# Patient Record
Sex: FEMALE | URBAN - METROPOLITAN AREA
[De-identification: names, ages, dates, MRNs, and addresses within clinical notes are randomized per-mention and may not be internally consistent; named-entity substitution may affect disease eponyms.]

---

## 2017-08-01 ENCOUNTER — EMERGENCY (EMERGENCY)
Facility: HOSPITAL | Age: 37
LOS: 1 days | Discharge: HOME | End: 2017-08-01
Attending: EMERGENCY MEDICINE | Admitting: EMERGENCY MEDICINE
Payer: MEDICAID

## 2017-08-01 VITALS
SYSTOLIC BLOOD PRESSURE: 100 MMHG | HEART RATE: 77 BPM | OXYGEN SATURATION: 100 % | DIASTOLIC BLOOD PRESSURE: 59 MMHG | TEMPERATURE: 98 F | RESPIRATION RATE: 18 BRPM

## 2017-08-01 VITALS
RESPIRATION RATE: 18 BRPM | SYSTOLIC BLOOD PRESSURE: 97 MMHG | DIASTOLIC BLOOD PRESSURE: 63 MMHG | HEART RATE: 61 BPM | OXYGEN SATURATION: 100 % | TEMPERATURE: 98 F

## 2017-08-01 PROCEDURE — 99284 EMERGENCY DEPT VISIT MOD MDM: CPT

## 2017-08-01 PROCEDURE — 73030 X-RAY EXAM OF SHOULDER: CPT | Mod: 26,RT

## 2017-08-01 NOTE — ED PROVIDER NOTE - PHYSICAL EXAMINATION
ATTENDING PHYSICAL EXAM DR. HUDSON ***GEN - NAD; well appearing; A+O x3 ***HEAD - NC/AT ***EYES/NOSE - PERRL, EOMI, mucous membranes moist, no discharge ***THROAT: Oral cavity and pharynx normal. No inflammation, swelling, exudate, or lesions.  ***NECK: Neck supple, non-tender without lymphadenopathy, no masses, no thyromegaly.   ***PULMONARY - CTA b/l, symmetric breath sounds. ***CARDIAC -s1s2, RRR, no M,G,R  ***ABDOMEN - +BS, ND, NT, soft, no guarding, no rebound, no masses   ***BACK - no CVA tenderness, Normal  spine ***EXTREMITIES - symmetric pulses, 2+ dp, capillary refill < 2 seconds, no clubbing, no cyanosis, no edema, +AC joint tenderness ***SKIN - no rash or bruising   ***NEUROLOGIC - alert, reflexes nl, sensation nl, coordination nl, finger to nose nl, motor 5/5 finger opposition/wrist ext/finger add

## 2017-08-01 NOTE — ED PROVIDER NOTE - OBJECTIVE STATEMENT
38 y/o F w/ no significant PMHx, presents to the ED c/o right shoulder pain x2 weeks. [place] and had a sudden onset of right shoulder pain, worse with movement. Pt notes pain radiates to the neck and to the left shoulder.  Pt has taken Advil, Motrin and Excedrin w/ no relief. Denies fever or any other complaints. NKDA.

## 2017-08-04 ENCOUNTER — EMERGENCY (EMERGENCY)
Facility: HOSPITAL | Age: 37
LOS: 1 days | Discharge: HOME | End: 2017-08-04
Attending: EMERGENCY MEDICINE | Admitting: EMERGENCY MEDICINE
Payer: MEDICAID

## 2017-08-04 VITALS
OXYGEN SATURATION: 100 % | RESPIRATION RATE: 16 BRPM | DIASTOLIC BLOOD PRESSURE: 82 MMHG | TEMPERATURE: 98 F | SYSTOLIC BLOOD PRESSURE: 121 MMHG | HEART RATE: 87 BPM

## 2017-08-04 VITALS
TEMPERATURE: 99 F | OXYGEN SATURATION: 100 % | RESPIRATION RATE: 16 BRPM | DIASTOLIC BLOOD PRESSURE: 78 MMHG | SYSTOLIC BLOOD PRESSURE: 117 MMHG | HEART RATE: 89 BPM

## 2017-08-04 LAB
ALBUMIN SERPL ELPH-MCNC: 4.4 G/DL — SIGNIFICANT CHANGE UP (ref 3.3–5)
ALP SERPL-CCNC: 63 U/L — SIGNIFICANT CHANGE UP (ref 40–120)
ALT FLD-CCNC: 9 U/L — SIGNIFICANT CHANGE UP (ref 4–33)
APPEARANCE UR: SIGNIFICANT CHANGE UP
AST SERPL-CCNC: 13 U/L — SIGNIFICANT CHANGE UP (ref 4–32)
BACTERIA # UR AUTO: SIGNIFICANT CHANGE UP
BASOPHILS # BLD AUTO: 0.03 K/UL — SIGNIFICANT CHANGE UP (ref 0–0.2)
BASOPHILS NFR BLD AUTO: 0.2 % — SIGNIFICANT CHANGE UP (ref 0–2)
BILIRUB SERPL-MCNC: 0.3 MG/DL — SIGNIFICANT CHANGE UP (ref 0.2–1.2)
BILIRUB UR-MCNC: NEGATIVE — SIGNIFICANT CHANGE UP
BLOOD UR QL VISUAL: NEGATIVE — SIGNIFICANT CHANGE UP
BUN SERPL-MCNC: 16 MG/DL — SIGNIFICANT CHANGE UP (ref 7–23)
CALCIUM SERPL-MCNC: 9.4 MG/DL — SIGNIFICANT CHANGE UP (ref 8.4–10.5)
CHLORIDE SERPL-SCNC: 103 MMOL/L — SIGNIFICANT CHANGE UP (ref 98–107)
CO2 SERPL-SCNC: 26 MMOL/L — SIGNIFICANT CHANGE UP (ref 22–31)
COLOR SPEC: SIGNIFICANT CHANGE UP
CREAT SERPL-MCNC: 0.68 MG/DL — SIGNIFICANT CHANGE UP (ref 0.5–1.3)
EOSINOPHIL # BLD AUTO: 0.06 K/UL — SIGNIFICANT CHANGE UP (ref 0–0.5)
EOSINOPHIL NFR BLD AUTO: 0.3 % — SIGNIFICANT CHANGE UP (ref 0–6)
GLUCOSE SERPL-MCNC: 115 MG/DL — HIGH (ref 70–99)
GLUCOSE UR-MCNC: NEGATIVE — SIGNIFICANT CHANGE UP
HCT VFR BLD CALC: 39.5 % — SIGNIFICANT CHANGE UP (ref 34.5–45)
HGB BLD-MCNC: 13.4 G/DL — SIGNIFICANT CHANGE UP (ref 11.5–15.5)
HYALINE CASTS # UR AUTO: SIGNIFICANT CHANGE UP (ref 0–?)
IMM GRANULOCYTES # BLD AUTO: 0.12 # — SIGNIFICANT CHANGE UP
IMM GRANULOCYTES NFR BLD AUTO: 0.6 % — SIGNIFICANT CHANGE UP (ref 0–1.5)
KETONES UR-MCNC: NEGATIVE — SIGNIFICANT CHANGE UP
LEUKOCYTE ESTERASE UR-ACNC: HIGH
LIDOCAIN IGE QN: 32.2 U/L — SIGNIFICANT CHANGE UP (ref 7–60)
LYMPHOCYTES # BLD AUTO: 12.1 % — LOW (ref 13–44)
LYMPHOCYTES # BLD AUTO: 2.29 K/UL — SIGNIFICANT CHANGE UP (ref 1–3.3)
MCHC RBC-ENTMCNC: 31.4 PG — SIGNIFICANT CHANGE UP (ref 27–34)
MCHC RBC-ENTMCNC: 33.9 % — SIGNIFICANT CHANGE UP (ref 32–36)
MCV RBC AUTO: 92.5 FL — SIGNIFICANT CHANGE UP (ref 80–100)
MONOCYTES # BLD AUTO: 0.54 K/UL — SIGNIFICANT CHANGE UP (ref 0–0.9)
MONOCYTES NFR BLD AUTO: 2.9 % — SIGNIFICANT CHANGE UP (ref 2–14)
MUCOUS THREADS # UR AUTO: SIGNIFICANT CHANGE UP
NEUTROPHILS # BLD AUTO: 15.88 K/UL — HIGH (ref 1.8–7.4)
NEUTROPHILS NFR BLD AUTO: 83.9 % — HIGH (ref 43–77)
NITRITE UR-MCNC: NEGATIVE — SIGNIFICANT CHANGE UP
NON-SQ EPI CELLS # UR AUTO: <1 — SIGNIFICANT CHANGE UP
NRBC # FLD: 0 — SIGNIFICANT CHANGE UP
PH UR: 6.5 — SIGNIFICANT CHANGE UP (ref 4.6–8)
PLATELET # BLD AUTO: 281 K/UL — SIGNIFICANT CHANGE UP (ref 150–400)
PMV BLD: 10.4 FL — SIGNIFICANT CHANGE UP (ref 7–13)
POTASSIUM SERPL-MCNC: 4.2 MMOL/L — SIGNIFICANT CHANGE UP (ref 3.5–5.3)
POTASSIUM SERPL-SCNC: 4.2 MMOL/L — SIGNIFICANT CHANGE UP (ref 3.5–5.3)
PROT SERPL-MCNC: 7.8 G/DL — SIGNIFICANT CHANGE UP (ref 6–8.3)
PROT UR-MCNC: 20 — SIGNIFICANT CHANGE UP
RBC # BLD: 4.27 M/UL — SIGNIFICANT CHANGE UP (ref 3.8–5.2)
RBC # FLD: 12.5 % — SIGNIFICANT CHANGE UP (ref 10.3–14.5)
RBC CASTS # UR COMP ASSIST: HIGH (ref 0–?)
SODIUM SERPL-SCNC: 140 MMOL/L — SIGNIFICANT CHANGE UP (ref 135–145)
SP GR SPEC: 1.02 — SIGNIFICANT CHANGE UP (ref 1–1.03)
SQUAMOUS # UR AUTO: SIGNIFICANT CHANGE UP
UROBILINOGEN FLD QL: NORMAL E.U. — SIGNIFICANT CHANGE UP (ref 0.1–0.2)
WBC # BLD: 18.92 K/UL — HIGH (ref 3.8–10.5)
WBC # FLD AUTO: 18.92 K/UL — HIGH (ref 3.8–10.5)
WBC UR QL: SIGNIFICANT CHANGE UP (ref 0–?)

## 2017-08-04 PROCEDURE — 99284 EMERGENCY DEPT VISIT MOD MDM: CPT | Mod: 25

## 2017-08-04 RX ORDER — ONDANSETRON 8 MG/1
4 TABLET, FILM COATED ORAL ONCE
Qty: 0 | Refills: 0 | Status: COMPLETED | OUTPATIENT
Start: 2017-08-04 | End: 2017-08-04

## 2017-08-04 RX ORDER — SODIUM CHLORIDE 9 MG/ML
1000 INJECTION INTRAMUSCULAR; INTRAVENOUS; SUBCUTANEOUS ONCE
Qty: 0 | Refills: 0 | Status: COMPLETED | OUTPATIENT
Start: 2017-08-04 | End: 2017-08-04

## 2017-08-04 RX ORDER — FAMOTIDINE 10 MG/ML
20 INJECTION INTRAVENOUS ONCE
Qty: 0 | Refills: 0 | Status: COMPLETED | OUTPATIENT
Start: 2017-08-04 | End: 2017-08-04

## 2017-08-04 RX ADMIN — Medication 30 MILLILITER(S): at 03:43

## 2017-08-04 RX ADMIN — FAMOTIDINE 20 MILLIGRAM(S): 10 INJECTION INTRAVENOUS at 03:43

## 2017-08-04 RX ADMIN — SODIUM CHLORIDE 1000 MILLILITER(S): 9 INJECTION INTRAMUSCULAR; INTRAVENOUS; SUBCUTANEOUS at 03:43

## 2017-08-04 RX ADMIN — ONDANSETRON 4 MILLIGRAM(S): 8 TABLET, FILM COATED ORAL at 03:43

## 2017-08-04 NOTE — ED PROVIDER NOTE - PHYSICAL EXAMINATION
Abd soft  No guarding, no rebound Abd soft  No guarding, no rebound  ATTENDING PHYSICAL EXAM DR. Freeman ***GEN - appears uncomfortable, recent vomiting; A+O x3 ***HEAD - NC/AT ***EYES/NOSE - PERRL, EOMI, mucous membranes moist, no discharge ***THROAT: Oral cavity and pharynx normal. No inflammation, swelling, exudate, or lesions.  ***NECK: Neck supple, non-tender without lymphadenopathy, no masses, no JVD.   ***PULMONARY - CTA b/l, symmetric breath sounds. ***CARDIAC -s1s2, RRR, no M,R,G  ***ABDOMEN - +BS, ND, NT, soft, no guarding, no rebound, no masses   ***BACK - no CVA tenderness, Normal  spine ***EXTREMITIES - symmetric pulses, 2+ dp, capillary refill < 2 seconds, no clubbing, no cyanosis, no edema ***SKIN - no rash or bruising

## 2017-08-04 NOTE — ED ADULT TRIAGE NOTE - CHIEF COMPLAINT QUOTE
states having lower abdominal pain/cramping for past few hours with n/v. denies diarrhea/constipation. started taking antibiotics today for "some stomach infection." states same happened last time she took these antibiotics. denies any change of pregnancy.

## 2017-08-04 NOTE — ED ADULT NURSE NOTE - OBJECTIVE STATEMENT
Ambulatory accompanied by friend complaining of severe abdominal pain, N/V since earlier this evening. Patient is clearly uncomfortable and unable to sit still in bed. Pain is diffuse, mostly RLQ/LLQ. Denies fever/chills, eating anything out of the ordinary. VSS. UA sent, POCT Preg neg, 20g IV inserted to LAC. Safety maintained, needs attended, will continue to monitor

## 2017-08-04 NOTE — ED PROVIDER NOTE - OBJECTIVE STATEMENT
37F hx of Hpylori presents with abd cramping with nausea and vomiting x a few hours. Pt was prescribed Lansoprazolwe, amox and clarithromycin (AM and PM) dose and Azithromycin (PM). Pt experienced abd pain and cramping after taking Azithromycin. Has 4 episodes of vomiting. LMP unknown, on IUD. 37F hx of Hpylori presents with abd cramping with nausea and vomiting x a few hours. Pt was prescribed Lansoprazolwe, amox and clarithromycin (AM and PM) dose and Azithromycin (PM). Pt experienced abd pain and cramping after taking Azithromycin. Has 4 episodes of vomiting. LMP unknown, on IUD.  Attending - Reviewed above.  I evaluated patient myself.  36 y/o F with boyfriend.  Reports diagnosed with H. Pylori approx 8 months ago and treated.  Advised to repeat treatment per PMD and started the medications yesterday (Thursday).  States after taking the azithromycin this evening developed diffuse abd cramps, nausea, and multiple episodes of vomiting.  No fever.  No chest pain or shortness of breathe.  No recent trauma or illness.

## 2017-08-05 LAB
BACTERIA UR CULT: SIGNIFICANT CHANGE UP
SPECIMEN SOURCE: SIGNIFICANT CHANGE UP

## 2017-08-17 ENCOUNTER — APPOINTMENT (OUTPATIENT)
Dept: OBGYN | Facility: HOSPITAL | Age: 37
End: 2017-08-17

## 2019-06-12 ENCOUNTER — APPOINTMENT (OUTPATIENT)
Dept: DERMATOLOGY | Facility: CLINIC | Age: 39
End: 2019-06-12
Payer: MEDICAID

## 2019-06-12 ENCOUNTER — LABORATORY RESULT (OUTPATIENT)
Age: 39
End: 2019-06-12

## 2019-06-12 VITALS — BODY MASS INDEX: 27.94 KG/M2 | WEIGHT: 162.8 LBS

## 2019-06-12 DIAGNOSIS — B07.8 OTHER VIRAL WARTS: ICD-10-CM

## 2019-06-12 DIAGNOSIS — D48.9 NEOPLASM OF UNCERTAIN BEHAVIOR, UNSPECIFIED: ICD-10-CM

## 2019-06-12 DIAGNOSIS — L21.9 SEBORRHEIC DERMATITIS, UNSPECIFIED: ICD-10-CM

## 2019-06-12 DIAGNOSIS — D18.01 HEMANGIOMA OF SKIN AND SUBCUTANEOUS TISSUE: ICD-10-CM

## 2019-06-12 PROCEDURE — 11102 TANGNTL BX SKIN SINGLE LES: CPT | Mod: 59

## 2019-06-12 PROCEDURE — 99203 OFFICE O/P NEW LOW 30 MIN: CPT | Mod: 25

## 2019-06-12 PROCEDURE — 17110 DESTRUCTION B9 LES UP TO 14: CPT | Mod: 59

## 2019-06-19 ENCOUNTER — TRANSCRIPTION ENCOUNTER (OUTPATIENT)
Age: 39
End: 2019-06-19

## 2019-07-05 ENCOUNTER — TRANSCRIPTION ENCOUNTER (OUTPATIENT)
Age: 39
End: 2019-07-05

## 2019-07-06 ENCOUNTER — RESULT REVIEW (OUTPATIENT)
Age: 39
End: 2019-07-06

## 2019-07-06 ENCOUNTER — INPATIENT (INPATIENT)
Facility: HOSPITAL | Age: 39
LOS: 0 days | Discharge: HOME | End: 2019-07-07
Attending: SURGERY | Admitting: SURGERY
Payer: MEDICAID

## 2019-07-06 VITALS
OXYGEN SATURATION: 98 % | RESPIRATION RATE: 16 BRPM | SYSTOLIC BLOOD PRESSURE: 123 MMHG | HEART RATE: 84 BPM | TEMPERATURE: 98 F | DIASTOLIC BLOOD PRESSURE: 82 MMHG

## 2019-07-06 DIAGNOSIS — K35.80 UNSPECIFIED ACUTE APPENDICITIS: ICD-10-CM

## 2019-07-06 DIAGNOSIS — Z98.890 OTHER SPECIFIED POSTPROCEDURAL STATES: Chronic | ICD-10-CM

## 2019-07-06 LAB
ALBUMIN SERPL ELPH-MCNC: 4.4 G/DL — SIGNIFICANT CHANGE UP (ref 3.3–5)
ALP SERPL-CCNC: 72 U/L — SIGNIFICANT CHANGE UP (ref 40–120)
ALT FLD-CCNC: 13 U/L — SIGNIFICANT CHANGE UP (ref 4–33)
ANION GAP SERPL CALC-SCNC: 14 MMO/L — SIGNIFICANT CHANGE UP (ref 7–14)
APPEARANCE UR: SIGNIFICANT CHANGE UP
APTT BLD: 26.6 SEC — LOW (ref 27.5–36.3)
AST SERPL-CCNC: 14 U/L — SIGNIFICANT CHANGE UP (ref 4–32)
BACTERIA # UR AUTO: SIGNIFICANT CHANGE UP
BASE EXCESS BLDV CALC-SCNC: 3.2 MMOL/L — SIGNIFICANT CHANGE UP
BASOPHILS # BLD AUTO: 0.04 K/UL — SIGNIFICANT CHANGE UP (ref 0–0.2)
BASOPHILS NFR BLD AUTO: 0.2 % — SIGNIFICANT CHANGE UP (ref 0–2)
BILIRUB SERPL-MCNC: 0.5 MG/DL — SIGNIFICANT CHANGE UP (ref 0.2–1.2)
BILIRUB UR-MCNC: NEGATIVE — SIGNIFICANT CHANGE UP
BLD GP AB SCN SERPL QL: NEGATIVE — SIGNIFICANT CHANGE UP
BLOOD GAS VENOUS - CREATININE: 0.75 MG/DL — SIGNIFICANT CHANGE UP (ref 0.5–1.3)
BLOOD GAS VENOUS - FIO2: 21 — SIGNIFICANT CHANGE UP
BLOOD UR QL VISUAL: NEGATIVE — SIGNIFICANT CHANGE UP
BUN SERPL-MCNC: 9 MG/DL — SIGNIFICANT CHANGE UP (ref 7–23)
CALCIUM SERPL-MCNC: 9.4 MG/DL — SIGNIFICANT CHANGE UP (ref 8.4–10.5)
CHLORIDE BLDV-SCNC: 106 MMOL/L — SIGNIFICANT CHANGE UP (ref 96–108)
CHLORIDE SERPL-SCNC: 102 MMOL/L — SIGNIFICANT CHANGE UP (ref 98–107)
CO2 SERPL-SCNC: 22 MMOL/L — SIGNIFICANT CHANGE UP (ref 22–31)
COLOR SPEC: YELLOW — SIGNIFICANT CHANGE UP
CREAT SERPL-MCNC: 0.69 MG/DL — SIGNIFICANT CHANGE UP (ref 0.5–1.3)
EOSINOPHIL # BLD AUTO: 0.15 K/UL — SIGNIFICANT CHANGE UP (ref 0–0.5)
EOSINOPHIL NFR BLD AUTO: 0.6 % — SIGNIFICANT CHANGE UP (ref 0–6)
EPI CELLS # UR: SIGNIFICANT CHANGE UP
GAS PNL BLDV: 137 MMOL/L — SIGNIFICANT CHANGE UP (ref 136–146)
GLUCOSE BLDV-MCNC: 124 MG/DL — HIGH (ref 70–99)
GLUCOSE SERPL-MCNC: 129 MG/DL — HIGH (ref 70–99)
GLUCOSE UR-MCNC: NEGATIVE — SIGNIFICANT CHANGE UP
HCO3 BLDV-SCNC: 25 MMOL/L — SIGNIFICANT CHANGE UP (ref 20–27)
HCT VFR BLD CALC: 39.1 % — SIGNIFICANT CHANGE UP (ref 34.5–45)
HCT VFR BLDV CALC: 41.2 % — SIGNIFICANT CHANGE UP (ref 34.5–45)
HGB BLD-MCNC: 13 G/DL — SIGNIFICANT CHANGE UP (ref 11.5–15.5)
HGB BLDV-MCNC: 13.4 G/DL — SIGNIFICANT CHANGE UP (ref 11.5–15.5)
IMM GRANULOCYTES NFR BLD AUTO: 0.5 % — SIGNIFICANT CHANGE UP (ref 0–1.5)
INR BLD: 1.08 — SIGNIFICANT CHANGE UP (ref 0.88–1.17)
KETONES UR-MCNC: SIGNIFICANT CHANGE UP
LACTATE BLDV-MCNC: 1.3 MMOL/L — SIGNIFICANT CHANGE UP (ref 0.5–2)
LEUKOCYTE ESTERASE UR-ACNC: SIGNIFICANT CHANGE UP
LIDOCAIN IGE QN: 18.2 U/L — SIGNIFICANT CHANGE UP (ref 7–60)
LYMPHOCYTES # BLD AUTO: 1.68 K/UL — SIGNIFICANT CHANGE UP (ref 1–3.3)
LYMPHOCYTES # BLD AUTO: 7.1 % — LOW (ref 13–44)
MCHC RBC-ENTMCNC: 30.3 PG — SIGNIFICANT CHANGE UP (ref 27–34)
MCHC RBC-ENTMCNC: 33.2 % — SIGNIFICANT CHANGE UP (ref 32–36)
MCV RBC AUTO: 91.1 FL — SIGNIFICANT CHANGE UP (ref 80–100)
MONOCYTES # BLD AUTO: 1.27 K/UL — HIGH (ref 0–0.9)
MONOCYTES NFR BLD AUTO: 5.3 % — SIGNIFICANT CHANGE UP (ref 2–14)
NEUTROPHILS # BLD AUTO: 20.53 K/UL — HIGH (ref 1.8–7.4)
NEUTROPHILS NFR BLD AUTO: 86.3 % — HIGH (ref 43–77)
NITRITE UR-MCNC: NEGATIVE — SIGNIFICANT CHANGE UP
NRBC # FLD: 0 K/UL — SIGNIFICANT CHANGE UP (ref 0–0)
PCO2 BLDV: 48 MMHG — SIGNIFICANT CHANGE UP (ref 41–51)
PH BLDV: 7.38 PH — SIGNIFICANT CHANGE UP (ref 7.32–7.43)
PH UR: 8 — SIGNIFICANT CHANGE UP (ref 5–8)
PLATELET # BLD AUTO: 319 K/UL — SIGNIFICANT CHANGE UP (ref 150–400)
PMV BLD: 10.1 FL — SIGNIFICANT CHANGE UP (ref 7–13)
PO2 BLDV: < 24 MMHG — LOW (ref 35–40)
POTASSIUM BLDV-SCNC: 3.9 MMOL/L — SIGNIFICANT CHANGE UP (ref 3.4–4.5)
POTASSIUM SERPL-MCNC: 4.2 MMOL/L — SIGNIFICANT CHANGE UP (ref 3.5–5.3)
POTASSIUM SERPL-SCNC: 4.2 MMOL/L — SIGNIFICANT CHANGE UP (ref 3.5–5.3)
PROT SERPL-MCNC: 7.7 G/DL — SIGNIFICANT CHANGE UP (ref 6–8.3)
PROT UR-MCNC: SIGNIFICANT CHANGE UP
PROTHROM AB SERPL-ACNC: 12.3 SEC — SIGNIFICANT CHANGE UP (ref 9.8–13.1)
RBC # BLD: 4.29 M/UL — SIGNIFICANT CHANGE UP (ref 3.8–5.2)
RBC # FLD: 12.5 % — SIGNIFICANT CHANGE UP (ref 10.3–14.5)
RBC CASTS # UR COMP ASSIST: SIGNIFICANT CHANGE UP (ref 0–?)
RH IG SCN BLD-IMP: POSITIVE — SIGNIFICANT CHANGE UP
SAO2 % BLDV: 31.7 % — LOW (ref 60–85)
SODIUM SERPL-SCNC: 138 MMOL/L — SIGNIFICANT CHANGE UP (ref 135–145)
SP GR SPEC: 1.02 — SIGNIFICANT CHANGE UP (ref 1–1.04)
UROBILINOGEN FLD QL: NORMAL — SIGNIFICANT CHANGE UP
WBC # BLD: 23.79 K/UL — HIGH (ref 3.8–10.5)
WBC # FLD AUTO: 23.79 K/UL — HIGH (ref 3.8–10.5)
WBC UR QL: >50 — HIGH (ref 0–?)

## 2019-07-06 PROCEDURE — 74177 CT ABD & PELVIS W/CONTRAST: CPT | Mod: 26

## 2019-07-06 PROCEDURE — 76830 TRANSVAGINAL US NON-OB: CPT | Mod: 26

## 2019-07-06 PROCEDURE — 88304 TISSUE EXAM BY PATHOLOGIST: CPT | Mod: 26

## 2019-07-06 RX ORDER — ACETAMINOPHEN 500 MG
650 TABLET ORAL EVERY 6 HOURS
Refills: 0 | Status: DISCONTINUED | OUTPATIENT
Start: 2019-07-07 | End: 2019-07-07

## 2019-07-06 RX ORDER — OXYCODONE HYDROCHLORIDE 5 MG/1
5 TABLET ORAL EVERY 4 HOURS
Refills: 0 | Status: DISCONTINUED | OUTPATIENT
Start: 2019-07-06 | End: 2019-07-06

## 2019-07-06 RX ORDER — HYDROMORPHONE HYDROCHLORIDE 2 MG/ML
1 INJECTION INTRAMUSCULAR; INTRAVENOUS; SUBCUTANEOUS ONCE
Refills: 0 | Status: DISCONTINUED | OUTPATIENT
Start: 2019-07-06 | End: 2019-07-06

## 2019-07-06 RX ORDER — ACETAMINOPHEN 500 MG
650 TABLET ORAL ONCE
Refills: 0 | Status: COMPLETED | OUTPATIENT
Start: 2019-07-06 | End: 2019-07-06

## 2019-07-06 RX ORDER — NITROFURANTOIN MACROCRYSTAL 50 MG
100 CAPSULE ORAL
Refills: 0 | Status: DISCONTINUED | OUTPATIENT
Start: 2019-07-06 | End: 2019-07-07

## 2019-07-06 RX ORDER — CEFOTETAN DISODIUM 1 G
2 VIAL (EA) INJECTION EVERY 12 HOURS
Refills: 0 | Status: DISCONTINUED | OUTPATIENT
Start: 2019-07-06 | End: 2019-07-06

## 2019-07-06 RX ORDER — SODIUM CHLORIDE 9 MG/ML
1000 INJECTION INTRAMUSCULAR; INTRAVENOUS; SUBCUTANEOUS ONCE
Refills: 0 | Status: COMPLETED | OUTPATIENT
Start: 2019-07-06 | End: 2019-07-06

## 2019-07-06 RX ORDER — IBUPROFEN 200 MG
400 TABLET ORAL EVERY 6 HOURS
Refills: 0 | Status: DISCONTINUED | OUTPATIENT
Start: 2019-07-07 | End: 2019-07-07

## 2019-07-06 RX ORDER — HYDROMORPHONE HYDROCHLORIDE 2 MG/ML
0.5 INJECTION INTRAMUSCULAR; INTRAVENOUS; SUBCUTANEOUS
Refills: 0 | Status: DISCONTINUED | OUTPATIENT
Start: 2019-07-06 | End: 2019-07-06

## 2019-07-06 RX ORDER — METRONIDAZOLE 500 MG
500 TABLET ORAL ONCE
Refills: 0 | Status: COMPLETED | OUTPATIENT
Start: 2019-07-06 | End: 2019-07-06

## 2019-07-06 RX ORDER — ONDANSETRON 8 MG/1
4 TABLET, FILM COATED ORAL ONCE
Refills: 0 | Status: DISCONTINUED | OUTPATIENT
Start: 2019-07-06 | End: 2019-07-06

## 2019-07-06 RX ORDER — MORPHINE SULFATE 50 MG/1
4 CAPSULE, EXTENDED RELEASE ORAL ONCE
Refills: 0 | Status: DISCONTINUED | OUTPATIENT
Start: 2019-07-06 | End: 2019-07-06

## 2019-07-06 RX ORDER — CEFTRIAXONE 500 MG/1
1000 INJECTION, POWDER, FOR SOLUTION INTRAMUSCULAR; INTRAVENOUS ONCE
Refills: 0 | Status: COMPLETED | OUTPATIENT
Start: 2019-07-06 | End: 2019-07-06

## 2019-07-06 RX ORDER — KETOROLAC TROMETHAMINE 30 MG/ML
15 SYRINGE (ML) INJECTION ONCE
Refills: 0 | Status: DISCONTINUED | OUTPATIENT
Start: 2019-07-06 | End: 2019-07-06

## 2019-07-06 RX ORDER — ACETAMINOPHEN 500 MG
975 TABLET ORAL EVERY 6 HOURS
Refills: 0 | Status: DISCONTINUED | OUTPATIENT
Start: 2019-07-06 | End: 2019-07-06

## 2019-07-06 RX ORDER — MORPHINE SULFATE 50 MG/1
2 CAPSULE, EXTENDED RELEASE ORAL EVERY 6 HOURS
Refills: 0 | Status: DISCONTINUED | OUTPATIENT
Start: 2019-07-06 | End: 2019-07-06

## 2019-07-06 RX ORDER — ACETAMINOPHEN 500 MG
1000 TABLET ORAL ONCE
Refills: 0 | Status: COMPLETED | OUTPATIENT
Start: 2019-07-06 | End: 2019-07-06

## 2019-07-06 RX ORDER — ENOXAPARIN SODIUM 100 MG/ML
40 INJECTION SUBCUTANEOUS EVERY 24 HOURS
Refills: 0 | Status: DISCONTINUED | OUTPATIENT
Start: 2019-07-06 | End: 2019-07-07

## 2019-07-06 RX ORDER — SODIUM CHLORIDE 9 MG/ML
1000 INJECTION, SOLUTION INTRAVENOUS
Refills: 0 | Status: DISCONTINUED | OUTPATIENT
Start: 2019-07-06 | End: 2019-07-06

## 2019-07-06 RX ORDER — OXYCODONE HYDROCHLORIDE 5 MG/1
10 TABLET ORAL EVERY 4 HOURS
Refills: 0 | Status: DISCONTINUED | OUTPATIENT
Start: 2019-07-06 | End: 2019-07-07

## 2019-07-06 RX ORDER — HYDROMORPHONE HYDROCHLORIDE 2 MG/ML
1 INJECTION INTRAMUSCULAR; INTRAVENOUS; SUBCUTANEOUS
Refills: 0 | Status: DISCONTINUED | OUTPATIENT
Start: 2019-07-06 | End: 2019-07-06

## 2019-07-06 RX ORDER — OXYCODONE HYDROCHLORIDE 5 MG/1
5 TABLET ORAL EVERY 4 HOURS
Refills: 0 | Status: DISCONTINUED | OUTPATIENT
Start: 2019-07-06 | End: 2019-07-07

## 2019-07-06 RX ADMIN — Medication 100 MILLIGRAM(S): at 08:42

## 2019-07-06 RX ADMIN — OXYCODONE HYDROCHLORIDE 5 MILLIGRAM(S): 5 TABLET ORAL at 11:33

## 2019-07-06 RX ADMIN — SODIUM CHLORIDE 1000 MILLILITER(S): 9 INJECTION INTRAMUSCULAR; INTRAVENOUS; SUBCUTANEOUS at 03:39

## 2019-07-06 RX ADMIN — HYDROMORPHONE HYDROCHLORIDE 0.5 MILLIGRAM(S): 2 INJECTION INTRAMUSCULAR; INTRAVENOUS; SUBCUTANEOUS at 21:30

## 2019-07-06 RX ADMIN — MORPHINE SULFATE 4 MILLIGRAM(S): 50 CAPSULE, EXTENDED RELEASE ORAL at 03:55

## 2019-07-06 RX ADMIN — MORPHINE SULFATE 4 MILLIGRAM(S): 50 CAPSULE, EXTENDED RELEASE ORAL at 05:03

## 2019-07-06 RX ADMIN — OXYCODONE HYDROCHLORIDE 5 MILLIGRAM(S): 5 TABLET ORAL at 10:07

## 2019-07-06 RX ADMIN — SODIUM CHLORIDE 100 MILLILITER(S): 9 INJECTION, SOLUTION INTRAVENOUS at 10:33

## 2019-07-06 RX ADMIN — SODIUM CHLORIDE 1000 MILLILITER(S): 9 INJECTION INTRAMUSCULAR; INTRAVENOUS; SUBCUTANEOUS at 04:44

## 2019-07-06 RX ADMIN — HYDROMORPHONE HYDROCHLORIDE 1 MILLIGRAM(S): 2 INJECTION INTRAMUSCULAR; INTRAVENOUS; SUBCUTANEOUS at 06:42

## 2019-07-06 RX ADMIN — CEFTRIAXONE 100 MILLIGRAM(S): 500 INJECTION, POWDER, FOR SOLUTION INTRAMUSCULAR; INTRAVENOUS at 06:02

## 2019-07-06 RX ADMIN — HYDROMORPHONE HYDROCHLORIDE 0.5 MILLIGRAM(S): 2 INJECTION INTRAMUSCULAR; INTRAVENOUS; SUBCUTANEOUS at 21:15

## 2019-07-06 RX ADMIN — Medication 650 MILLIGRAM(S): at 07:46

## 2019-07-06 RX ADMIN — MORPHINE SULFATE 4 MILLIGRAM(S): 50 CAPSULE, EXTENDED RELEASE ORAL at 03:41

## 2019-07-06 RX ADMIN — HYDROMORPHONE HYDROCHLORIDE 1 MILLIGRAM(S): 2 INJECTION INTRAMUSCULAR; INTRAVENOUS; SUBCUTANEOUS at 07:00

## 2019-07-06 RX ADMIN — Medication 650 MILLIGRAM(S): at 07:06

## 2019-07-06 RX ADMIN — MORPHINE SULFATE 2 MILLIGRAM(S): 50 CAPSULE, EXTENDED RELEASE ORAL at 15:06

## 2019-07-06 RX ADMIN — Medication 15 MILLIGRAM(S): at 03:55

## 2019-07-06 RX ADMIN — MORPHINE SULFATE 4 MILLIGRAM(S): 50 CAPSULE, EXTENDED RELEASE ORAL at 08:42

## 2019-07-06 RX ADMIN — MORPHINE SULFATE 4 MILLIGRAM(S): 50 CAPSULE, EXTENDED RELEASE ORAL at 11:33

## 2019-07-06 RX ADMIN — MORPHINE SULFATE 4 MILLIGRAM(S): 50 CAPSULE, EXTENDED RELEASE ORAL at 04:45

## 2019-07-06 RX ADMIN — Medication 400 MILLIGRAM(S): at 16:18

## 2019-07-06 RX ADMIN — SODIUM CHLORIDE 1000 MILLILITER(S): 9 INJECTION INTRAMUSCULAR; INTRAVENOUS; SUBCUTANEOUS at 16:31

## 2019-07-06 RX ADMIN — Medication 15 MILLIGRAM(S): at 03:40

## 2019-07-06 NOTE — ED PROVIDER NOTE - CLINICAL SUMMARY MEDICAL DECISION MAKING FREE TEXT BOX
appy vs uti vs pyelo vs ovarian torsion vs cholecysitis vs pancreatitis vs ectopic. labs fluids, urine preg, ct, transvagus abx reassess

## 2019-07-06 NOTE — H&P ADULT - NSHPPHYSICALEXAM_GEN_ALL_CORE
Vital Signs Last 24 Hrs  T(C): 37.1 (06 Jul 2019 07:47), Max: 37.9 (06 Jul 2019 06:43)  T(F): 98.7 (06 Jul 2019 07:47), Max: 100.3 (06 Jul 2019 06:43)  HR: 98 (06 Jul 2019 06:43) (84 - 98)  BP: 118/64 (06 Jul 2019 06:43) (114/71 - 123/82)  BP(mean): --  RR: 17 (06 Jul 2019 06:43) (16 - 18)  SpO2: 100% (06 Jul 2019 06:43) (98% - 100%)    GEN: NAD, resting quietly  PULM: symmetric chest rise bilaterally, no increased WOB  CV: regular rate, peripheral pulses intact  ABD: soft, non-distended, TTP RLQ, voluntary guarding, negative Rovsings sign  EXTR: no cyanosis or edema, moving all extremities

## 2019-07-06 NOTE — BRIEF OPERATIVE NOTE - NSICDXBRIEFPOSTOP_GEN_ALL_CORE_FT
POST-OP DIAGNOSIS:  Umbilical hernia 06-Jul-2019 21:21:48  Jamey Melendez  Acute appendicitis 06-Jul-2019 21:19:18  Jamey Melendez

## 2019-07-06 NOTE — BRIEF OPERATIVE NOTE - NSICDXBRIEFPREOP_GEN_ALL_CORE_FT
PRE-OP DIAGNOSIS:  Umbilical hernia 06-Jul-2019 21:21:39  Jamey Melendez  Acute appendicitis 06-Jul-2019 21:19:09  Jamey Melendez

## 2019-07-06 NOTE — H&P ADULT - HISTORY OF PRESENT ILLNESS
40 yo woman with no PMH presenting with acute onset abdominal pain x1 day. Pain was initially diffusely periumbilical, then migrated to the RLQ and suprapubic regions, with associated nausea, vomiting, fevers, and chills. She also reports urinary frequency during the same period but denies dysuria. Denies diarrhea or constipation. Last PO intake yesterday at 10 am.    ED Course: afebrile, stable vital signs. Received 2L NS and ceftriaxone/flagyl.

## 2019-07-06 NOTE — ED PROVIDER NOTE - ATTENDING CONTRIBUTION TO CARE
concern for appy vs gb disease vs pyelo vs uti vs torsion. will check labs pain control, abx, urine culture ct abdomen

## 2019-07-06 NOTE — ED ADULT NURSE NOTE - CHIEF COMPLAINT QUOTE
pt c/o epigastric pain radiating from the top of the abdomen down to her pelvis. pt endorses nausea and vomiting. symptoms began this morning. LMP- 6/14. triage EKG in progress. pt appears uncomfortable in triage, brought to ambulance bay to lay down on stretcher.

## 2019-07-06 NOTE — H&P ADULT - ASSESSMENT
40 yo otherwise healthy woman presenting with acute appendicitis and possible UTI.    - admit to A Team, Dr. Jose Castro  - added on for the OR and consented  - Pain control  - NPO, IVF  - IV abx: cefotetan (already received ceftriaxone and flagyl)  - dvt ppx: lovenox    Patient discussed with Dr. Matthew SCHMID Team Surgery  [id number] 40 yo otherwise healthy woman presenting with acute appendicitis and possible UTI.    - admit to A Team, Dr. Jose Castro  - added on for the OR and consented  - T&S, coags pending  - Pain control  - NPO, IVF  - IV abx: cefotetan (already received ceftriaxone and flagyl)  - dvt ppx: lovenox    Patient discussed with Dr. Matthew SCHMID Team Surgery  [id number] 40 yo otherwise healthy woman presenting with acute appendicitis and possible UTI.    - admit to A Team, Dr. Jose Castro  - added on for the OR and consented  - T&S, coags pending  - Pain control  - NPO, IVF  - IV abx: cefotetan (already received ceftriaxone and flagyl)  - dvt ppx: SCDs    Patient discussed with Dr. Matthew SCHMID Team Surgery  [id number]

## 2019-07-06 NOTE — BRIEF OPERATIVE NOTE - OPERATION/FINDINGS
Infra-umbilical curvilinear incision made. Small, fat-containing umbilical hernia identified. Sac dissected free and resected. Access gained to abdominal cavity utilizing Irby technique. Appendix found to be markedly inflamed and distended, and adherent to the terminal ileum. Blunt dissection was used to separate the appendix from nearby structures. The mesoappendix was divided with endo-Ligasure. The base of the appendix was divided with an endo-QUENTIN purple load. The staple line was hemostatic. A minimal amount of turbid fluid was suctioned from the pelvis. The umbilical hernia defect was closed primarily with interrupted Maxxon sutures.

## 2019-07-06 NOTE — ED PROVIDER NOTE - OBJECTIVE STATEMENT
39 year old female no pmh with 1 day n/v and right sided abd pain. + fever no sob no cp no headache no dysuria no vaginal discharge. symptoms worsening throughout day. pain is sharp and sever. constant. No

## 2019-07-06 NOTE — ED ADULT TRIAGE NOTE - CHIEF COMPLAINT QUOTE
pt c/o epigastric pain radiating from the top of the abdomen down to her pelvis. pt endorses nausea and vomiting. symptoms began this morning. LMP- 6/14. triage EKG in progress. pt c/o epigastric pain radiating from the top of the abdomen down to her pelvis. pt endorses nausea and vomiting. symptoms began this morning. LMP- 6/14. triage EKG in progress. pt appears uncomfortable in triage, brought to ambulance bay to lay down on stretcher.

## 2019-07-06 NOTE — H&P ADULT - NSHPLABSRESULTS_GEN_ALL_CORE
CBC (07-06 @ 03:19)                              13.0                           23.79<H>  )----------------(  319        86.3<H>% Neutrophils, 7.1<L>% Lymphocytes, ANC: 20.53<H>                              39.1                  BMP (07-06 @ 03:19)             138     |  102     |  9     		Ca++ --      Ca 9.4                ---------------------------------( 129<H>		Mg --                 4.2     |  22      |  0.69  			Ph --        LFTs (07-06 @ 03:19)      TPro 7.7 / Alb 4.4 / TBili 0.5 / DBili -- / AST 14 / ALT 13 / AlkPhos 72      ABG (07-06 @ 03:20)      /  /  /  /  / %     Lactate:   1.3    VBG (07-06 @ 03:20)     7.38 / 48 / < 24<L> / 25 / 3.2 / 31.7<L>%      IMAGING:  < from: CT Abdomen and Pelvis w/ IV Cont (07.06.19 @ 06:51) >    IMPRESSION:     Acute appendicitis. No bowelobstruction, drainable fluid collection or   intraperitoneal free air.    Thickened bladder wall with mucosal enhancement. Recommend clinical   correlation to assess urinary tract infection.    < end of copied text >

## 2019-07-06 NOTE — ED ADULT NURSE REASSESSMENT NOTE - NS ED NURSE REASSESS COMMENT FT1
receiving pt from night RN. Pt aox3, ambulatory. pt reports having urinary symptoms x 4 days. pt reports clitoris burns upon urination and pt experiencing frequent urge to urinate. pt reports increasing LRQ pain that started early this morning. pt appears uncomfortable. pt medicated as ordered. pt to be consulted by surgery team. pt denies headache, chest pain, sob. 20g iv access to right AC intact and flushing well.

## 2019-07-06 NOTE — PROVIDER CONTACT NOTE (OTHER) - ACTION/TREATMENT ORDERED:
Pt shakiness, VS-137/100, HR-110, Temp-102.2 F, RR-38, and SaO2-100%. Complaint of 10/10 pain in RLQ. Nathaly Gaytan MD made aware. Will continue to monitor.

## 2019-07-06 NOTE — H&P ADULT - ATTENDING COMMENTS
Patient seen and examined. She has had pain since earlier this morning. Denies any fever or chills. Never had pain like this before.    On exam: she is awake, alert and oriented  She is breathing comfortably on room air  There is no scleral icterus  She is grossly moving all extremities  Her abdomen is soft, tender in the right lower quadrant and suprapubic area. There is a reducible umbilical hernia.     Labs and imaging reviewed.    39 year old woman with acute appendicitis  1. Antibiotics  2. NPO and IV fluids  3. OR for appendectomy  - Risks, benefits, and alternatives discussed. All her questions, and family's questions answered.

## 2019-07-06 NOTE — PATIENT PROFILE ADULT - NSPROPASSIVESMOKEEXPOSURE_GEN_A_NUR
Reason For Visit    MARILYN PARRA presents for an INR check.          Referred By   PCP Dr Betancourt    Dx: Cerebral Infarct, unspecified (prior to 2015- as per initial PCP note)  Factor V deficiency.      History of Present Illness    Symptoms:.   The patient is currently asymptomatic.   Additional Screening:.   No. Missed dose(s).    No: Extra dose(s).   No: Significant medication changes since last visit .   No: Vitamin K dietary changes.    No: S/Sx(s) of bleeding or bruising.    No: ETOH Intake.    No: Falls/Injuries.    No: Acute Illness.    No: Procedure/Hospital/ER Visit.      Current Meds   1. Allegra-D Allergy & Congestion 180-240 MG Oral Tablet Extended Release 24 Hour; TAKE   1 TABLET DAILY;   Therapy: 21Myu0651 to (Evaluate:14Mar2016); Last Rx:[id number] Ordered   2. Amoxicillin-Pot Clavulanate 875-125 MG Oral Tablet; TAKE ONE TABLET BY MOUTH   EVERY 12 HOURS UNTIL ALL TAKEN;   Therapy: 18Hnc3724 to (Evaluate:57Qpe5621)  Requested for: 51Srf4219; Last   Rx:[id number] Ordered   3. Esomeprazole Magnesium 40 MG Oral Capsule Delayed Release; TAKE ONE CAPSULE   BY MOUTH DAILY AS NEEDED;   Therapy: 01Jun2015 to (Evaluate:47Mpf9839)  Requested for: 19Nov2016; Last   Rx:[id number] Ordered   4. Fluticasone Propionate 50 MCG/ACT Nasal Suspension; USE 1 SPRAY IN EACH   NOSTRIL TWICE DAILY;   Therapy: 09Mpj7044 to (Last Rx:[id number])  Requested for: 01Mar2016 Ordered   5. FreeStyle Lite Test In Vitro Strip; TEST 3 TIMES DAILY;   Therapy: 17Oct2015 to (Evaluate:01Mar2016)  Requested for: 17Oct2015; Last   Rx:[id number] Ordered   6. Lidocaine 5 % External Ointment;   Therapy: 65Wct9561 to Recorded   7. Lisinopril-Hydrochlorothiazide 20-12.5 MG Oral Tablet; TAKE 1 TABLET BY MOUTH DAILY;   Therapy: 25Oct2016 to (Evaluate:23Jan2018)  Requested for: 28Jan2017; Last   Rx:[id number] Ordered   8. Lyrica 200 MG Oral Capsule; TAKE ONE CAPSULE BY MOUTH THREE TIMES DAILY;   Therapy: 80Jmw6955 to (Evaluate:61Mdx8788)  Requested for:  73Iqu4871; Last   Rx:[id number] Ordered   9. Meclizine HCl - 25 MG Oral Tablet; TAKE 1 TABLET 3 TIMES DAILY;   Therapy: 05Nov2015 to (Evaluate:04Jan2016)  Requested for: 05Nov2015; Last   Rx:[id number] Ordered   10. Meloxicam 15 MG Oral Tablet; TAKE 1 TABLET BY MOUTH DAILY WITH FOOD;    Therapy: 03May2016 to (Evaluate:38Rle3357)  Requested for: 88Ffk1733; Last    Rx:[id number] Ordered   11. Metoprolol Succinate ER 50 MG Oral Tablet Extended Release 24 Hour; TAKE 1 TABLET    DAILY;    Therapy: (Recorded:96Ufs5420) to Recorded   12. ProAir  (90 Base) MCG/ACT Inhalation Aerosol Solution; INHALE 2 PUFFS BY    MOUTH EVERY 6 HOURS AS NEEDED;    Therapy: 01Feb2015 to (Evaluate:56Lud1502)  Requested for: 05Apr2016; Last    Rx:[id number] Ordered   13. Restasis 0.05 % Ophthalmic Emulsion;    Therapy: (Recorded:21Oct2016) to Recorded   14. Simvastatin 10 MG Oral Tablet; TAKE 1 TABLET BY MOUTH EVERY DAY;    Therapy: 05Apr2016 to (Evaluate:29Mar2017)  Requested for: 48Dyq8809; Last    Rx:[id number] Ordered   15. Symbicort 160-4.5 MCG/ACT Inhalation Aerosol; INHALE 2 PUFFS BY MOUTH TWICE    DAILY. RINSE MOUTH AFTER USE;    Therapy: 02Feb2015 to (Evaluate:82Koi7415)  Requested for: 28Jan2017; Last    Rx:[id number] Ordered   16. Tamsulosin HCl - 0.4 MG Oral Capsule; TAKE ONE CAPSULE BY MOUTH DAILY;    Therapy: 14Jan2016 to (Evaluate:29Mar2017)  Requested for: 15Zfr4557; Last    Rx:[id number] Ordered   17. Travatan Z 0.004 % Ophthalmic Solution;    Therapy: (Recorded:53Qwy4692) to Recorded   18. Warfarin Sodium 4 MG Oral Tablet; TAKE 1 TABLET BY MOUTH DAILY AS DIRECTED;    Therapy: 05Jan2017 to (Evaluate:43Bkr3095)  Requested for: 41Hdp8899; Last    Rx:[id number] Ordered    Results/Data  Coumadin New    [id number] 31Jan2017 17Jan2017 03Jan2017 [id number]   PROTHROMBIN TIME, FINGERSTICK    14.6  31.6    INR, FINGERSTICK 3.9  3.5  3.3  1.2  2.6    Date Current dose INR Plan  2/14/17 6 mg T,Th,Sat; 8 mg ROW (50 mg/wk) 3.9 hold x 1 (T), 6  mg x 1 (W) then DECR 8 mg MWF, 6 mg ROW, etiology: few alcoholic drinks during the weekend  1/31/17 6 mg T, 8 mg ROW (54 mg/wk) 3.5 hold x 1 (T) then DECR 6 mg T,Th,Sat; 8 mg ROW (50 mg/wk), etiology: dose too high??  1/17/17 8 mg daily (56 mg/wk) as per pt's report 3.3 DECR: 6 mg T, 8 mg ROW (54 mg/wk)  1/03/17 8 mg TF, 6 mg ROW (46 mg/wk) 1.2 12 mg today then CPM 8 mg TF, 6 mg ROW (46 mg/wk), etiology: missed dose/doses.         03 Jan 2017 12:20 PM    prc PROTHROMBIN TIME, IN-OFFICE FINGERSTICK        INR, FINGERSTICK 1.2        PROTHROMBIN TIME, FINGERSTICK 14.6         Assessment   1. Cerebral infarction, unspecified (434.91) (I63.9)   2. 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   Goal INR Range: 2.0-3.0.   Estimated Duration: LONG-TERM. Pt reported that on the Jan 5-6 he got phone call from Dr Betancourt's office with instructions to increase Coumadin to 8 mg daily and since then he takes 8 mg daily as ordered.      Orders   · prc PROTHROMBIN TIME, IN-OFFICE FINGERSTICK; Status:Complete;   Done:  88Wjf5735 10:00AM   Performed:In Office; Due:16Mar2017; Last Updated By:Shonda Elizabeth; 2/14/2017 12:48:18 PM;Ordered; For:Cerebral infarction, unspecified, Factor V deficiency; Ordered By:RASTA BETANCOURT;      Additional Dosing Instructions: hold x 1 (T), 6 mg x 1 (W) then DECR 8 mg MWF, 6 mg ROW.   Follow-up: 2 weeks 2/28/2017.    Provided patient with verbal and written dosing instructions. Pt verbalized understanding.      Counseling    Strongly advised to limit or avoid alcohol consumption; also discussed risks of INR elevation/bleeding with excessive alcohol consumption:   Discussed the risk of thrombotic and bleeding complications with inappropriate use of anticoagulant:   Patient agrees to all of the above       Signatures   Electronically signed by : Shonda Elizabeth, ; Feb 14 2017 12:52PM CST     No

## 2019-07-07 ENCOUNTER — TRANSCRIPTION ENCOUNTER (OUTPATIENT)
Age: 39
End: 2019-07-07

## 2019-07-07 VITALS
OXYGEN SATURATION: 99 % | TEMPERATURE: 99 F | DIASTOLIC BLOOD PRESSURE: 73 MMHG | SYSTOLIC BLOOD PRESSURE: 116 MMHG | RESPIRATION RATE: 17 BRPM | HEART RATE: 90 BPM

## 2019-07-07 LAB
ANION GAP SERPL CALC-SCNC: 12 MMO/L — SIGNIFICANT CHANGE UP (ref 7–14)
BACTERIA UR CULT: SIGNIFICANT CHANGE UP
BASOPHILS # BLD AUTO: 0.01 K/UL — SIGNIFICANT CHANGE UP (ref 0–0.2)
BASOPHILS NFR BLD AUTO: 0 % — SIGNIFICANT CHANGE UP (ref 0–2)
BASOPHILS NFR SPEC: 0 % — SIGNIFICANT CHANGE UP (ref 0–2)
BLASTS # FLD: 0 % — SIGNIFICANT CHANGE UP (ref 0–0)
BUN SERPL-MCNC: 6 MG/DL — LOW (ref 7–23)
CALCIUM SERPL-MCNC: 8.4 MG/DL — SIGNIFICANT CHANGE UP (ref 8.4–10.5)
CHLORIDE SERPL-SCNC: 103 MMOL/L — SIGNIFICANT CHANGE UP (ref 98–107)
CO2 SERPL-SCNC: 23 MMOL/L — SIGNIFICANT CHANGE UP (ref 22–31)
CREAT SERPL-MCNC: 0.56 MG/DL — SIGNIFICANT CHANGE UP (ref 0.5–1.3)
EOSINOPHIL # BLD AUTO: 0.07 K/UL — SIGNIFICANT CHANGE UP (ref 0–0.5)
EOSINOPHIL NFR BLD AUTO: 0.3 % — SIGNIFICANT CHANGE UP (ref 0–6)
EOSINOPHIL NFR FLD: 0 % — SIGNIFICANT CHANGE UP (ref 0–6)
GIANT PLATELETS BLD QL SMEAR: PRESENT — SIGNIFICANT CHANGE UP
GLUCOSE SERPL-MCNC: 140 MG/DL — HIGH (ref 70–99)
HCT VFR BLD CALC: 32.2 % — LOW (ref 34.5–45)
HGB BLD-MCNC: 10.7 G/DL — LOW (ref 11.5–15.5)
IMM GRANULOCYTES NFR BLD AUTO: 0.7 % — SIGNIFICANT CHANGE UP (ref 0–1.5)
LYMPHOCYTES # BLD AUTO: 1.22 K/UL — SIGNIFICANT CHANGE UP (ref 1–3.3)
LYMPHOCYTES # BLD AUTO: 5.9 % — LOW (ref 13–44)
LYMPHOCYTES NFR SPEC AUTO: 5.2 % — LOW (ref 13–44)
MAGNESIUM SERPL-MCNC: 1.9 MG/DL — SIGNIFICANT CHANGE UP (ref 1.6–2.6)
MCHC RBC-ENTMCNC: 30.7 PG — SIGNIFICANT CHANGE UP (ref 27–34)
MCHC RBC-ENTMCNC: 33.2 % — SIGNIFICANT CHANGE UP (ref 32–36)
MCV RBC AUTO: 92.3 FL — SIGNIFICANT CHANGE UP (ref 80–100)
METAMYELOCYTES # FLD: 0 % — SIGNIFICANT CHANGE UP (ref 0–1)
MONOCYTES # BLD AUTO: 0.31 K/UL — SIGNIFICANT CHANGE UP (ref 0–0.9)
MONOCYTES NFR BLD AUTO: 1.5 % — LOW (ref 2–14)
MONOCYTES NFR BLD: 1.8 % — LOW (ref 2–9)
MYELOCYTES NFR BLD: 0 % — SIGNIFICANT CHANGE UP (ref 0–0)
NEUTROPHIL AB SER-ACNC: 91.3 % — HIGH (ref 43–77)
NEUTROPHILS # BLD AUTO: 19.03 K/UL — HIGH (ref 1.8–7.4)
NEUTROPHILS NFR BLD AUTO: 91.6 % — HIGH (ref 43–77)
NEUTS BAND # BLD: 0 % — SIGNIFICANT CHANGE UP (ref 0–6)
NRBC # FLD: 0 K/UL — SIGNIFICANT CHANGE UP (ref 0–0)
OTHER - HEMATOLOGY %: 0 — SIGNIFICANT CHANGE UP
PHOSPHATE SERPL-MCNC: 2.1 MG/DL — LOW (ref 2.5–4.5)
PLATELET # BLD AUTO: 258 K/UL — SIGNIFICANT CHANGE UP (ref 150–400)
PLATELET COUNT - ESTIMATE: NORMAL — SIGNIFICANT CHANGE UP
PMV BLD: 10.6 FL — SIGNIFICANT CHANGE UP (ref 7–13)
POTASSIUM SERPL-MCNC: 3.6 MMOL/L — SIGNIFICANT CHANGE UP (ref 3.5–5.3)
POTASSIUM SERPL-SCNC: 3.6 MMOL/L — SIGNIFICANT CHANGE UP (ref 3.5–5.3)
PROMYELOCYTES # FLD: 0 % — SIGNIFICANT CHANGE UP (ref 0–0)
RBC # BLD: 3.49 M/UL — LOW (ref 3.8–5.2)
RBC # FLD: 12.8 % — SIGNIFICANT CHANGE UP (ref 10.3–14.5)
SODIUM SERPL-SCNC: 138 MMOL/L — SIGNIFICANT CHANGE UP (ref 135–145)
SPECIMEN SOURCE: SIGNIFICANT CHANGE UP
VARIANT LYMPHS # BLD: 1.7 % — SIGNIFICANT CHANGE UP
WBC # BLD: 20.78 K/UL — HIGH (ref 3.8–10.5)
WBC # FLD AUTO: 20.78 K/UL — HIGH (ref 3.8–10.5)

## 2019-07-07 RX ORDER — NITROFURANTOIN MACROCRYSTAL 50 MG
1 CAPSULE ORAL
Qty: 6 | Refills: 0
Start: 2019-07-07 | End: 2019-07-09

## 2019-07-07 RX ORDER — OXYCODONE HYDROCHLORIDE 5 MG/1
1 TABLET ORAL
Qty: 4 | Refills: 0
Start: 2019-07-07 | End: 2019-07-08

## 2019-07-07 RX ORDER — OXYCODONE HYDROCHLORIDE 5 MG/1
1 TABLET ORAL
Qty: 10 | Refills: 0
Start: 2019-07-07

## 2019-07-07 RX ORDER — ACETAMINOPHEN 500 MG
2 TABLET ORAL
Qty: 0 | Refills: 0 | DISCHARGE
Start: 2019-07-07

## 2019-07-07 RX ADMIN — Medication 100 MILLIGRAM(S): at 16:00

## 2019-07-07 RX ADMIN — Medication 400 MILLIGRAM(S): at 10:10

## 2019-07-07 RX ADMIN — OXYCODONE HYDROCHLORIDE 10 MILLIGRAM(S): 5 TABLET ORAL at 00:18

## 2019-07-07 RX ADMIN — Medication 650 MILLIGRAM(S): at 06:37

## 2019-07-07 RX ADMIN — Medication 650 MILLIGRAM(S): at 12:38

## 2019-07-07 RX ADMIN — OXYCODONE HYDROCHLORIDE 5 MILLIGRAM(S): 5 TABLET ORAL at 06:37

## 2019-07-07 RX ADMIN — Medication 100 MILLIGRAM(S): at 07:28

## 2019-07-07 RX ADMIN — Medication 650 MILLIGRAM(S): at 13:08

## 2019-07-07 RX ADMIN — Medication 400 MILLIGRAM(S): at 09:39

## 2019-07-07 RX ADMIN — ENOXAPARIN SODIUM 40 MILLIGRAM(S): 100 INJECTION SUBCUTANEOUS at 06:38

## 2019-07-07 RX ADMIN — Medication 400 MILLIGRAM(S): at 15:56

## 2019-07-07 RX ADMIN — OXYCODONE HYDROCHLORIDE 10 MILLIGRAM(S): 5 TABLET ORAL at 00:48

## 2019-07-07 RX ADMIN — Medication 400 MILLIGRAM(S): at 16:30

## 2019-07-07 NOTE — PROGRESS NOTE ADULT - SUBJECTIVE AND OBJECTIVE BOX
Morning Surgical Progress Note  Patient is a 39y old  Female who presents with a chief complaint of Acute appendicitis (2019 10:24)      SUBJECTIVE: Patient is a 40 yo F POD 1 S/p lapraroscopic appendectomy. Patient seen and examined at bedside with surgical team; patient complains of pain in the epigastric region when she drinks water. She also complains of mild dizziness. Patient did not eat or drink early this morning and was encouraged to have breakfast. Blood pressure was 97/59, which is slightly lower than blood pressures performed earlier today    Vital Signs Last 24 Hrs  T(C): 36.9 (2019 09:58), Max: 39 (2019 16:15)  T(F): 98.5 (2019 09:58), Max: 102.2 (2019 16:15)  HR: 75 (2019 09:58) (67 - 120)  BP: 94/58 (2019 09:58) (94/58 - 137/100)  BP(mean): 77 (2019 22:00) (63 - 77)  RR: 17 (2019 09:58) (13 - 22)  SpO2: 99% (2019 09:58) (90% - 100%)I&O's Detail    2019 07:01  -  2019 07:00  --------------------------------------------------------  IN:    Oral Fluid: 200 mL  Total IN: 200 mL    OUT:    Voided: 800 mL  Total OUT: 800 mL    Total NET: -600 mL      MEDICATIONS  (STANDING):  acetaminophen   Tablet .. 650 milliGRAM(s) Oral every 6 hours  enoxaparin Injectable 40 milliGRAM(s) SubCutaneous every 24 hours  ibuprofen  Tablet. 400 milliGRAM(s) Oral every 6 hours  nitrofurantoin monohydrate/macrocrystals (MACROBID) 100 milliGRAM(s) Oral two times a day with meals    MEDICATIONS  (PRN):  oxyCODONE    IR 5 milliGRAM(s) Oral every 4 hours PRN Moderate Pain (4 - 6)  oxyCODONE    IR 10 milliGRAM(s) Oral every 4 hours PRN Severe Pain (7 - 10)      Physical Exam  Constitutional: A&Ox3, NAD  Respiratory:  Gastrointestinal:     LABS:                        10.7   20.78 )-----------( 258      ( 2019 07:30 )             32.2         138  |  103  |  6<L>  ----------------------------<  140<H>  3.6   |  23  |  0.56    Ca    8.4      2019 07:30  Phos  2.1     -  Mg     1.9         TPro  7.7  /  Alb  4.4  /  TBili  0.5  /  DBili  x   /  AST  14  /  ALT  13  /  AlkPhos  72  07-    PT/INR - ( 2019 09:25 )   PT: 12.3 SEC;   INR: 1.08          PTT - ( 2019 09:25 )  PTT:26.6 SEC  LIVER FUNCTIONS - ( 2019 03:19 )  Alb: 4.4 g/dL / Pro: 7.7 g/dL / ALK PHOS: 72 u/L / ALT: 13 u/L / AST: 14 u/L / GGT: x           Urinalysis Basic - ( 2019 03:20 )    Color: YELLOW / Appearance: HAZY / S.022 / pH: 8.0  Gluc: NEGATIVE / Ketone: SMALL  / Bili: NEGATIVE / Urobili: NORMAL   Blood: NEGATIVE / Protein: SMALL / Nitrite: NEGATIVE   Leuk Esterase: MODERATE / RBC: 0-2 / WBC >50   Sq Epi: x / Non Sq Epi: MODERATE / Bacteria: FEW Morning Surgical Progress Note  Patient is a 39y old  Female who presents with a chief complaint of Acute appendicitis (2019 10:24)      SUBJECTIVE: Patient is a 40 yo F POD 1 S/p lapraroscopic appendectomy. Patient seen and examined at bedside with surgical team; patient complains of pain in the epigastric region when she drinks water. She also complains of mild dizziness. Patient did not eat or drink early this morning and was encouraged to eat breakfast and drink fluids. Blood pressure was 97/59, which is slightly lower than blood pressures performed earlier today.    Vital Signs Last 24 Hrs  T(C): 36.9 (2019 09:58), Max: 39 (2019 16:15)  T(F): 98.5 (2019 09:58), Max: 102.2 (2019 16:15)  HR: 75 (2019 09:58) (67 - 120)  BP: 94/58 (2019 09:58) (94/58 - 137/100)  BP(mean): 77 (2019 22:00) (63 - 77)  RR: 17 (2019 09:58) (13 - 22)  SpO2: 99% (2019 09:58) (90% - 100%)I&O's Detail    2019 07:01  -  2019 07:00  --------------------------------------------------------  IN:    Oral Fluid: 200 mL  Total IN: 200 mL    OUT:    Voided: 800 mL  Total OUT: 800 mL    Total NET: -600 mL      MEDICATIONS  (STANDING):  acetaminophen   Tablet .. 650 milliGRAM(s) Oral every 6 hours  enoxaparin Injectable 40 milliGRAM(s) SubCutaneous every 24 hours  ibuprofen  Tablet. 400 milliGRAM(s) Oral every 6 hours  nitrofurantoin monohydrate/macrocrystals (MACROBID) 100 milliGRAM(s) Oral two times a day with meals    MEDICATIONS  (PRN):  oxyCODONE    IR 5 milliGRAM(s) Oral every 4 hours PRN Moderate Pain (4 - 6)  oxyCODONE    IR 10 milliGRAM(s) Oral every 4 hours PRN Severe Pain (7 - 10)      Physical Exam  Constitutional: A&Ox3, NAD  Respiratory:  Gastrointestinal:     LABS:                        10.7   20.78 )-----------( 258      ( 2019 07:30 )             32.2     -    138  |  103  |  6<L>  ----------------------------<  140<H>  3.6   |  23  |  0.56    Ca    8.4      2019 07:30  Phos  2.1     -  Mg     1.9         TPro  7.7  /  Alb  4.4  /  TBili  0.5  /  DBili  x   /  AST  14  /  ALT  13  /  AlkPhos  72  07-    PT/INR - ( 2019 09:25 )   PT: 12.3 SEC;   INR: 1.08          PTT - ( 2019 09:25 )  PTT:26.6 SEC  LIVER FUNCTIONS - ( 2019 03:19 )  Alb: 4.4 g/dL / Pro: 7.7 g/dL / ALK PHOS: 72 u/L / ALT: 13 u/L / AST: 14 u/L / GGT: x           Urinalysis Basic - ( 2019 03:20 )    Color: YELLOW / Appearance: HAZY / S.022 / pH: 8.0  Gluc: NEGATIVE / Ketone: SMALL  / Bili: NEGATIVE / Urobili: NORMAL   Blood: NEGATIVE / Protein: SMALL / Nitrite: NEGATIVE   Leuk Esterase: MODERATE / RBC: 0-2 / WBC >50   Sq Epi: x / Non Sq Epi: MODERATE / Bacteria: FEW Morning Surgical Progress Note  Patient is a 39y old  Female who presents with a chief complaint of Acute appendicitis (2019 10:24)      SUBJECTIVE: Patient is a 38 yo F POD 1 S/p lapraroscopic appendectomy. Patient seen and examined at bedside with surgical team; patient complains of pain in the epigastric region when she drinks water. She admits to having increased urinary frequency 3 days prior to admission, but currently denies pain or frequency with urination. She also complains of mild dizziness; blood pressure was 97/59mmHg, which is slightly lower than blood pressures performed earlier today. Patient did not eat or drink early this morning and was encouraged to eat breakfast and drink fluids.     Vital Signs Last 24 Hrs  T(C): 36.9 (2019 09:58), Max: 39 (2019 16:15)  T(F): 98.5 (2019 09:58), Max: 102.2 (2019 16:15)  HR: 75 (2019 09:58) (67 - 120)  BP: 94/58 (2019 09:58) (94/58 - 137/100)  BP(mean): 77 (2019 22:00) (63 - 77)  RR: 17 (2019 09:58) (13 - 22)  SpO2: 99% (2019 09:58) (90% - 100%)I&O's Detail    2019 07:01  -  2019 07:00  --------------------------------------------------------  IN:    Oral Fluid: 200 mL  Total IN: 200 mL    OUT:    Voided: 800 mL  Total OUT: 800 mL    Total NET: -600 mL      MEDICATIONS  (STANDING):  acetaminophen   Tablet .. 650 milliGRAM(s) Oral every 6 hours  enoxaparin Injectable 40 milliGRAM(s) SubCutaneous every 24 hours  ibuprofen  Tablet. 400 milliGRAM(s) Oral every 6 hours  nitrofurantoin monohydrate/macrocrystals (MACROBID) 100 milliGRAM(s) Oral two times a day with meals    MEDICATIONS  (PRN):  oxyCODONE    IR 5 milliGRAM(s) Oral every 4 hours PRN Moderate Pain (4 - 6)  oxyCODONE    IR 10 milliGRAM(s) Oral every 4 hours PRN Severe Pain (7 - 10)      Physical Exam  Constitutional: A&Ox3, NAD  Respiratory: CTA bilaterally, no cough, wheeze, rales or ronchi  Gastrointestinal: Abdomen soft, mildly distended, mildly tender to palpation. Dressings are clear, dry and intact.    LABS:                        10.7   20.78 )-----------( 258      ( 2019 07:30 )             32.2         138  |  103  |  6<L>  ----------------------------<  140<H>  3.6   |  23  |  0.56    Ca    8.4      2019 07:30  Phos  2.1       Mg     1.9         TPro  7.7  /  Alb  4.4  /  TBili  0.5  /  DBili  x   /  AST  14  /  ALT  13  /  AlkPhos  72  07-06    PT/INR - ( 2019 09:25 )   PT: 12.3 SEC;   INR: 1.08          PTT - ( 2019 09:25 )  PTT:26.6 SEC  LIVER FUNCTIONS - ( 2019 03:19 )  Alb: 4.4 g/dL / Pro: 7.7 g/dL / ALK PHOS: 72 u/L / ALT: 13 u/L / AST: 14 u/L / GGT: x           Urinalysis Basic - ( 2019 03:20 )    Color: YELLOW / Appearance: HAZY / S.022 / pH: 8.0  Gluc: NEGATIVE / Ketone: SMALL  / Bili: NEGATIVE / Urobili: NORMAL   Blood: NEGATIVE / Protein: SMALL / Nitrite: NEGATIVE   Leuk Esterase: MODERATE / RBC: 0-2 / WBC >50   Sq Epi: x / Non Sq Epi: MODERATE / Bacteria: FEW

## 2019-07-07 NOTE — DISCHARGE NOTE PROVIDER - HOSPITAL COURSE
Patient is a 38 yo F who presented with abdominal pain, nausea and vomiting. Patient was found to have appendicitis and thickening of the bladder wall on CT abdomen and pelvis. She had a laparoscopic appendectomy on . UA was positive for UTI and was prescribed Macrobid. POD1, patient complained of mild dizziness and her blood pressure dropped to 97/59mmHg. Patient's previous blood pressures were 107/69. Patient has been eating a drinking and blood pressure improved. Patient is medically cleared for discharge.        Vital Signs Last 24 Hrs    T(C): 37.1 (2019 13:29), Max: 39 (2019 16:15)    T(F): 98.8 (:29), Max: 102.2 (2019 16:15)    HR: 82 (:) (67 - 120)    BP: 105/58 (:29) (94/58 - 137/100)    BP(mean): 77 (2019 22:00) (63 - 77)    RR: 16 (:29) (13 - 22)    SpO2: 100% (2019 13:29) (90% - 100%)                                10.7     20.78 )-----------( 258      ( 2019 07:30 )               32.2             07-        138  |  103  |  6<L>    ----------------------------<  140<H>    3.6   |  23  |  0.56        Ca    8.4      2019 07:30    Phos  2.1     07-07    Mg     1.9     07-        TPro  7.7  /  Alb  4.4  /  TBili  0.5  /  DBili  x   /  AST  14  /  ALT  13  /  AlkPhos  72  07-06            PT/INR - ( 2019 09:25 )   PT: 12.3 SEC;   INR: 1.08                PTT - ( 2019 09:25 )  PTT:26.6 SEC                            Urinalysis Basic - ( 2019 03:20 )        Color: YELLOW / Appearance: HAZY / S.022 / pH: 8.0    Gluc: NEGATIVE / Ketone: SMALL  / Bili: NEGATIVE / Urobili: NORMAL     Blood: NEGATIVE / Protein: SMALL / Nitrite: NEGATIVE     Leuk Esterase: MODERATE / RBC: 0-2 / WBC >50     Sq Epi: x / Non Sq Epi: MODERATE / Bacteria: FEW            Plan:    	Please follow up with Dr. Castro in 5-10 days    	Alternate Tylenol 650mg with Ibuprofen every 3 hours for pain control    	Oxycodone 5mg PRN    	Macrobid for 3 days post discharge    	Drink water as tolerated Patient is a 40 yo F who presented with abdominal pain, nausea and vomiting. Patient was found to have appendicitis and thickening of the bladder wall on CT abdomen and pelvis. She had a laparoscopic appendectomy on . UA was positive for UTI and was prescribed Macrobid. POD1, patient complained of mild dizziness and her blood pressure dropped to 97/59mmHg. Patient's previous blood pressures were 107/69. Patient has been eating a drinking and blood pressure improved. Patient is medically cleared for discharge.        Vital Signs Last 24 Hrs    T(C): 37.1 (2019 13:29), Max: 39 (2019 16:15)    T(F): 98.8 (:29), Max: 102.2 (2019 16:15)    HR: 82 (:) (67 - 120)    BP: 105/58 (:29) (94/58 - 137/100)    BP(mean): 77 (2019 22:00) (63 - 77)    RR: 16 (:29) (13 - 22)    SpO2: 100% (2019 13:29) (90% - 100%)                                10.7     20.78 )-----------( 258      ( 2019 07:30 )               32.2             07-        138  |  103  |  6<L>    ----------------------------<  140<H>    3.6   |  23  |  0.56        Ca    8.4      2019 07:30    Phos  2.1     07-07    Mg     1.9     07-        TPro  7.7  /  Alb  4.4  /  TBili  0.5  /  DBili  x   /  AST  14  /  ALT  13  /  AlkPhos  72  07-06            PT/INR - ( 2019 09:25 )   PT: 12.3 SEC;   INR: 1.08                PTT - ( 2019 09:25 )  PTT:26.6 SEC                            Urinalysis Basic - ( 2019 03:20 )        Color: YELLOW / Appearance: HAZY / S.022 / pH: 8.0    Gluc: NEGATIVE / Ketone: SMALL  / Bili: NEGATIVE / Urobili: NORMAL     Blood: NEGATIVE / Protein: SMALL / Nitrite: NEGATIVE     Leuk Esterase: MODERATE / RBC: 0-2 / WBC >50     Sq Epi: x / Non Sq Epi: MODERATE / Bacteria: FEW            Plan:    	Please follow up with Dr. Castro in 5-10 days    	Alternate Tylenol 650mg with Ibuprofen 600mg every 3 hours for pain control    	Oxycodone 5mg PRN    	Macrobid for 3 days post discharge    	Drink water as tolerated

## 2019-07-07 NOTE — PROGRESS NOTE ADULT - ASSESSMENT
Patient is a 38 yo F POD 1 S/p lapraroscopic appendectomy.    Plan:  	Discharge after patient has had lunch  	Continue Macrobid for 3 more days  	Round the clock alternating Tylenol and Motrin PO q3h  	Oxycodone 5mg PRN (10 tablets will Patient is a 40 yo F POD 1 S/p lapraroscopic appendectomy.    Plan:  	Discharge after patient has had lunch  	Continue Macrobid for 3 more days  	Follow up with Dr. Castro in 5-10 days  	Round the clock alternating Tylenol and Motrin PO q3h  	Oxycodone 5mg PRN (10 tablets will be prescribed)

## 2019-07-07 NOTE — DISCHARGE NOTE NURSING/CASE MANAGEMENT/SOCIAL WORK - NSDCPEEMAIL_GEN_ALL_CORE
[facility] for Tobacco Control email [email or web address]

## 2019-07-07 NOTE — DISCHARGE NOTE NURSING/CASE MANAGEMENT/SOCIAL WORK - NSDCPEWEB_GEN_ALL_CORE
[place] website --- [email or web address] Tobacco [email or web address] website --- [email or web address]

## 2019-07-07 NOTE — PROGRESS NOTE ADULT - SUBJECTIVE AND OBJECTIVE BOX
Ms. Phipps is doing well.  She denies any nausea, vomiting, fever or chills overnight.  She has some abdominal pain, but it is controlled with PO pain medication.  She had some PO intake,  but limited.     ICU Vital Signs Last 24 Hrs  T(C): 36.9 (07 Jul 2019 09:58), Max: 39 (06 Jul 2019 16:15)  T(F): 98.5 (07 Jul 2019 09:58), Max: 102.2 (06 Jul 2019 16:15)  HR: 75 (07 Jul 2019 09:58) (67 - 120)  BP: 94/58 (07 Jul 2019 09:58) (94/58 - 137/100)  BP(mean): 77 (06 Jul 2019 22:00) (63 - 77)  ABP: --  ABP(mean): --  RR: 17 (07 Jul 2019 09:58) (13 - 22)  SpO2: 99% (07 Jul 2019 09:58) (90% - 100%)    Gen: comfortable, awake, alert  Abd: soft, not distended, appropriately tender at incisions. Dressings are in place.     39 year old woman s/p laparoscopic appendectomy and umbilical hernia repair without mesh, POD #1  1. Diet as tolerated  2. Out of bed and ambulate as tolerated  3. Antibiotics for UTI  4. Plan for discharge to home today with PO antibiotics for UTI.

## 2019-07-07 NOTE — DISCHARGE NOTE PROVIDER - CARE PROVIDER_API CALL
Jose Castro)  Surgery  [address], [address]  [place] [address]  Phone: [phone]  Fax: [phone]  Follow Up Time:

## 2019-07-07 NOTE — DISCHARGE NOTE PROVIDER - NSDCCPCAREPLAN_GEN_ALL_CORE_FT
PRINCIPAL DISCHARGE DIAGNOSIS  Diagnosis: Acute appendicitis, unspecified acute appendicitis type  Assessment and Plan of Treatment:       SECONDARY DISCHARGE DIAGNOSES  Diagnosis: Urinary tract infection  Assessment and Plan of Treatment:

## 2019-07-07 NOTE — DISCHARGE NOTE NURSING/CASE MANAGEMENT/SOCIAL WORK - NSDCPNDISPN_GEN_ALL_CORE
Safe use, storage and disposal of opioids when prescribed/Education provided on the pain management plan of care/Opioids not applicable/not prescribed/Side effects of pain management treatment/Activities of daily living, including home environment that might     exacerbate pain or reduce effectiveness of the pain management plan of care as well as strategies to address these issues

## 2019-07-07 NOTE — DISCHARGE NOTE NURSING/CASE MANAGEMENT/SOCIAL WORK - NSDCDPATPORTLINK_GEN_ALL_CORE
You can access the [facility] Patient Portal, offered by [facility], by registering with the following website: [email or web address]

## 2019-07-07 NOTE — CHART NOTE - NSCHARTNOTEFT_GEN_A_CORE
POST-OPERATIVE NOTE    Subjective: Patient endorses some pain in the RLQ, but is confident it will abait with PO oxy just given. She denies pain elsewhere in the abdomen, nausea, vomiting, shortness of breath, or chest pain. She has ambulated once t the bathroom and was able to urinate. No return of bowel function yet.  Patient is s/p . Recovering appropriately.     Vital Signs Last 24 Hrs  T(C): 37 (06 Jul 2019 23:22), Max: 39 (06 Jul 2019 16:15)  T(F): 98.6 (06 Jul 2019 23:22), Max: 102.2 (06 Jul 2019 16:15)  HR: 94 (06 Jul 2019 23:22) (84 - 120)  BP: 107/69 (06 Jul 2019 23:22) (96/56 - 137/100)  BP(mean): 77 (06 Jul 2019 22:00) (63 - 77)  RR: 17 (06 Jul 2019 23:22) (13 - 22)  SpO2: 98% (06 Jul 2019 23:22) (90% - 100%)  I&O's Detail    06 Jul 2019 07:01  -  07 Jul 2019 00:42  --------------------------------------------------------  IN:  Total IN: 0 mL    OUT:    Voided: 400 mL  Total OUT: 400 mL    Total NET: -400 mL        nitrofurantoin monohydrate/macrocrystals (MACROBID) 100  enoxaparin Injectable 40  nitrofurantoin monohydrate/macrocrystals (MACROBID) 100    PAST MEDICAL & SURGICAL HISTORY:  No pertinent past medical history  H/O excision of ganglion cyst: Right wrist        Physical Exam:  General: NAD, resting comfortably in bed  Pulmonary: Nonlabored breathing, no respiratory distress, lungs CTA bilaterally  Cardiovascular: NSR, normal s1/s2  Abdominal: soft, mildly tender RLQ, otherwise nontender. Incisions CDI      LABS:                        13.0   23.79 )-----------( 319      ( 06 Jul 2019 03:19 )             39.1     07-06    138  |  102  |  9   ----------------------------<  129<H>  4.2   |  22  |  0.69    Ca    9.4      06 Jul 2019 03:19    TPro  7.7  /  Alb  4.4  /  TBili  0.5  /  DBili  x   /  AST  14  /  ALT  13  /  AlkPhos  72  07-06    PT/INR - ( 06 Jul 2019 09:25 )   PT: 12.3 SEC;   INR: 1.08          PTT - ( 06 Jul 2019 09:25 )  PTT:26.6 SEC  CAPILLARY BLOOD GLUCOSE        Assessment:  Ms Phipps is POD 0 s/p open umbilical hernia repair and laparoscopic appendectomy. She is now on the floor recovering appropriately.     Plan:  - Pain control as needed, oxy 5/10, tylenol, motrin  - DVT ppx  - OOB and ambulating as tolerated  - f/u RBF  - F/u AM labs

## 2019-07-07 NOTE — DISCHARGE NOTE NURSING/CASE MANAGEMENT/SOCIAL WORK - NSDCPNINST_GEN_ALL_CORE
Please seek immediate medical attention if noted with any severe bleeding, or extreme pain. Keep incision sites clean and dry.

## 2019-07-08 RX ORDER — NITROFURANTOIN MACROCRYSTAL 50 MG
1 CAPSULE ORAL
Qty: 6 | Refills: 0
Start: 2019-07-08 | End: 2019-07-10

## 2019-07-08 RX ORDER — OXYCODONE HYDROCHLORIDE 5 MG/1
1 TABLET ORAL
Qty: 10 | Refills: 0
Start: 2019-07-08

## 2019-08-30 NOTE — ED PROVIDER NOTE - CROS ED ROS STATEMENT
Denies etoh, tobacco, or drug use. Lives in [facility]. Ambulates with rollator all other ROS negative except as per HPI
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
## 2019-11-04 ENCOUNTER — TRANSCRIPTION ENCOUNTER (OUTPATIENT)
Age: 39
End: 2019-11-04

## 2019-11-05 ENCOUNTER — INPATIENT (INPATIENT)
Facility: HOSPITAL | Age: 39
LOS: 0 days | Discharge: HOME | End: 2019-11-06
Attending: OBSTETRICS & GYNECOLOGY | Admitting: OBSTETRICS & GYNECOLOGY
Payer: MEDICAID

## 2019-11-05 ENCOUNTER — RESULT REVIEW (OUTPATIENT)
Age: 39
End: 2019-11-05

## 2019-11-05 VITALS
HEART RATE: 95 BPM | DIASTOLIC BLOOD PRESSURE: 68 MMHG | SYSTOLIC BLOOD PRESSURE: 104 MMHG | RESPIRATION RATE: 18 BRPM | OXYGEN SATURATION: 98 % | TEMPERATURE: 98 F

## 2019-11-05 DIAGNOSIS — O03.4 INCOMPLETE SPONTANEOUS ABORTION WITHOUT COMPLICATION: ICD-10-CM

## 2019-11-05 DIAGNOSIS — Z98.890 OTHER SPECIFIED POSTPROCEDURAL STATES: Chronic | ICD-10-CM

## 2019-11-05 LAB
ALBUMIN SERPL ELPH-MCNC: 4.1 G/DL — SIGNIFICANT CHANGE UP (ref 3.3–5)
ALP SERPL-CCNC: 79 U/L — SIGNIFICANT CHANGE UP (ref 40–120)
ALT FLD-CCNC: 9 U/L — SIGNIFICANT CHANGE UP (ref 4–33)
ANION GAP SERPL CALC-SCNC: 13 MMO/L — SIGNIFICANT CHANGE UP (ref 7–14)
APPEARANCE UR: SIGNIFICANT CHANGE UP
APTT BLD: 29.7 SEC — SIGNIFICANT CHANGE UP (ref 27.5–36.3)
AST SERPL-CCNC: 10 U/L — SIGNIFICANT CHANGE UP (ref 4–32)
BACTERIA # UR AUTO: SIGNIFICANT CHANGE UP
BASOPHILS # BLD AUTO: 0.01 K/UL — SIGNIFICANT CHANGE UP (ref 0–0.2)
BASOPHILS NFR BLD AUTO: 0.1 % — SIGNIFICANT CHANGE UP (ref 0–2)
BILIRUB SERPL-MCNC: 0.3 MG/DL — SIGNIFICANT CHANGE UP (ref 0.2–1.2)
BILIRUB UR-MCNC: NEGATIVE — SIGNIFICANT CHANGE UP
BLD GP AB SCN SERPL QL: NEGATIVE — SIGNIFICANT CHANGE UP
BLOOD UR QL VISUAL: SIGNIFICANT CHANGE UP
BUN SERPL-MCNC: 9 MG/DL — SIGNIFICANT CHANGE UP (ref 7–23)
CALCIUM SERPL-MCNC: 9.3 MG/DL — SIGNIFICANT CHANGE UP (ref 8.4–10.5)
CHLORIDE SERPL-SCNC: 102 MMOL/L — SIGNIFICANT CHANGE UP (ref 98–107)
CO2 SERPL-SCNC: 23 MMOL/L — SIGNIFICANT CHANGE UP (ref 22–31)
COLOR SPEC: SIGNIFICANT CHANGE UP
CREAT SERPL-MCNC: 0.69 MG/DL — SIGNIFICANT CHANGE UP (ref 0.5–1.3)
EOSINOPHIL # BLD AUTO: 0.12 K/UL — SIGNIFICANT CHANGE UP (ref 0–0.5)
EOSINOPHIL NFR BLD AUTO: 1 % — SIGNIFICANT CHANGE UP (ref 0–6)
GLUCOSE SERPL-MCNC: 91 MG/DL — SIGNIFICANT CHANGE UP (ref 70–99)
GLUCOSE UR-MCNC: NEGATIVE — SIGNIFICANT CHANGE UP
HCG SERPL-ACNC: 233.9 MIU/ML — SIGNIFICANT CHANGE UP
HCT VFR BLD CALC: 25.6 % — LOW (ref 34.5–45)
HGB BLD-MCNC: 7.9 G/DL — LOW (ref 11.5–15.5)
IMM GRANULOCYTES NFR BLD AUTO: 0.4 % — SIGNIFICANT CHANGE UP (ref 0–1.5)
INR BLD: 1.04 — SIGNIFICANT CHANGE UP (ref 0.88–1.17)
KETONES UR-MCNC: 20 — SIGNIFICANT CHANGE UP
LEUKOCYTE ESTERASE UR-ACNC: SIGNIFICANT CHANGE UP
LYMPHOCYTES # BLD AUTO: 2.53 K/UL — SIGNIFICANT CHANGE UP (ref 1–3.3)
LYMPHOCYTES # BLD AUTO: 21.9 % — SIGNIFICANT CHANGE UP (ref 13–44)
MCHC RBC-ENTMCNC: 28.1 PG — SIGNIFICANT CHANGE UP (ref 27–34)
MCHC RBC-ENTMCNC: 30.9 % — LOW (ref 32–36)
MCV RBC AUTO: 91.1 FL — SIGNIFICANT CHANGE UP (ref 80–100)
MONOCYTES # BLD AUTO: 0.5 K/UL — SIGNIFICANT CHANGE UP (ref 0–0.9)
MONOCYTES NFR BLD AUTO: 4.3 % — SIGNIFICANT CHANGE UP (ref 2–14)
NEUTROPHILS # BLD AUTO: 8.36 K/UL — HIGH (ref 1.8–7.4)
NEUTROPHILS NFR BLD AUTO: 72.3 % — SIGNIFICANT CHANGE UP (ref 43–77)
NITRITE UR-MCNC: NEGATIVE — SIGNIFICANT CHANGE UP
NRBC # FLD: 0 K/UL — SIGNIFICANT CHANGE UP (ref 0–0)
PH UR: 6 — SIGNIFICANT CHANGE UP (ref 5–8)
PLATELET # BLD AUTO: 452 K/UL — HIGH (ref 150–400)
PMV BLD: 9.9 FL — SIGNIFICANT CHANGE UP (ref 7–13)
POTASSIUM SERPL-MCNC: 3.7 MMOL/L — SIGNIFICANT CHANGE UP (ref 3.5–5.3)
POTASSIUM SERPL-SCNC: 3.7 MMOL/L — SIGNIFICANT CHANGE UP (ref 3.5–5.3)
PROT SERPL-MCNC: 7.9 G/DL — SIGNIFICANT CHANGE UP (ref 6–8.3)
PROT UR-MCNC: 200 — HIGH
PROTHROM AB SERPL-ACNC: 11.9 SEC — SIGNIFICANT CHANGE UP (ref 9.8–13.1)
RBC # BLD: 2.81 M/UL — LOW (ref 3.8–5.2)
RBC # FLD: 13.6 % — SIGNIFICANT CHANGE UP (ref 10.3–14.5)
RBC CASTS # UR COMP ASSIST: >50 — HIGH (ref 0–?)
RH IG SCN BLD-IMP: POSITIVE — SIGNIFICANT CHANGE UP
SODIUM SERPL-SCNC: 138 MMOL/L — SIGNIFICANT CHANGE UP (ref 135–145)
SP GR SPEC: 1.02 — SIGNIFICANT CHANGE UP (ref 1–1.04)
UROBILINOGEN FLD QL: NORMAL — SIGNIFICANT CHANGE UP
WBC # BLD: 11.57 K/UL — HIGH (ref 3.8–10.5)
WBC # FLD AUTO: 11.57 K/UL — HIGH (ref 3.8–10.5)
WBC UR QL: SIGNIFICANT CHANGE UP (ref 0–?)

## 2019-11-05 PROCEDURE — 76830 TRANSVAGINAL US NON-OB: CPT | Mod: 26

## 2019-11-05 PROCEDURE — 58120 DILATION AND CURETTAGE: CPT | Mod: GC

## 2019-11-05 PROCEDURE — 88305 TISSUE EXAM BY PATHOLOGIST: CPT | Mod: 26

## 2019-11-05 RX ORDER — METOCLOPRAMIDE HCL 10 MG
10 TABLET ORAL ONCE
Refills: 0 | Status: COMPLETED | OUTPATIENT
Start: 2019-11-05 | End: 2019-11-05

## 2019-11-05 RX ORDER — FENTANYL CITRATE 50 UG/ML
25 INJECTION INTRAVENOUS
Refills: 0 | Status: DISCONTINUED | OUTPATIENT
Start: 2019-11-05 | End: 2019-11-06

## 2019-11-05 RX ORDER — SODIUM CHLORIDE 9 MG/ML
1000 INJECTION, SOLUTION INTRAVENOUS
Refills: 0 | Status: DISCONTINUED | OUTPATIENT
Start: 2019-11-05 | End: 2019-11-06

## 2019-11-05 RX ORDER — ACETAMINOPHEN 500 MG
650 TABLET ORAL ONCE
Refills: 0 | Status: COMPLETED | OUTPATIENT
Start: 2019-11-05 | End: 2019-11-05

## 2019-11-05 RX ORDER — ONDANSETRON 8 MG/1
4 TABLET, FILM COATED ORAL ONCE
Refills: 0 | Status: COMPLETED | OUTPATIENT
Start: 2019-11-05 | End: 2019-11-05

## 2019-11-05 RX ORDER — SODIUM CHLORIDE 9 MG/ML
1000 INJECTION, SOLUTION INTRAVENOUS ONCE
Refills: 0 | Status: COMPLETED | OUTPATIENT
Start: 2019-11-05 | End: 2019-11-05

## 2019-11-05 RX ORDER — SODIUM CHLORIDE 9 MG/ML
1000 INJECTION INTRAMUSCULAR; INTRAVENOUS; SUBCUTANEOUS ONCE
Refills: 0 | Status: DISCONTINUED | OUTPATIENT
Start: 2019-11-05 | End: 2019-11-05

## 2019-11-05 RX ORDER — OXYCODONE HYDROCHLORIDE 5 MG/1
5 TABLET ORAL ONCE
Refills: 0 | Status: DISCONTINUED | OUTPATIENT
Start: 2019-11-05 | End: 2019-11-06

## 2019-11-05 RX ADMIN — Medication 650 MILLIGRAM(S): at 17:08

## 2019-11-05 RX ADMIN — SODIUM CHLORIDE 1000 MILLILITER(S): 9 INJECTION, SOLUTION INTRAVENOUS at 19:43

## 2019-11-05 RX ADMIN — Medication 10 MILLIGRAM(S): at 19:45

## 2019-11-05 RX ADMIN — SODIUM CHLORIDE 125 MILLILITER(S): 9 INJECTION, SOLUTION INTRAVENOUS at 21:00

## 2019-11-05 RX ADMIN — ONDANSETRON 4 MILLIGRAM(S): 8 TABLET, FILM COATED ORAL at 21:57

## 2019-11-05 NOTE — CONSULT NOTE ADULT - ASSESSMENT
40yo  LMP  presents with heavy vaginal bleeding after several doses of cytotec for missed AB. Patient is not actively bleeding on exam and vitals are stable. However, she has significant symptomatic anemia and has failed medical management. Patient may require surgical intervention, but is stable to undergo appropriate imaging.    - f/u TVUS, full recs pending results  - NPO and IVF  - Blood Type B+, active    Dolly Eduardo PGY2

## 2019-11-05 NOTE — BRIEF OPERATIVE NOTE - OPERATION/FINDINGS
8 week size anteverted uterus. Cervix 2cm dilated. 10 curved suction curette used to remove POC which were sent to pathology. Good hemostasis noted and count correctx2.

## 2019-11-05 NOTE — ASU DISCHARGE PLAN (ADULT/PEDIATRIC) - ASU DC SPECIAL INSTRUCTIONSFT
Regular diet. Resume normal activity as tolerated. No heavy lifting, driving, or strenuous activity for 2 weeks. Call your doctor with any signs and symptoms of infection such as fever, chills, nausea or vomiting. Call your doctor with redness or swelling at the incision site, fluid leakage or wound separation. Call your doctor if you're unable to tolerate food or have difficulty urinating. Call your doctor if you have pain that is not relieved by your prescribed medications. Notify your doctor with any other concerns. Follow up with  in 2 weeks.

## 2019-11-05 NOTE — H&P ADULT - NSHPLABSRESULTS_GEN_ALL_CORE
LABS:                                      7.9    11.57 )-----------( 452      ( 05 Nov 2019 15:16 )             25.6     11-05    138  |  102  |  9   ----------------------------<  91  3.7   |  23  |  0.69    Ca    9.3      05 Nov 2019 15:16    TPro  7.9  /  Alb  4.1  /  TBili  0.3  /  DBili  x   /  AST  10  /  ALT  9   /  AlkPhos  79  11-05    I&O's Detail    PT/INR - ( 05 Nov 2019 15:16 )   PT: 11.9 SEC;   INR: 1.04          PTT - ( 05 Nov 2019 15:16 )  PTT:29.7 SEC      RADIOLOGY & ADDITIONAL STUDIES:     < from: US Transvaginal (11.05.19 @ 17:29) >      EXAM:  US TRANSVAGINAL        PROCEDURE DATE:  Nov 5 2019         INTERPRETATION:  CLINICAL INFORMATION: Serum beta-hCG 234. Patient is   pregnant and September 2019, status post miscarriage in early October.   Pelvic pain and vaginal bleeding since miscarriage.    LMP: 10/11/2019    COMPARISON: Pelvic ultrasound from 7/6/2019    TECHNIQUE:     Endovaginal and transabdominal pelvic sonogram. Color and Spectral   Doppler was performed.     FINDINGS:    Uterus: 9.4 x 5.2 x 5.5 cm. Within normal limits.    Endometrium: Hyperechoic heterogeneous thickened endometrium measuring 25   mm with internal vascularity.     Right ovary: 3.4 x 1.6 x 2.4 cm. Within normal limits. Blood flow noted.    Left ovary: 3.2 x 1.5 x 2.5 cm. Within normal limits. Normal arterial   waveform.    Fluid: None.    IMPRESSION:    Findings likely represents retained product of conception.    No sonographic evidence of ovarian torsion.    Dr. Syed discussed the findings with Dr. Mack at 5:42 PM on 11/5/2019   with read back.             < end of copied text >          CHRISTINE SYED M.D., RADIOLOGY RESIDENT  This document has been electronically signed.  EAGLE WIGGINS M.D., ATTENDING RADIOLOGIST  This document has been electronically signed. Nov 5 2019  5:58PM

## 2019-11-05 NOTE — ED ADULT NURSE NOTE - OBJECTIVE STATEMENT
Patient presents for heavy vaginal bleeding after receiving cytotec for missed  10/25. Currently in NAD, patient admitted to surgery for removal of products of conception. Belongings removed and brought to security personal belongings slip to be tubed to station 53 as per OR BRITNI Albright.

## 2019-11-05 NOTE — ED PROVIDER NOTE - ATTENDING CONTRIBUTION TO CARE
40 yo F wth vaginal bleeding, recent spont ab. patient had US today and was told by ob would need d and c and presents to ED. also reports symptoms concerning for symptomatic anemia. plan: labs, tvus, ivf, reassess.

## 2019-11-05 NOTE — ASU DISCHARGE PLAN (ADULT/PEDIATRIC) - CARE PROVIDER_API CALL
Fiona,   Phone: (   )    -  Fax: (   )    -  Follow Up Time:     ACU Clinic,   3rd floor   Oncology Building  [facility]  Phone: [phone]  Fax: (   )    -  Follow Up Time:

## 2019-11-05 NOTE — H&P ADULT - ATTENDING COMMENTS
40yo  LMP  presents with retained POC's and heavy vaginal bleeding after several doses of cytotec for missed AB. For DVC.

## 2019-11-05 NOTE — H&P ADULT - PROBLEM SELECTOR PLAN 1
- admit to gyn  - patient consented at bedside for dilation vacuum curettage  - NPO + IVF  - Blood Type B+, not a rhogam candidate    d/w Dr. Todd Eduardo PGY2

## 2019-11-05 NOTE — BRIEF OPERATIVE NOTE - NSICDXBRIEFPOSTOP_GEN_ALL_CORE_FT
POST-OP DIAGNOSIS:  Retained products of conception after miscarriage 05-Nov-2019 20:47:47 Retained products of conception after miscarriage Marzena Russo

## 2019-11-05 NOTE — ED PROVIDER NOTE - CLINICAL SUMMARY MEDICAL DECISION MAKING FREE TEXT BOX
Pt is a 38 y/o F  PMHx appendectomy, recently diagnosed with anemia (on iron pills) p/w vaginal bleeding x 1 month. -- possible retained POC -- labs, T&S, transvaginal sono, ob/gyn consult

## 2019-11-05 NOTE — ASU DISCHARGE PLAN (ADULT/PEDIATRIC) - PROVIDER TOKENS
FREE:[LAST:[Fiona],PHONE:[(   )    -],FAX:[(   )    -]],FREE:[LAST:[[facility]],PHONE:[phone]:[(   )    -],ADDRESS:[[address] [place] [address]   Oncology [facility]]]

## 2019-11-05 NOTE — BRIEF OPERATIVE NOTE - NSICDXBRIEFPROCEDURE_GEN_ALL_CORE_FT
PROCEDURES:  Dilation and curettage, therapeutic, for incomplete, missed, septic, or induced  2019 20:48:36  Marzena Russo

## 2019-11-05 NOTE — H&P ADULT - ASSESSMENT
40yo  LMP  presents with heavy vaginal bleeding after several doses of cytotec for missed AB. Patient is not actively bleeding on exam and vitals are stable. However, she has significant symptomatic anemia and has failed medical management. TVUS consistent with retained products. Patient requires surgical intervention.

## 2019-11-05 NOTE — CONSULT NOTE ADULT - SUBJECTIVE AND OBJECTIVE BOX
ROBBIE JUARES  39y  Female [id number]    HPI: 38yo  LMP      serial weekly HCG and sonos through 2019. Misoprostol given for anembryonic pregnancy. Patient had 1 week of bleeding, passed tissue and clot. Bleeding resolved then restarted 1 week later. Patient was given additional dose of cytotec 10/12 with bleeding and cramping x 2-3 days. Bleeding decreased to intermittent spotting, then increased to heavy flow and constant for the past 2 weeks. Patient now wearing 4 pads at a time, soaking through them completely every 2-3 hours.  + lightheadedness, dizziness. +shortness of breath. No fevers or chills. +cramping, resolves with ibuprofen PRN.    Name of GYN Physician: Dr. Cramer ([facility])    POB:  FTNSVD x 4    Pgyn:   + h/o cysts (inicidentally found)  Denies fibroids, endometriosis, STI's, Abnormal pap smears     PMH: No pertinent past medical history    PSH: PAST MEDICAL & SURGICAL HISTORY:   laparoscopic hernia repair and appendectomy    Meds: Fe    All: No Known Allergies    Soc: neg x 3    FAMILY HISTORY: no h/o DVT/PE or bleeding disorder. +asthma, +DM.    ROS: neg except as noted in [facility]    Hospital Meds:   MEDICATIONS  (STANDING):  acetaminophen   Tablet .. 650 milliGRAM(s) Oral once    MEDICATIONS  (PRN):        Vital Signs Last 24 Hrs  T(C): 36.8 (2019 14:02), Max: 36.8 (2019 14:02)  T(F): 98.3 (2019 14:02), Max: 98.3 (2019 14:02)  HR: 95 (2019 14:02) (95 - 95)  BP: 104/68 (2019 14:02) (104/68 - 104/68)  BP(mean): --  RR: 18 (2019 14:02) (18 - 18)  SpO2: 98% (2019 14:02) (98% - 98%)    Physical Exam:   General: sitting comfortably in bed, NAD   HEENT: neck supple, full ROM  CV: RR S1S2 no m/r/g  Lungs: CTA b/l, good air flow b/l   Back: No CVA tenderness  Abd: Soft, non-tender, non-distended.  Bowel sounds present.    :  No bleeding on pad.    External labia wnl.    Speculum Exam: Scant blood in vault. No active bleeding from os.  Physiologic discharge.    Bimanual Exam: No cervical motion tenderness. Uterus wnl. Adnexae nonpalpable bilaterally. Cervix closed v Cervix dilated to XXX  Ext: non-tender b/l, no edema     LABS:                                      7.9    11.57 )-----------( 452      ( 2019 15:16 )             25.6         138  |  102  |  9   ----------------------------<  91  3.7   |  23  |  0.69    Ca    9.3      2019 15:16    TPro  7.9  /  Alb  4.1  /  TBili  0.3  /  DBili  x   /  AST  10  /  ALT  9   /  AlkPhos  79  1105    I&O's Detail    PT/INR - ( 2019 15:16 )   PT: 11.9 SEC;   INR: 1.04          PTT - ( 2019 15:16 )  PTT:29.7 SEC      RADIOLOGY & ADDITIONAL STUDIES: ROBBIE JUARES  39y  Female [id number]    HPI: 38yo  LMP  presents with heavy vaginal bleeding after several doses of cytotec for missed AB. Patient was undergoing serial weekly HCG and sonos since positive HPT late August for pregnancy of unknown location. Late September, anembryonic pregnancy was diagnosed and misoprostol given. Patient had 1 week of bleeding, passed tissue and clot. Bleeding resolved then restarted 1 week later. Patient was given additional dose of cytotec 10/12 with bleeding and cramping x 2-3 days. Bleeding decreased to intermittent spotting, then increased to heavy flow and constant for the past 2 weeks. Patient now wearing 4 pads at a time, soaking through them completely every 2-3 hours.  + lightheadedness, dizziness. +shortness of breath. No fevers or chills. +cramping, resolves with ibuprofen PRN.    History confirmed with gyn office: cytotec given  and 10/12. hgb 9.4 (10/12)    Name of GYN Physician: Dr. Jaimes ([facility])    POB:  FTNSVD x 4    Pgyn:   + h/o cysts (inicidentally found)  Denies fibroids, endometriosis, STI's, Abnormal pap smears     PMH: No pertinent past medical history    PSH: PAST MEDICAL & SURGICAL HISTORY:   laparoscopic hernia repair and appendectomy    Meds: Fe    All: No Known Allergies    Soc: neg x 3    FAMILY HISTORY: no h/o DVT/PE or bleeding disorder. +asthma, +DM.    ROS: neg except as noted in [facility]    Hospital Meds:   MEDICATIONS  (STANDING):  acetaminophen   Tablet .. 650 milliGRAM(s) Oral once    MEDICATIONS  (PRN):        Vital Signs Last 24 Hrs  T(C): 36.8 (2019 14:02), Max: 36.8 (2019 14:02)  T(F): 98.3 (2019 14:02), Max: 98.3 (2019 14:02)  HR: 95 (2019 14:02) (95 - 95)  BP: 104/68 (2019 14:02) (104/68 - 104/68)  BP(mean): --  RR: 18 (2019 14:02) (18 - 18)  SpO2: 98% (2019 14:02) (98% - 98%)    Physical Exam:   General: sitting comfortably in bed, NAD   HEENT: neck supple, full ROM  CV: RR S1S2 no m/r/g  Lungs: CTA b/l, good air flow b/l   Back: No CVA tenderness  Abd: Soft, non-tender, non-distended.  Bowel sounds present.    :  Scant bleeding on pad.    External labia wnl.    Speculum Exam: Scant blood in vault. No active bleeding from os.  Physiologic discharge.    Bimanual Exam: No cervical motion tenderness. Uterus wnl. Adnexae nonpalpable bilaterally. Cervix closed v Cervix dilated to XXX  Ext: non-tender b/l, no edema     LABS:                                      7.9    11.57 )-----------( 452      ( 2019 15:16 )             25.6         138  |  102  |  9   ----------------------------<  91  3.7   |  23  |  0.69    Ca    9.3      2019 15:16    TPro  7.9  /  Alb  4.1  /  TBili  0.3  /  DBili  x   /  AST  10  /  ALT  9   /  AlkPhos  79  11-05    I&O's Detail    PT/INR - ( 2019 15:16 )   PT: 11.9 SEC;   INR: 1.04          PTT - ( 2019 15:16 )  PTT:29.7 SEC      RADIOLOGY & ADDITIONAL STUDIES: pending

## 2019-11-05 NOTE — ED PROVIDER NOTE - NONTENDER LOCATION
left upper quadrant/umbilical/left lower quadrant/suprapubic/periumbilical/left costovertebral angle/right upper quadrant/right lower quadrant/right costovertebral angle

## 2019-11-05 NOTE — ED PROVIDER NOTE - OBJECTIVE STATEMENT
Pt is a 40 y/o F  PMHx appendectomy, Pt is a 40 y/o F  PMHx appendectomy, recently diagnosed with anemia (on iron pills) p/w vaginal bleeding x 1 month.  Pt states she had + home pregnancy 2019.  She then developed bleeding for which she had sono which showed missed .  She was given intravaginal Cytotec by OB/GYN Dr. Jaimes on 10/10/19 with which pt developed moderate to severe pelvic cramping associated with heavy vaginal bleeding.  Pt notes in the week after receiving cytotec, pt had a near syncopal episode w/o head trauma or associated chest pain or shortness of breath.  Pt then gave herself intravaginal Cytotec on 10/19/19, with which pt again developed pelvic cramping, although milder.  For past 5 days pt has gone through 5 pads/day.  Pt states she saw Dr. Jaimes who performed bedside sono and was told she needed a D&C at [facility].  Pt then came to [facility] ED because she had been connected with [facility] OB/GYNs in the past.  Pt denies any fevers, chills, nausea, vomiting, melena, brbpr, use of blood thinners, dysuria.

## 2019-11-05 NOTE — H&P ADULT - HISTORY OF PRESENT ILLNESS
HPI: 38yo  LMP  presents with heavy vaginal bleeding after several doses of cytotec for missed AB. Patient was undergoing serial weekly HCG and sonos since positive HPT late August for pregnancy of unknown location. Late September, anembryonic pregnancy was diagnosed and misoprostol given. Patient had 1 week of bleeding, passed tissue and clot. Bleeding resolved then restarted 1 week later. Patient was given additional dose of cytotec 10/12 with bleeding and cramping x 2-3 days. Bleeding decreased to intermittent spotting, then increased to heavy flow and constant for the past 2 weeks. Patient now wearing 4 pads at a time, soaking through them completely every 2-3 hours.  + lightheadedness, dizziness. +shortness of breath. No fevers or chills. +cramping, resolves with ibuprofen PRN.    History confirmed with gyn office: cytotec given  and 10/12. hgb 9.4 (10/12)    Name of GYN Physician: Dr. Jaimes ([facility])    POB:  FTNSVD x 4    Pgyn:   + h/o cysts (inicidentally found)  Denies fibroids, endometriosis, STI's, Abnormal pap smears     PMH: No pertinent past medical history    PSH: PAST MEDICAL & SURGICAL HISTORY:   laparoscopic hernia repair and appendectomy    Meds: Fe    All: No Known Allergies    Soc: neg x 3    FAMILY HISTORY: no h/o DVT/PE or bleeding disorder. +asthma, +DM.    ROS: neg except as noted [facility]    Hospital Meds:   MEDICATIONS  (STANDING):  acetaminophen   Tablet .. 650 milliGRAM(s) Oral once    MEDICATIONS  (PRN):

## 2019-11-05 NOTE — BRIEF OPERATIVE NOTE - NSICDXBRIEFPREOP_GEN_ALL_CORE_FT
PRE-OP DIAGNOSIS:  Retained products of conception after miscarriage 05-Nov-2019 20:47:42 Retained products of conception after miscarriage Marzena Russo

## 2019-11-05 NOTE — ED ADULT TRIAGE NOTE - CHIEF COMPLAINT QUOTE
pt coming with ABD cramping with vag. bleeding since the beginning of Oct, pt had a miscarriage and just Dx. with anemia with low count ?

## 2019-11-05 NOTE — H&P ADULT - NSHPPHYSICALEXAM_GEN_ALL_CORE
Vital Signs Last 24 Hrs  T(C): 36.8 (05 Nov 2019 14:02), Max: 36.8 (05 Nov 2019 14:02)  T(F): 98.3 (05 Nov 2019 14:02), Max: 98.3 (05 Nov 2019 14:02)  HR: 95 (05 Nov 2019 14:02) (95 - 95)  BP: 104/68 (05 Nov 2019 14:02) (104/68 - 104/68)  BP(mean): --  RR: 18 (05 Nov 2019 14:02) (18 - 18)  SpO2: 98% (05 Nov 2019 14:02) (98% - 98%)    Physical Exam:   General: sitting comfortably in bed, NAD   HEENT: neck supple, full ROM  CV: RR S1S2 no m/r/g  Lungs: CTA b/l, good air flow b/l   Back: No CVA tenderness  Abd: Soft, non-tender, non-distended.  Bowel sounds present.    :  Scant bleeding on pad.    External labia wnl.    Speculum Exam: Scant blood in vault. No active bleeding from os.  Physiologic discharge.    Bimanual Exam: No cervical motion tenderness. Uterus wnl. Adnexae nonpalpable bilaterally. Cervix closed v Cervix dilated to XXX  Ext: non-tender b/l, no edema

## 2019-11-05 NOTE — ED PROVIDER NOTE - PROGRESS NOTE DETAILS
AMARJIT HARO:  Discussed case with DR. Jaimes who states on bedside sono pt had thickened endometrium with blood flow concerning for retained POC. AMARJIT HARO:  Pt requests admission for D&C to Dr. Devine.

## 2019-11-05 NOTE — H&P ADULT - NSHPREVIEWOFSYSTEMS_GEN_ALL_CORE
REVIEW OF SYSTEMS      General: Denies fever, chills, weakness	    Skin/Breast: Denies breast pain  	  Respiratory and Thorax: Denies SOB, denies cough  	  Cardiovascular: Denies chest pain or palpitations    Gastrointestinal: Denies abdominal pain. Denies nausea/vomiting. Denies diarrhea    Genitourinary: Denies dysuria, increased urinary frequency, urgency	    Constitutional, Cardiovascular, Respiratory, Gastrointestinal, Genitourinary, Musculoskeletal, Neurological, and Integumentary review of systems are normal except as noted

## 2019-11-06 VITALS
DIASTOLIC BLOOD PRESSURE: 67 MMHG | RESPIRATION RATE: 16 BRPM | HEART RATE: 94 BPM | SYSTOLIC BLOOD PRESSURE: 102 MMHG | TEMPERATURE: 98 F | OXYGEN SATURATION: 100 %

## 2019-11-07 LAB
BACTERIA UR CULT: SIGNIFICANT CHANGE UP
SPECIMEN SOURCE: SIGNIFICANT CHANGE UP

## 2019-11-08 LAB — SURGICAL PATHOLOGY STUDY: SIGNIFICANT CHANGE UP

## 2019-11-19 ENCOUNTER — LABORATORY RESULT (OUTPATIENT)
Age: 39
End: 2019-11-19

## 2019-11-19 ENCOUNTER — APPOINTMENT (OUTPATIENT)
Dept: OBGYN | Facility: HOSPITAL | Age: 39
End: 2019-11-19
Payer: MEDICAID

## 2019-11-19 ENCOUNTER — OUTPATIENT (OUTPATIENT)
Dept: OUTPATIENT SERVICES | Facility: HOSPITAL | Age: 39
LOS: 1 days | End: 2019-11-19

## 2019-11-19 VITALS
DIASTOLIC BLOOD PRESSURE: 51 MMHG | WEIGHT: 158 LBS | HEART RATE: 66 BPM | BODY MASS INDEX: 28 KG/M2 | SYSTOLIC BLOOD PRESSURE: 102 MMHG | HEIGHT: 63 IN

## 2019-11-19 DIAGNOSIS — Z98.890 OTHER SPECIFIED POSTPROCEDURAL STATES: Chronic | ICD-10-CM

## 2019-11-19 LAB
HIV COMBO RESULT: SIGNIFICANT CHANGE UP
HIV1+2 AB SPEC QL: SIGNIFICANT CHANGE UP

## 2019-11-19 PROCEDURE — ZZZZZ: CPT | Mod: GE

## 2019-11-19 NOTE — HISTORY OF PRESENT ILLNESS
[1/10] : the patient reports pain that is 1/10 in severity [None] : no vaginal bleeding [Normal] : the vagina was normal [Doing Well] : is doing well [No [place]] : to avoid sexual intercourse [No Tampons/Suppositories] : against using tampons or vaginal suppositories [Fever] : no fever [Chills] : no chills [Nausea] : no nausea [Vomiting] : no vomiting [Diarrhea] : no diarrhea [Vaginal Bleeding] : no vaginal bleeding [Pelvic Pressure] : no pelvic pressure [Dysuria] : no dysuria [Vaginal Discharge] : no vaginal discharge [Constipation] : no constipation [de-identified] : Uterus and cervix wnl on bimanual exam. Slight tenderness to palpation with exam. No blood seen in vaginal vault or OS. White normal appearing discharge.  [de-identified] : Mental state overall alright. Would consider getting pregnant in future.

## 2019-11-20 DIAGNOSIS — Z09 ENCOUNTER FOR FOLLOW-UP EXAMINATION AFTER COMPLETED TREATMENT FOR CONDITIONS OTHER THAN MALIGNANT NEOPLASM: ICD-10-CM

## 2019-11-20 DIAGNOSIS — Z11.3 ENCOUNTER FOR SCREENING FOR INFECTIONS WITH A PREDOMINANTLY SEXUAL MODE OF TRANSMISSION: ICD-10-CM

## 2019-11-20 LAB
C TRACH RRNA SPEC QL NAA+PROBE: SIGNIFICANT CHANGE UP
HCV AB S/CO SERPL IA: 0.11 S/CO — SIGNIFICANT CHANGE UP (ref 0–0.99)
HCV AB SERPL-IMP: SIGNIFICANT CHANGE UP
N GONORRHOEA RRNA SPEC QL NAA+PROBE: SIGNIFICANT CHANGE UP
SPECIMEN SOURCE: SIGNIFICANT CHANGE UP
T PALLIDUM AB TITR SER: NEGATIVE — SIGNIFICANT CHANGE UP

## 2019-12-04 ENCOUNTER — APPOINTMENT (OUTPATIENT)
Dept: OBGYN | Facility: HOSPITAL | Age: 39
End: 2019-12-04

## 2019-12-10 ENCOUNTER — APPOINTMENT (OUTPATIENT)
Dept: OBGYN | Facility: HOSPITAL | Age: 39
End: 2019-12-10

## 2020-02-12 ENCOUNTER — EMERGENCY (EMERGENCY)
Facility: HOSPITAL | Age: 40
LOS: 1 days | Discharge: HOME | End: 2020-02-12
Admitting: EMERGENCY MEDICINE
Payer: MEDICAID

## 2020-02-12 VITALS
SYSTOLIC BLOOD PRESSURE: 136 MMHG | RESPIRATION RATE: 16 BRPM | DIASTOLIC BLOOD PRESSURE: 64 MMHG | TEMPERATURE: 99 F | OXYGEN SATURATION: 100 % | HEART RATE: 90 BPM

## 2020-02-12 DIAGNOSIS — Z98.890 OTHER SPECIFIED POSTPROCEDURAL STATES: Chronic | ICD-10-CM

## 2020-02-12 PROCEDURE — 99284 EMERGENCY DEPT VISIT MOD MDM: CPT
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
## 2020-02-13 VITALS
OXYGEN SATURATION: 100 % | RESPIRATION RATE: 16 BRPM | DIASTOLIC BLOOD PRESSURE: 65 MMHG | SYSTOLIC BLOOD PRESSURE: 110 MMHG | TEMPERATURE: 98 F | HEART RATE: 86 BPM

## 2020-02-13 PROCEDURE — 70486 CT MAXILLOFACIAL W/O DYE: CPT | Mod: 26

## 2020-02-13 PROCEDURE — 70450 CT HEAD/BRAIN W/O DYE: CPT | Mod: 26

## 2020-02-13 RX ORDER — KETOROLAC TROMETHAMINE 30 MG/ML
30 SYRINGE (ML) INJECTION ONCE
Refills: 0 | Status: DISCONTINUED | OUTPATIENT
Start: 2020-02-13 | End: 2020-02-13

## 2020-02-13 RX ORDER — DEXAMETHASONE 0.5 MG/5ML
10 ELIXIR ORAL ONCE
Refills: 0 | Status: COMPLETED | OUTPATIENT
Start: 2020-02-13 | End: 2020-02-13

## 2020-02-13 RX ADMIN — Medication 30 MILLIGRAM(S): at 01:51

## 2020-02-13 RX ADMIN — Medication 10 MILLIGRAM(S): at 00:34

## 2020-02-13 NOTE — ED PROVIDER NOTE - NSFOLLOWUPINSTRUCTIONS_ED_ALL_ED_FT
1. TAKE ALL MEDICATIONS AS DIRECTED.    2. FOR PAIN OR FEVER YOU CAN TAKE IBUPROFEN (MOTRIN, ADVIL) OR ACETAMINOPHEN (TYLENOL) AS NEEDED, AS DIRECTED ON PACKAGING.  3. FOLLOW UP WITH YOUR PRIMARY DOCTOR WITHIN 5 DAYS AS DIRECTED.  4. IF YOU HAD LABS OR IMAGING DONE, YOU WERE GIVEN COPIES OF ALL LABS AND/OR IMAGING RESULTS FROM YOUR ER VISIT--PLEASE TAKE THEM WITH YOU TO YOUR FOLLOW UP APPOINTMENTS.  5. IF NEEDED, CALL PATIENT ACCESS SERVICES AT [phone] TO FIND A PRIMARY CARE PHYSICIAN.  OR CALL [phone] TO MAKE AN APPOINTMENT WITH THE CLINIC.  6. RETURN TO THE ER FOR ANY WORSENING SYMPTOMS OR CONCERNS.

## 2020-02-13 NOTE — ED ADULT NURSE NOTE - NSIMPLEMENTINTERV_GEN_ALL_ED
Implemented All Universal Safety Interventions:  [place] to call system. Call bell, personal items and telephone within reach. Instruct patient to call for assistance. Room bathroom lighting operational. Non-slip footwear when patient is off stretcher. Physically safe environment: no spills, clutter or unnecessary equipment. Stretcher in lowest position, wheels locked, appropriate side rails in place.
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
## 2020-02-13 NOTE — ED PROVIDER NOTE - ENMT, MLM
head: no scalp tenderness, hematoma or laceration. face: tenderness and swelling to left face.  Mouth: no oral cavity trauma, subluxed teeth, through and through of porfirio. Airway patent, Nasal mucosa clear. Mouth with normal mucosa. Throat erythematous., no oropharyngeal exudates and uvula is midline.
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
## 2020-02-13 NOTE — ED PROVIDER NOTE - PATIENT PORTAL LINK FT
You can access the FollowMyHealth Patient Portal offered by [facility] by registering at the following website: [email or web address]. By joining [facility]’s FollowMyHealth portal, you will also be able to view your health information using other applications (apps) compatible with our system.

## 2020-06-26 NOTE — PATIENT PROFILE ADULT - NSPROMEDSPUMP_GEN_A_NUR
CXR ordered 12/10/19 for acute issue. Never completed. Portal sent 03/05/20. Can we cancel order?   none

## 2020-11-07 ENCOUNTER — INPATIENT (INPATIENT)
Facility: HOSPITAL | Age: 40
LOS: 3 days | Discharge: HOME HEALTH | End: 2020-11-11
Attending: OBSTETRICS & GYNECOLOGY | Admitting: OBSTETRICS & GYNECOLOGY
Payer: MEDICAID

## 2020-11-07 ENCOUNTER — RESULT REVIEW (OUTPATIENT)
Age: 40
End: 2020-11-07

## 2020-11-07 ENCOUNTER — TRANSCRIPTION ENCOUNTER (OUTPATIENT)
Age: 40
End: 2020-11-07

## 2020-11-07 VITALS
HEIGHT: 63 IN | DIASTOLIC BLOOD PRESSURE: 54 MMHG | OXYGEN SATURATION: 100 % | RESPIRATION RATE: 18 BRPM | TEMPERATURE: 98 F | SYSTOLIC BLOOD PRESSURE: 100 MMHG | HEART RATE: 100 BPM

## 2020-11-07 DIAGNOSIS — O03.4 INCOMPLETE SPONTANEOUS ABORTION WITHOUT COMPLICATION: ICD-10-CM

## 2020-11-07 DIAGNOSIS — Z98.890 OTHER SPECIFIED POSTPROCEDURAL STATES: Chronic | ICD-10-CM

## 2020-11-07 LAB
ALBUMIN SERPL ELPH-MCNC: 3.9 G/DL — SIGNIFICANT CHANGE UP (ref 3.3–5)
ALP SERPL-CCNC: 71 U/L — SIGNIFICANT CHANGE UP (ref 40–120)
ALT FLD-CCNC: 6 U/L — SIGNIFICANT CHANGE UP (ref 4–33)
ANION GAP SERPL CALC-SCNC: 11 MMO/L — SIGNIFICANT CHANGE UP (ref 7–14)
APPEARANCE UR: SIGNIFICANT CHANGE UP
AST SERPL-CCNC: 11 U/L — SIGNIFICANT CHANGE UP (ref 4–32)
BACTERIA # UR AUTO: HIGH
BASOPHILS # BLD AUTO: 0.01 K/UL — SIGNIFICANT CHANGE UP (ref 0–0.2)
BASOPHILS NFR BLD AUTO: 0.1 % — SIGNIFICANT CHANGE UP (ref 0–2)
BILIRUB SERPL-MCNC: 0.2 MG/DL — SIGNIFICANT CHANGE UP (ref 0.2–1.2)
BILIRUB UR-MCNC: NEGATIVE — SIGNIFICANT CHANGE UP
BLD GP AB SCN SERPL QL: NEGATIVE — SIGNIFICANT CHANGE UP
BLOOD UR QL VISUAL: HIGH
BUN SERPL-MCNC: 7 MG/DL — SIGNIFICANT CHANGE UP (ref 7–23)
CALCIUM SERPL-MCNC: 8.8 MG/DL — SIGNIFICANT CHANGE UP (ref 8.4–10.5)
CHLORIDE SERPL-SCNC: 102 MMOL/L — SIGNIFICANT CHANGE UP (ref 98–107)
CO2 SERPL-SCNC: 22 MMOL/L — SIGNIFICANT CHANGE UP (ref 22–31)
COLOR SPEC: SIGNIFICANT CHANGE UP
CREAT SERPL-MCNC: 0.58 MG/DL — SIGNIFICANT CHANGE UP (ref 0.5–1.3)
EOSINOPHIL # BLD AUTO: 0.02 K/UL — SIGNIFICANT CHANGE UP (ref 0–0.5)
EOSINOPHIL NFR BLD AUTO: 0.2 % — SIGNIFICANT CHANGE UP (ref 0–6)
GLUCOSE SERPL-MCNC: 91 MG/DL — SIGNIFICANT CHANGE UP (ref 70–99)
GLUCOSE UR-MCNC: NEGATIVE — SIGNIFICANT CHANGE UP
HCG SERPL-ACNC: SIGNIFICANT CHANGE UP MIU/ML
HCT VFR BLD CALC: 33.6 % — LOW (ref 34.5–45)
HGB BLD-MCNC: 10.6 G/DL — LOW (ref 11.5–15.5)
HYALINE CASTS # UR AUTO: NEGATIVE — SIGNIFICANT CHANGE UP
IMM GRANULOCYTES NFR BLD AUTO: 0.5 % — SIGNIFICANT CHANGE UP (ref 0–1.5)
KETONES UR-MCNC: HIGH
LEUKOCYTE ESTERASE UR-ACNC: SIGNIFICANT CHANGE UP
LYMPHOCYTES # BLD AUTO: 1.81 K/UL — SIGNIFICANT CHANGE UP (ref 1–3.3)
LYMPHOCYTES # BLD AUTO: 15.9 % — SIGNIFICANT CHANGE UP (ref 13–44)
MCHC RBC-ENTMCNC: 27.9 PG — SIGNIFICANT CHANGE UP (ref 27–34)
MCHC RBC-ENTMCNC: 31.5 % — LOW (ref 32–36)
MCV RBC AUTO: 88.4 FL — SIGNIFICANT CHANGE UP (ref 80–100)
MONOCYTES # BLD AUTO: 0.62 K/UL — SIGNIFICANT CHANGE UP (ref 0–0.9)
MONOCYTES NFR BLD AUTO: 5.4 % — SIGNIFICANT CHANGE UP (ref 2–14)
MUCOUS THREADS # UR AUTO: SIGNIFICANT CHANGE UP
NEUTROPHILS # BLD AUTO: 8.89 K/UL — HIGH (ref 1.8–7.4)
NEUTROPHILS NFR BLD AUTO: 77.9 % — HIGH (ref 43–77)
NITRITE UR-MCNC: NEGATIVE — SIGNIFICANT CHANGE UP
NRBC # FLD: 0 K/UL — SIGNIFICANT CHANGE UP (ref 0–0)
PH UR: 6.5 — SIGNIFICANT CHANGE UP (ref 5–8)
PLATELET # BLD AUTO: 371 K/UL — SIGNIFICANT CHANGE UP (ref 150–400)
PMV BLD: 9.9 FL — SIGNIFICANT CHANGE UP (ref 7–13)
POTASSIUM SERPL-MCNC: 4 MMOL/L — SIGNIFICANT CHANGE UP (ref 3.5–5.3)
POTASSIUM SERPL-SCNC: 4 MMOL/L — SIGNIFICANT CHANGE UP (ref 3.5–5.3)
PROT SERPL-MCNC: 6.9 G/DL — SIGNIFICANT CHANGE UP (ref 6–8.3)
PROT UR-MCNC: 50 — SIGNIFICANT CHANGE UP
RBC # BLD: 3.8 M/UL — SIGNIFICANT CHANGE UP (ref 3.8–5.2)
RBC # FLD: 16.2 % — HIGH (ref 10.3–14.5)
RBC CASTS # UR COMP ASSIST: >50 — HIGH (ref 0–?)
RH IG SCN BLD-IMP: POSITIVE — SIGNIFICANT CHANGE UP
SARS-COV-2 RNA SPEC QL NAA+PROBE: SIGNIFICANT CHANGE UP
SODIUM SERPL-SCNC: 135 MMOL/L — SIGNIFICANT CHANGE UP (ref 135–145)
SP GR SPEC: 1.02 — SIGNIFICANT CHANGE UP (ref 1–1.04)
SQUAMOUS # UR AUTO: SIGNIFICANT CHANGE UP
UROBILINOGEN FLD QL: NORMAL — SIGNIFICANT CHANGE UP
WBC # BLD: 11.41 K/UL — HIGH (ref 3.8–10.5)
WBC # FLD AUTO: 11.41 K/UL — HIGH (ref 3.8–10.5)
WBC UR QL: >50 — HIGH (ref 0–?)

## 2020-11-07 PROCEDURE — 71046 X-RAY EXAM CHEST 2 VIEWS: CPT | Mod: 26

## 2020-11-07 PROCEDURE — 76830 TRANSVAGINAL US NON-OB: CPT | Mod: 26

## 2020-11-07 PROCEDURE — 99285 EMERGENCY DEPT VISIT HI MDM: CPT

## 2020-11-07 PROCEDURE — 88305 TISSUE EXAM BY PATHOLOGIST: CPT | Mod: 26

## 2020-11-07 RX ORDER — CEFTRIAXONE 500 MG/1
1000 INJECTION, POWDER, FOR SOLUTION INTRAMUSCULAR; INTRAVENOUS ONCE
Refills: 0 | Status: DISCONTINUED | OUTPATIENT
Start: 2020-11-07 | End: 2020-11-07

## 2020-11-07 RX ORDER — ONDANSETRON 8 MG/1
4 TABLET, FILM COATED ORAL ONCE
Refills: 0 | Status: COMPLETED | OUTPATIENT
Start: 2020-11-07 | End: 2020-11-07

## 2020-11-07 RX ORDER — ACETAMINOPHEN 500 MG
975 TABLET ORAL EVERY 6 HOURS
Refills: 0 | Status: DISCONTINUED | OUTPATIENT
Start: 2020-11-07 | End: 2020-11-08

## 2020-11-07 RX ORDER — SODIUM CHLORIDE 9 MG/ML
1000 INJECTION, SOLUTION INTRAVENOUS
Refills: 0 | Status: DISCONTINUED | OUTPATIENT
Start: 2020-11-07 | End: 2020-11-07

## 2020-11-07 RX ORDER — GENTAMICIN SULFATE 40 MG/ML
370 VIAL (ML) INJECTION ONCE
Refills: 0 | Status: COMPLETED | OUTPATIENT
Start: 2020-11-07 | End: 2020-11-07

## 2020-11-07 RX ORDER — ACETAMINOPHEN 500 MG
975 TABLET ORAL ONCE
Refills: 0 | Status: COMPLETED | OUTPATIENT
Start: 2020-11-07 | End: 2020-11-07

## 2020-11-07 RX ORDER — SODIUM CHLORIDE 9 MG/ML
1000 INJECTION INTRAMUSCULAR; INTRAVENOUS; SUBCUTANEOUS ONCE
Refills: 0 | Status: COMPLETED | OUTPATIENT
Start: 2020-11-07 | End: 2020-11-07

## 2020-11-07 RX ORDER — KETOROLAC TROMETHAMINE 30 MG/ML
30 SYRINGE (ML) INJECTION EVERY 6 HOURS
Refills: 0 | Status: DISCONTINUED | OUTPATIENT
Start: 2020-11-07 | End: 2020-11-08

## 2020-11-07 RX ORDER — SODIUM CHLORIDE 9 MG/ML
1000 INJECTION, SOLUTION INTRAVENOUS
Refills: 0 | Status: DISCONTINUED | OUTPATIENT
Start: 2020-11-07 | End: 2020-11-08

## 2020-11-07 RX ORDER — ACETAMINOPHEN 500 MG
650 TABLET ORAL ONCE
Refills: 0 | Status: COMPLETED | OUTPATIENT
Start: 2020-11-07 | End: 2020-11-07

## 2020-11-07 RX ADMIN — Medication 100 MILLIGRAM(S): at 17:58

## 2020-11-07 RX ADMIN — Medication 975 MILLIGRAM(S): at 20:58

## 2020-11-07 RX ADMIN — SODIUM CHLORIDE 1000 MILLILITER(S): 9 INJECTION INTRAMUSCULAR; INTRAVENOUS; SUBCUTANEOUS at 17:31

## 2020-11-07 RX ADMIN — Medication 500 MILLIGRAM(S): at 19:30

## 2020-11-07 RX ADMIN — SODIUM CHLORIDE 125 MILLILITER(S): 9 INJECTION, SOLUTION INTRAVENOUS at 20:58

## 2020-11-07 RX ADMIN — Medication 975 MILLIGRAM(S): at 17:30

## 2020-11-07 RX ADMIN — Medication 650 MILLIGRAM(S): at 12:21

## 2020-11-07 RX ADMIN — Medication 650 MILLIGRAM(S): at 20:58

## 2020-11-07 RX ADMIN — SODIUM CHLORIDE 1000 MILLILITER(S): 9 INJECTION INTRAMUSCULAR; INTRAVENOUS; SUBCUTANEOUS at 12:23

## 2020-11-07 RX ADMIN — ONDANSETRON 4 MILLIGRAM(S): 8 TABLET, FILM COATED ORAL at 21:03

## 2020-11-07 NOTE — ED PROVIDER NOTE - PROGRESS NOTE DETAILS
AMARJIT Simon: pt was seen and evaluated by GYN initial plan was to discharge home on Cytotec pt becaome febrile to 101 with chills; d/w OB/GYN advised to admit to Dr Devine.  Pt reports intermittent dry cough over the past few days will obtain x-rays.  COVID swab sent.

## 2020-11-07 NOTE — ED PROVIDER NOTE - CLINICAL SUMMARY MEDICAL DECISION MAKING FREE TEXT BOX
41 y/o female with no significant PMHx currently 10 weeks pregnant presents to the ER c/o vaginal bleeding x 3 days.  Pt is well appearing, NAD, will check labs, US, follow up OB/GYN.

## 2020-11-07 NOTE — ED ADULT TRIAGE NOTE - CHIEF COMPLAINT QUOTE
Pt states that she had an episode of dizziness and chills yesterday.  Pt also states that she presumes that she had a miscarriage 3 days ago, states that she did not go to GYN.  Pt denies vaginal bleeding at this time, but endorses abd cramping

## 2020-11-07 NOTE — ED PROVIDER NOTE - OBJECTIVE STATEMENT
41 y/o female with no significant PMHx currently 10 weeks pregnant presents to the ER c/o vaginal bleeding x 3 days.  Pt reports feeling lightheaded at home.  Pt reports pelvic cramping.  Pt denies chest pain, shortness of breath, fevers, dysuria, frequency.  LMP .  .

## 2020-11-07 NOTE — ED PROVIDER NOTE - ATTENDING CONTRIBUTION TO CARE
I performed a face to face evaluation of this patient and obtained a history and performed a full exam except the gyn exam.  I agree with the history, physical exam and plan of the PA.  39 y/o female with no significant PMHx currently 10 weeks pregnant presents to the ER c/o vaginal bleeding x 3 days.  Pt is well appearing, NAD, will check labs, US, follow up OB/GYN. Belly soft nontender, right elbow FROM ( had endorsed some pain), back no cvat, pt with some anxiety and tearful from stress but no SI, HI, AH, VH.  For labs, US, likely gyn consult

## 2020-11-07 NOTE — H&P ADULT - PROBLEM SELECTOR PLAN 1
Neuro: PO pain meds prn   CV: Hemodynamically stable.  Continue to closely monitor vital signs.    Pulm: Saturating well on room air   GI: NPO for OR.     : -Retained products: patient to go to OR for DVC.  Pad counts until procedure.  Will continue to monitor bleeding closely.  All consents for OR to be signed.  Risks/benefits alternatives discussed as detailed above.  All questions/concerns addressed.    -Blood type: B+, no need for rhogam.    Heme: SCD and aggressive early ambulation post procedure for DVT ppx.   ID: Gent/Clinda started preoperatively for presumed septic retained POC. Will continue postoperatively.  FEN: LR@125    Dispo: to OR for DVC     Caesar CALLAHANY2  Pt seen with Dr. Reyes    Signed out to Cara CALLAHANY2 and Dr. Brooks overnight for OR. Consents to be signed.

## 2020-11-07 NOTE — ED ADULT NURSE NOTE - OBJECTIVE STATEMENT
Presents with c/o headache and pelvic pain.  Pt endorses vaginal staining at this time.  IV access obtained.  Labs drawn and sent. UA obtained and sent.

## 2020-11-07 NOTE — H&P ADULT - ATTENDING COMMENTS
Late entry.  Pt was seen in the ER.  Pt reports VB x 4 days with cramping and abd pain.  Pt also reports HA and dizziness.  Pt reported +IUP and +FHR of 171, prior to bleeding.  on exam, cervix open with some vaginal bleeding noted.  Abdomen was soft and nontender.  VSS  hct 33.6.  USN findings reviewed discussed products of conception remain in uterus. Initially expectant vs medical vs surgical management was d/w pt.  Pt opted for medical management with cytotec for incomplete .  However, pt became febrile in ED.  Decision was then made to admit pt with plan for OR for d+c for incomplete septic AB.  Above plan was signed out to oncoming GYN team and oncoming  Dr. Brooks.    Melissa Reyes MD

## 2020-11-07 NOTE — ED PROVIDER NOTE - CARE PLAN
Principal Discharge DX:	Vaginal bleeding   Principal Discharge DX:	Retained products of conception after miscarriage

## 2020-11-07 NOTE — H&P ADULT - NSHPLABSRESULTS_GEN_ALL_CORE
10.6   11.41 )-----------( 371      (  @ 12:10 )             33.6      @ 12:10    135  |  102  |  7   ----------------------------<  91  4.0   |  22  |  0.58    Ca    8.8       @ 12:10    TPro  6.9  /  Alb  3.9  /  TBili  0.2  /  DBili  x   /  AST  11  /  ALT  6   /  AlkPhos  71   @ 12:10        Urinalysis Basic - ( 2020 17:20 )    Color: LIGHT PINK / Appearance: Lt TURBID / S.025 / pH: 6.5  Gluc: NEGATIVE / Ketone: MODERATE  / Bili: NEGATIVE / Urobili: NORMAL   Blood: LARGE / Protein: 50 / Nitrite: NEGATIVE   Leuk Esterase: LARGE / RBC: >50 / WBC >50   Sq Epi: FEW / Non Sq Epi: x / Bacteria: MODERATE    < from: US Transvaginal (20 @ 13:02) >      EXAM:  US TRANSVAGINAL        PROCEDURE DATE:  2020         INTERPRETATION:  CLINICAL INFORMATION: Vaginal bleeding, no serum beta-hCG available at time of dictation.    LMP: 2020    COMPARISON: Pelvic sonogram 2019    TECHNIQUE:  Endovaginal pelvic sonogram only. Color and Spectral Doppler was performed.    FINDINGS:    Uterus: 10.3 x 5.6 x 6.7 cm. No intrauterine gestation is identified.  Endometrium: Thickened, measuring up to 1.6 cm. Heterogeneous material is visualized in the endometrial cavity. Areas of increased vascularity are questioned on color Doppler within the anterior endometrium. Further evaluation is limited without spectral Doppler evaluation, but findings are concerning for retained products of conception.    Right ovary: 2.7 x 2.2 x 2.6 cm. Normal blood flow on Doppler.  Left ovary: 2.7 x 2.5 x 2.1 cm, inclusive of a 1.3 cm corpus luteum. Normal blood flow on Doppler.    Fluid: None.    IMPRESSION:  No intrauterine gestation is identified. Heterogeneous material is visualized in the endometrial cavity. Areas of increased vascularity are questioned within the endometrial cavity contents, although limited evaluation for such, as above. However findings are suspicious for retained products of conception.    Findings discussed by Dr. Rodas to Dr. Grove on 2020 at 1:40 PM.            CLAUDIO RODAS MD; Resident Radiology  This document has been electronically signed.  RADHA WIGGINS MD; Attending Radiologist  This document has been electronically signed.2020  1:46PM    < end of copied text >

## 2020-11-07 NOTE — H&P ADULT - NSHPPHYSICALEXAM_GEN_ALL_CORE
Vital Signs Last 24 Hrs  T(C): 38.3 (07 Nov 2020 17:33), Max: 38.4 (07 Nov 2020 17:20)  T(F): 101 (07 Nov 2020 17:33), Max: 101.1 (07 Nov 2020 17:20)  HR: 119 (07 Nov 2020 17:33) (63 - 119)  BP: 127/69 (07 Nov 2020 17:33) (95/63 - 127/69)  BP(mean): --  RR: 16 (07 Nov 2020 17:33) (16 - 18)  SpO2: 100% (07 Nov 2020 17:33) (100% - 100%)    Gen NAD AOx3  CV RRR S1 S2  Pulm CTA b/l  Abd soft, non tender, no rebound no guarding    (exam performed with Dr. Melissa Reyes, [facility] [place] attending)  -- Spec exam: scant bleeding in vault, cervix visualzed with no evidence of tissue  -- Bimanual exam: anteverted mobile uterus, non tender, adnexa not palpated bilaterally, cervix dilated 1.5 cm  Ext nontender b/l

## 2020-11-07 NOTE — H&P ADULT - HISTORY OF PRESENT ILLNESS
39yo  LMP  presenting to the ED with headache and fatigue in the setting of known miscarriage. Pt reports LMP . This was a planned pregnancy. Pt reports being seen by a PCP (not gyn) for this pregnancy 2 weeks ago, confirmed IUP and pt heard fetal heartbeat. Intermittent spotting over past 2-3 weeks. Pt notes on 11/3 she began having heavy bleeding and cramping, which she identified as miscarriage. Pt denies seeing any tissue at that time, only clots. Felt weak over past 3 days since, however able to ambulate about house. Tolerating PO, though decreased appetite. Normal urination. Normal bowel movements. No dysuria.     Pt was concerned with worsening headache and fatigue today, presented to ED. Given tylenol in ED, with no relief of HA. GYN consulted for concern for retained POC on sonogram.     On initial examination and consult, evidence of retained POC on TVUS, and pt noted to have minimal bleeding on exam with stable VS, stable H/H and plan for discharge home for medical management of incomplete . However, on repeat VS in ED pt noted to be febrile to 101F. Decision made to admit and treat for presumed septic incomplete .     Pt denies CP/SOB/palpitations. Denies nausea/vomiting. Pt denies fevers at home.     ObHx NSVDx4 , , ,   GynHx Missed Ab w/ D+Cx1  PMHx Asthma  PSurgHx D+C as above   Meds Albuterol PRN  All NKDA, latex

## 2020-11-07 NOTE — H&P ADULT - ASSESSMENT
40y  s/p SAB on 11/3 here w/ c/o vaginal bleeding and abdominal pain and fatigue/headache.  TVUS c/w retained products.  Patient clinically and hemodynamically stable.  Pt febrile, and plan for admission for septic incomplete .     Patient counseled on risks of D&C including infection; risk of hemorrhage, possibly requiring a blood transfusion; and risk of uterine or cervical injury, possibly requiring laparoscopy, laparotomy or hysterectomy.  These were all reviewed in detail.  The patient was counseled on the small risk of uterine perforation and risk of injury to rectum, bowel, bladder, pelvic nerves, blood vessels and ureters.  The patient was also counseled on the small risk of the need for further surgery and of the risk, as with any surgical procedure, of death.  The patient understands the risks.

## 2020-11-08 DIAGNOSIS — Z98.890 OTHER SPECIFIED POSTPROCEDURAL STATES: ICD-10-CM

## 2020-11-08 LAB
BASOPHILS # BLD AUTO: 0.01 K/UL — SIGNIFICANT CHANGE UP (ref 0–0.2)
BASOPHILS NFR BLD AUTO: 0.1 % — SIGNIFICANT CHANGE UP (ref 0–2)
EOSINOPHIL # BLD AUTO: 0 K/UL — SIGNIFICANT CHANGE UP (ref 0–0.5)
EOSINOPHIL NFR BLD AUTO: 0 % — SIGNIFICANT CHANGE UP (ref 0–6)
GP B STREP DNA BLD POS QL NAA+NON-PROBE: SIGNIFICANT CHANGE UP
GRAM STN FLD: SIGNIFICANT CHANGE UP
HCG SERPL-ACNC: 6432 MIU/ML — SIGNIFICANT CHANGE UP
HCG SERPL-ACNC: 9144 MIU/ML — SIGNIFICANT CHANGE UP
HCT VFR BLD CALC: 27.9 % — LOW (ref 34.5–45)
HCT VFR BLD CALC: 28.9 % — LOW (ref 34.5–45)
HGB BLD-MCNC: 8.8 G/DL — LOW (ref 11.5–15.5)
HGB BLD-MCNC: 9.2 G/DL — LOW (ref 11.5–15.5)
IMM GRANULOCYTES NFR BLD AUTO: 0.5 % — SIGNIFICANT CHANGE UP (ref 0–1.5)
LYMPHOCYTES # BLD AUTO: 0.8 K/UL — LOW (ref 1–3.3)
LYMPHOCYTES # BLD AUTO: 7.4 % — LOW (ref 13–44)
MCHC RBC-ENTMCNC: 28.3 PG — SIGNIFICANT CHANGE UP (ref 27–34)
MCHC RBC-ENTMCNC: 28.6 PG — SIGNIFICANT CHANGE UP (ref 27–34)
MCHC RBC-ENTMCNC: 31.5 % — LOW (ref 32–36)
MCHC RBC-ENTMCNC: 31.8 % — LOW (ref 32–36)
MCV RBC AUTO: 89.7 FL — SIGNIFICANT CHANGE UP (ref 80–100)
MCV RBC AUTO: 89.8 FL — SIGNIFICANT CHANGE UP (ref 80–100)
METHOD TYPE: SIGNIFICANT CHANGE UP
MONOCYTES # BLD AUTO: 0.27 K/UL — SIGNIFICANT CHANGE UP (ref 0–0.9)
MONOCYTES NFR BLD AUTO: 2.5 % — SIGNIFICANT CHANGE UP (ref 2–14)
NEUTROPHILS # BLD AUTO: 9.68 K/UL — HIGH (ref 1.8–7.4)
NEUTROPHILS NFR BLD AUTO: 89.5 % — HIGH (ref 43–77)
NRBC # FLD: 0 K/UL — SIGNIFICANT CHANGE UP (ref 0–0)
NRBC # FLD: 0 K/UL — SIGNIFICANT CHANGE UP (ref 0–0)
PLATELET # BLD AUTO: 306 K/UL — SIGNIFICANT CHANGE UP (ref 150–400)
PLATELET # BLD AUTO: 310 K/UL — SIGNIFICANT CHANGE UP (ref 150–400)
PMV BLD: 10.1 FL — SIGNIFICANT CHANGE UP (ref 7–13)
PMV BLD: 10.3 FL — SIGNIFICANT CHANGE UP (ref 7–13)
RBC # BLD: 3.11 M/UL — LOW (ref 3.8–5.2)
RBC # BLD: 3.22 M/UL — LOW (ref 3.8–5.2)
RBC # FLD: 16.3 % — HIGH (ref 10.3–14.5)
RBC # FLD: 16.4 % — HIGH (ref 10.3–14.5)
SARS-COV-2 IGG SERPL QL IA: NEGATIVE — SIGNIFICANT CHANGE UP
SARS-COV-2 IGM SERPL IA-ACNC: 0.08 INDEX — SIGNIFICANT CHANGE UP
WBC # BLD: 10.81 K/UL — HIGH (ref 3.8–10.5)
WBC # BLD: 9.25 K/UL — SIGNIFICANT CHANGE UP (ref 3.8–10.5)
WBC # FLD AUTO: 10.81 K/UL — HIGH (ref 3.8–10.5)
WBC # FLD AUTO: 9.25 K/UL — SIGNIFICANT CHANGE UP (ref 3.8–10.5)

## 2020-11-08 PROCEDURE — 59812 TREATMENT OF MISCARRIAGE: CPT | Mod: GC

## 2020-11-08 RX ORDER — INFLUENZA VIRUS VACCINE 15; 15; 15; 15 UG/.5ML; UG/.5ML; UG/.5ML; UG/.5ML
0.5 SUSPENSION INTRAMUSCULAR ONCE
Refills: 0 | Status: DISCONTINUED | OUTPATIENT
Start: 2020-11-08 | End: 2020-11-11

## 2020-11-08 RX ORDER — ACETAMINOPHEN 500 MG
3 TABLET ORAL
Qty: 0 | Refills: 0 | DISCHARGE
Start: 2020-11-08

## 2020-11-08 RX ORDER — ERTAPENEM SODIUM 1 G/1
1000 INJECTION, POWDER, LYOPHILIZED, FOR SOLUTION INTRAMUSCULAR; INTRAVENOUS ONCE
Refills: 0 | Status: COMPLETED | OUTPATIENT
Start: 2020-11-08 | End: 2020-11-08

## 2020-11-08 RX ORDER — SODIUM CHLORIDE 9 MG/ML
3 INJECTION INTRAMUSCULAR; INTRAVENOUS; SUBCUTANEOUS EVERY 8 HOURS
Refills: 0 | Status: DISCONTINUED | OUTPATIENT
Start: 2020-11-08 | End: 2020-11-11

## 2020-11-08 RX ORDER — ERTAPENEM SODIUM 1 G/1
INJECTION, POWDER, LYOPHILIZED, FOR SOLUTION INTRAMUSCULAR; INTRAVENOUS
Refills: 0 | Status: DISCONTINUED | OUTPATIENT
Start: 2020-11-08 | End: 2020-11-09

## 2020-11-08 RX ORDER — IBUPROFEN 200 MG
1 TABLET ORAL
Qty: 0 | Refills: 0 | DISCHARGE
Start: 2020-11-08

## 2020-11-08 RX ORDER — ACETAMINOPHEN 500 MG
975 TABLET ORAL EVERY 6 HOURS
Refills: 0 | Status: DISCONTINUED | OUTPATIENT
Start: 2020-11-08 | End: 2020-11-10

## 2020-11-08 RX ORDER — HEPARIN SODIUM 5000 [USP'U]/ML
5000 INJECTION INTRAVENOUS; SUBCUTANEOUS EVERY 12 HOURS
Refills: 0 | Status: DISCONTINUED | OUTPATIENT
Start: 2020-11-08 | End: 2020-11-11

## 2020-11-08 RX ORDER — ERTAPENEM SODIUM 1 G/1
1000 INJECTION, POWDER, LYOPHILIZED, FOR SOLUTION INTRAMUSCULAR; INTRAVENOUS EVERY 24 HOURS
Refills: 0 | Status: DISCONTINUED | OUTPATIENT
Start: 2020-11-09 | End: 2020-11-09

## 2020-11-08 RX ORDER — IBUPROFEN 200 MG
600 TABLET ORAL EVERY 6 HOURS
Refills: 0 | Status: DISCONTINUED | OUTPATIENT
Start: 2020-11-08 | End: 2020-11-11

## 2020-11-08 RX ADMIN — Medication 600 MILLIGRAM(S): at 06:15

## 2020-11-08 RX ADMIN — SODIUM CHLORIDE 3 MILLILITER(S): 9 INJECTION INTRAMUSCULAR; INTRAVENOUS; SUBCUTANEOUS at 22:45

## 2020-11-08 RX ADMIN — Medication 975 MILLIGRAM(S): at 06:16

## 2020-11-08 RX ADMIN — Medication 975 MILLIGRAM(S): at 14:51

## 2020-11-08 RX ADMIN — ERTAPENEM SODIUM 120 MILLIGRAM(S): 1 INJECTION, POWDER, LYOPHILIZED, FOR SOLUTION INTRAMUSCULAR; INTRAVENOUS at 00:53

## 2020-11-08 RX ADMIN — SODIUM CHLORIDE 125 MILLILITER(S): 9 INJECTION, SOLUTION INTRAVENOUS at 09:26

## 2020-11-08 RX ADMIN — Medication 975 MILLIGRAM(S): at 20:07

## 2020-11-08 RX ADMIN — Medication 600 MILLIGRAM(S): at 17:16

## 2020-11-08 RX ADMIN — Medication 600 MILLIGRAM(S): at 12:05

## 2020-11-08 RX ADMIN — SODIUM CHLORIDE 125 MILLILITER(S): 9 INJECTION, SOLUTION INTRAVENOUS at 06:16

## 2020-11-08 RX ADMIN — Medication 600 MILLIGRAM(S): at 12:35

## 2020-11-08 RX ADMIN — SODIUM CHLORIDE 125 MILLILITER(S): 9 INJECTION, SOLUTION INTRAVENOUS at 00:52

## 2020-11-08 RX ADMIN — Medication 975 MILLIGRAM(S): at 15:20

## 2020-11-08 NOTE — PROGRESS NOTE ADULT - PROBLEM SELECTOR PLAN 1
Neuro: PO pain meds.   CV: Hemodynamically stable. AM CBC pending.   Pulm: Saturating well on room air, encourage oob/amb  GI: Continue regular diet  :  Voiding spontaneously   Heme: c/w SCDs for DVT ppx  FEN: LR@125.  replete electrolytes prn   ID: Afebrile, will monitor s/p abx  Endo: No active issues   Dispo: Continue routine post-op care. will monitor for fevers. Consider dc home when afebrile >24 hrs    Caesar PGY2

## 2020-11-08 NOTE — DISCHARGE NOTE PROVIDER - NSDCFUADDINST_GEN_ALL_CORE_FT
Return to your regular way of eating.  Resume normal activity as tolerated, but no heavy lifting or strenuous activity for 2 weeks.  No driving for next 2 weeks and/or while on narcotic pain medication.  Complete vaginal rest, no tampons, no douching, no tub bathing, no sexual activities for 2 weeks unless otherwise instructed by your doctor.  Call your doctor with any signs and symptoms of infection such as fever, chills, nausea or vomiting.  Call your doctor with redness or swelling at the incision site, fluid leakage or wound separation.  Call your doctor if you're unable to tolerate food or have difficulty urinating.  Call your doctor if you have pain that is not relieved by your prescribed medications.  Notify your doctor with any other concerns.  Follow up in GYN clinic at [facility]. See above for address.   Return to your regular way of eating.  Resume normal activity as tolerated, but no heavy lifting or strenuous activity for 2 weeks.  No driving for next 2 weeks and/or while on narcotic pain medication.  Complete vaginal rest, no tampons, no douching, no tub bathing, no sexual activities for 2 weeks unless otherwise instructed by your doctor.  Call your doctor with any signs and symptoms of infection such as fever, chills, nausea or vomiting.  Call your doctor with redness or swelling at the incision site, fluid leakage or wound separation.  Call your doctor if you're unable to tolerate food or have difficulty urinating.  Call your doctor if you have pain that is not relieved by your prescribed medications.  Notify your doctor with any other concerns.  Follow up in GYN clinic at [facility]. See above for address.    Complete a 14 day course of intravenous antibiotics (through November 21, 2020).

## 2020-11-08 NOTE — BRIEF OPERATIVE NOTE - OPERATION/FINDINGS
EUA: 2cm dilated, anteverted uterus.   Bimanual exam s/p DVC, uterus firm EUA: 2cm dilated, anteverted uterus.   Suction curettage with #10 suction curette followed by sharp curettage.  Moderate amount of POC's to Path  EBL 50cc.  Bimanual exam s/p DVC, uterus firm

## 2020-11-08 NOTE — DISCHARGE NOTE PROVIDER - NSDCMRMEDTOKEN_GEN_ALL_CORE_FT
acetaminophen 325 mg oral tablet: 3 tab(s) orally every 6 hours  ibuprofen 600 mg oral tablet: 1 tab(s) orally every 6 hours   acetaminophen 325 mg oral tablet: 3 tab(s) orally every 6 hours  cefTRIAXone 1 g intravenous injection: 2 gram(s) intravenously once a day MDD:2g  ibuprofen 600 mg oral tablet: 1 tab(s) orally every 6 hours

## 2020-11-08 NOTE — DISCHARGE NOTE PROVIDER - HOSPITAL COURSE
40y  s/p SAB on 11/3 admitted with vaginal bleeding and abdominal pain. TVUS consistent with retained products, found to be febrile and tachycardiac. Patient now s/p dilation and suction curettage for septic incomplete . Patient receive antibiotics.     Labs:  []   40y  s/p SAB on 11/3 admitted with vaginal bleeding and abdominal pain. TVUS consistent with retained products, found to be febrile and tachycardiac. Patient now s/p dilation and suction curettage for septic incomplete . Blood cultures positive for Group B Strep, Pt transitioned from Invanz to Ceftriaxone. ID consulted and recommended 14 days of IV antibiotics.    40y  s/p SAB on 11/3 admitted with vaginal bleeding and abdominal pain. TVUS consistent with retained products, found to be febrile and tachycardiac. Patient underwent a D&C for septic incomplete ; please see operative note for details. She was started on Invanz for septic AB. Blood cultures returned positive for Group B Strep. ID was consulted and she was transitioned to Ceftriaxone 2g IV qD with recommendations to complete a 14d course of IV abx. She received a midline on _____ and was discharged on ____.    40y  s/p SAB on 11/3 admitted with vaginal bleeding and abdominal pain. TVUS consistent with retained products, found to be febrile and tachycardiac. Patient underwent a D&C for septic incomplete ; please see operative note for details. She was started on Invanz for septic AB. Urine and blood cultures () returned positive for Group B Strep. ID was consulted and she was transitioned to Ceftriaxone 2g IV qD with recommendations to complete a 14d course of IV abx. She had repeat blood cultures drawn . She underwent a TTE 11/10 that showed  EF 61%, minimal mitral regurgitation; normal LV/RV; unable to exclude endocarditis. Findings were reviewed with ID; / no gross vegetations visualized and pt clinically stable, SAURAV was deferred. She received a midline 11/10 after prelim repeat blood cultures were negative. She remained afebrile x72hr and was cleared for discharge home on HD#4/POD#3.      40y  s/p SAB on 11/3 admitted with vaginal bleeding and abdominal pain. TVUS consistent with retained products, found to be febrile and tachycardiac. Patient underwent a D&C for septic incomplete ; please see operative note for details. She was started on Invanz for septic AB. Urine and blood cultures () returned positive for Group B Strep. ID was consulted and she was transitioned to Ceftriaxone 2g IV qD with recommendations to complete a 14d course of IV abx. She had repeat blood cultures drawn . She underwent a TTE 11/10 that showed  EF 61%, minimal mitral regurgitation; normal LV/RV; unable to exclude endocarditis. Findings were reviewed with ID; / no gross vegetations visualized and pt clinically stable, SAURAV was deferred. She received a midline 11/10 after prelim repeat blood cultures were negative. She remained afebrile x72hr and was cleared for discharge home on HD#5/POD#4.

## 2020-11-08 NOTE — PROGRESS NOTE ADULT - SUBJECTIVE AND OBJECTIVE BOX
R2 SUJATA Progress Note    POD#1   HD#2    Patient seen and examined at bedside. S/p DVC early this morning for retained POC from incomplete AB.   No acute complaints.  Pain well controlled.  Patient is ambulating and tolerating regular diet. Passing flatus. Voiding.   Denies CP, SOB, N/V, fevers, and chills.    Vital Signs Last 24 Hours  T(C): 36.7 (20 @ 06:14), Max: 38.4 (20 @ 17:20)  HR: 80 (20 @ 06:14) (63 - 119)  BP: 92/54 (20 @ 06:14) (92/54 - 127/69)  RR: 18 (20 @ 06:14) (16 - 28)  SpO2: 100% (20 @ 06:14) (96% - 100%)    I&O's Summary    2020 07:01  -  2020 07:00  --------------------------------------------------------  IN: 675 mL / OUT: 750 mL / NET: -75 mL        Physical Exam:  General: NAD  CV: RR, S1, S2,   Lungs: CTA b/l, good air flow b/l   Abdomen: Soft, mildly-tender to palpation diffusely, softly distended, normoactive bowel sounds  : scant bleeding on pad  Ext: No pain or swelling     Labs:                        9.2    10.81 )-----------( 310      ( 2020 05:10 )             28.9   baso 0.1    eos 0.0    imm gran 0.5    lymph 7.4    mono 2.5    poly 89.5                         10.6   11.41 )-----------( 371      ( 2020 12:10 )             33.6   baso 0.1    eos 0.2    imm gran 0.5    lymph 15.9   mono 5.4    poly 77.9       MEDICATIONS  (STANDING):  acetaminophen   Tablet .. 975 milliGRAM(s) Oral every 6 hours  ertapenem  IVPB      ibuprofen  Tablet. 600 milliGRAM(s) Oral every 6 hours  influenza   Vaccine 0.5 milliLiter(s) IntraMuscular once  lactated ringers. 1000 milliLiter(s) (125 mL/Hr) IV Continuous <Continuous>    MEDICATIONS  (PRN):      A/P: 40y POD#1 s/p DVC for septic incomplete . S/p Gent/Clindax1 preop and s/pInvanz x1 postop. Afebrile.  Patient is stable and doing well.      Neuro: PO pain meds.   CV: Hemodynamically stable. AM CBC pending.   Pulm: Saturating well on room air, encourage oob/amb  GI: Continue regular diet  :  Voiding spontaneously   Heme: c/w SCDs for DVT ppx  FEN: LR@125.  replete electrolytes prn   ID: Afebrile  Endo: No active issues   Dispo: Continue routine post-op care    Caesar PGY2 R2 SUJATA Progress Note    POD#1   HD#2    Patient seen and examined at bedside. S/p DVC early this morning for retained POC from incomplete AB.   No acute complaints.  Pain well controlled.  Patient is ambulating and tolerating regular diet. Passing flatus. Voiding.   Denies CP, SOB, N/V, fevers, and chills.    Vital Signs Last 24 Hours  T(C): 36.7 (11-08-20 @ 06:14), Max: 38.4 (11-07-20 @ 17:20)  HR: 80 (11-08-20 @ 06:14) (63 - 119)  BP: 92/54 (11-08-20 @ 06:14) (92/54 - 127/69)  RR: 18 (11-08-20 @ 06:14) (16 - 28)  SpO2: 100% (11-08-20 @ 06:14) (96% - 100%)    I&O's Summary    07 Nov 2020 07:01  -  08 Nov 2020 07:00  --------------------------------------------------------  IN: 675 mL / OUT: 750 mL / NET: -75 mL        Physical Exam:  General: NAD  CV: RR, S1, S2,   Lungs: CTA b/l, good air flow b/l   Abdomen: Soft, mildly-tender to palpation diffusely, softly distended, normoactive bowel sounds  : scant bleeding on pad  Ext: No pain or swelling     Labs:                        9.2    10.81 )-----------( 310      ( 08 Nov 2020 05:10 )             28.9   baso 0.1    eos 0.0    imm gran 0.5    lymph 7.4    mono 2.5    poly 89.5                         10.6   11.41 )-----------( 371      ( 07 Nov 2020 12:10 )             33.6   baso 0.1    eos 0.2    imm gran 0.5    lymph 15.9   mono 5.4    poly 77.9       MEDICATIONS  (STANDING):  acetaminophen   Tablet .. 975 milliGRAM(s) Oral every 6 hours  ertapenem  IVPB      ibuprofen  Tablet. 600 milliGRAM(s) Oral every 6 hours  influenza   Vaccine 0.5 milliLiter(s) IntraMuscular once  lactated ringers. 1000 milliLiter(s) (125 mL/Hr) IV Continuous <Continuous>    MEDICATIONS  (PRN):

## 2020-11-08 NOTE — DISCHARGE NOTE PROVIDER - CARE PROVIDER_API CALL
XIMENA ERNST [facility],   [phone] [place] [address]  [place] [address]  Phone: [phone]  Fax: (   )    -  Follow Up Time:

## 2020-11-08 NOTE — DISCHARGE NOTE PROVIDER - NSDCCPTREATMENT_GEN_ALL_CORE_FT
PRINCIPAL PROCEDURE  Procedure: Dilation and curettage, uterus  Findings and Treatment: suction

## 2020-11-08 NOTE — CHART NOTE - NSCHARTNOTEFT_GEN_A_CORE
Pt seen at bedside with Dr. Devine.     Pt with prelim BCx revealing Gram Positive Cocci.   Will monitor pt on Invanz. Pt tolerating regular diet, will SLIV.     Repeat AM CBC w/diff in AM. Follow up UCx, BCx final. Pt afebrile.     Caesar PGY2

## 2020-11-08 NOTE — DISCHARGE NOTE PROVIDER - NSDCCPCAREPLAN_GEN_ALL_CORE_FT
PRINCIPAL DISCHARGE DIAGNOSIS  Diagnosis:  with septicemia  Assessment and Plan of Treatment:       SECONDARY DISCHARGE DIAGNOSES  Diagnosis: Retained products of conception after miscarriage  Assessment and Plan of Treatment:      PRINCIPAL DISCHARGE DIAGNOSIS  Diagnosis:  with septicemia  Assessment and Plan of Treatment: Regular diet as tolerated, regular activity as tolerated, no heavy lifting for first two weeks.  Nothing per vagina: no intercourse, tampons or douching.  Call your provider if you experience fevers, chills, worsening abdominal pain, inability to urinate or worsening vaginal bleeding.  Complete a total 14 day course of intravenous antibiotics (through 2020).   Follow up with your provider in 2 weeks.      SECONDARY DISCHARGE DIAGNOSES  Diagnosis: Retained products of conception after miscarriage  Assessment and Plan of Treatment:

## 2020-11-08 NOTE — DISCHARGE NOTE PROVIDER - PROVIDER TOKENS
FREE:[LAST:[XIMENA ERNST [facility]],PHONE:[phone]],FAX:[(   )    -],ADDRESS:[[phone] [place] [address]]]

## 2020-11-09 DIAGNOSIS — O03.87 SEPSIS FOLLOWING COMPLETE OR UNSPECIFIED SPONTANEOUS ABORTION: ICD-10-CM

## 2020-11-09 LAB
BASOPHILS # BLD AUTO: 0.01 K/UL — SIGNIFICANT CHANGE UP (ref 0–0.2)
BASOPHILS NFR BLD AUTO: 0.1 % — SIGNIFICANT CHANGE UP (ref 0–2)
CULTURE RESULTS: SIGNIFICANT CHANGE UP
EOSINOPHIL # BLD AUTO: 0.13 K/UL — SIGNIFICANT CHANGE UP (ref 0–0.5)
EOSINOPHIL NFR BLD AUTO: 1.4 % — SIGNIFICANT CHANGE UP (ref 0–6)
HCT VFR BLD CALC: 26.7 % — LOW (ref 34.5–45)
HGB BLD-MCNC: 8.3 G/DL — LOW (ref 11.5–15.5)
IMM GRANULOCYTES NFR BLD AUTO: 0.3 % — SIGNIFICANT CHANGE UP (ref 0–1.5)
LYMPHOCYTES # BLD AUTO: 3.6 K/UL — HIGH (ref 1–3.3)
LYMPHOCYTES # BLD AUTO: 38.6 % — SIGNIFICANT CHANGE UP (ref 13–44)
MCHC RBC-ENTMCNC: 28 PG — SIGNIFICANT CHANGE UP (ref 27–34)
MCHC RBC-ENTMCNC: 31.1 % — LOW (ref 32–36)
MCV RBC AUTO: 90.2 FL — SIGNIFICANT CHANGE UP (ref 80–100)
MONOCYTES # BLD AUTO: 0.67 K/UL — SIGNIFICANT CHANGE UP (ref 0–0.9)
MONOCYTES NFR BLD AUTO: 7.2 % — SIGNIFICANT CHANGE UP (ref 2–14)
NEUTROPHILS # BLD AUTO: 4.88 K/UL — SIGNIFICANT CHANGE UP (ref 1.8–7.4)
NEUTROPHILS NFR BLD AUTO: 52.4 % — SIGNIFICANT CHANGE UP (ref 43–77)
NRBC # FLD: 0 K/UL — SIGNIFICANT CHANGE UP (ref 0–0)
PLATELET # BLD AUTO: 330 K/UL — SIGNIFICANT CHANGE UP (ref 150–400)
PMV BLD: 10.5 FL — SIGNIFICANT CHANGE UP (ref 7–13)
RBC # BLD: 2.96 M/UL — LOW (ref 3.8–5.2)
RBC # FLD: 16.7 % — HIGH (ref 10.3–14.5)
SPECIMEN SOURCE: SIGNIFICANT CHANGE UP
WBC # BLD: 9.32 K/UL — SIGNIFICANT CHANGE UP (ref 3.8–10.5)
WBC # FLD AUTO: 9.32 K/UL — SIGNIFICANT CHANGE UP (ref 3.8–10.5)

## 2020-11-09 PROCEDURE — 99223 1ST HOSP IP/OBS HIGH 75: CPT

## 2020-11-09 PROCEDURE — 99222 1ST HOSP IP/OBS MODERATE 55: CPT | Mod: GC

## 2020-11-09 RX ORDER — ALBUTEROL 90 UG/1
2 AEROSOL, METERED ORAL EVERY 6 HOURS
Refills: 0 | Status: DISCONTINUED | OUTPATIENT
Start: 2020-11-09 | End: 2020-11-11

## 2020-11-09 RX ORDER — ALPRAZOLAM 0.25 MG
0.25 TABLET ORAL EVERY 8 HOURS
Refills: 0 | Status: DISCONTINUED | OUTPATIENT
Start: 2020-11-09 | End: 2020-11-11

## 2020-11-09 RX ORDER — CEFTRIAXONE 500 MG/1
2000 INJECTION, POWDER, FOR SOLUTION INTRAMUSCULAR; INTRAVENOUS EVERY 24 HOURS
Refills: 0 | Status: DISCONTINUED | OUTPATIENT
Start: 2020-11-09 | End: 2020-11-11

## 2020-11-09 RX ORDER — NICOTINE POLACRILEX 2 MG
1 GUM BUCCAL DAILY
Refills: 0 | Status: DISCONTINUED | OUTPATIENT
Start: 2020-11-09 | End: 2020-11-11

## 2020-11-09 RX ORDER — METOCLOPRAMIDE HCL 10 MG
10 TABLET ORAL ONCE
Refills: 0 | Status: COMPLETED | OUTPATIENT
Start: 2020-11-09 | End: 2020-11-09

## 2020-11-09 RX ORDER — DIPHENHYDRAMINE HCL 50 MG
25 CAPSULE ORAL ONCE
Refills: 0 | Status: COMPLETED | OUTPATIENT
Start: 2020-11-09 | End: 2020-11-09

## 2020-11-09 RX ADMIN — Medication 10 MILLIGRAM(S): at 13:59

## 2020-11-09 RX ADMIN — Medication 975 MILLIGRAM(S): at 23:58

## 2020-11-09 RX ADMIN — Medication 975 MILLIGRAM(S): at 13:59

## 2020-11-09 RX ADMIN — Medication 600 MILLIGRAM(S): at 06:07

## 2020-11-09 RX ADMIN — Medication 975 MILLIGRAM(S): at 08:29

## 2020-11-09 RX ADMIN — Medication 600 MILLIGRAM(S): at 00:00

## 2020-11-09 RX ADMIN — Medication 25 MILLIGRAM(S): at 13:59

## 2020-11-09 RX ADMIN — SODIUM CHLORIDE 3 MILLILITER(S): 9 INJECTION INTRAMUSCULAR; INTRAVENOUS; SUBCUTANEOUS at 05:47

## 2020-11-09 RX ADMIN — HEPARIN SODIUM 5000 UNIT(S): 5000 INJECTION INTRAVENOUS; SUBCUTANEOUS at 05:40

## 2020-11-09 RX ADMIN — Medication 975 MILLIGRAM(S): at 02:10

## 2020-11-09 RX ADMIN — ERTAPENEM SODIUM 120 MILLIGRAM(S): 1 INJECTION, POWDER, LYOPHILIZED, FOR SOLUTION INTRAMUSCULAR; INTRAVENOUS at 00:00

## 2020-11-09 RX ADMIN — Medication 600 MILLIGRAM(S): at 17:03

## 2020-11-09 RX ADMIN — SODIUM CHLORIDE 3 MILLILITER(S): 9 INJECTION INTRAMUSCULAR; INTRAVENOUS; SUBCUTANEOUS at 22:01

## 2020-11-09 RX ADMIN — SODIUM CHLORIDE 3 MILLILITER(S): 9 INJECTION INTRAMUSCULAR; INTRAVENOUS; SUBCUTANEOUS at 13:58

## 2020-11-09 RX ADMIN — HEPARIN SODIUM 5000 UNIT(S): 5000 INJECTION INTRAVENOUS; SUBCUTANEOUS at 17:03

## 2020-11-09 RX ADMIN — Medication 600 MILLIGRAM(S): at 05:40

## 2020-11-09 RX ADMIN — Medication 600 MILLIGRAM(S): at 11:37

## 2020-11-09 RX ADMIN — Medication 600 MILLIGRAM(S): at 23:59

## 2020-11-09 RX ADMIN — CEFTRIAXONE 100 MILLIGRAM(S): 500 INJECTION, POWDER, FOR SOLUTION INTRAMUSCULAR; INTRAVENOUS at 17:03

## 2020-11-09 NOTE — CONSULT NOTE ADULT - ATTENDING COMMENTS
40 year old female with septic  with retained products s/p d and c    Course complicated by group B strep bacteremia.  I suspect this is transient related to septic .   But group B strep can be associated with more dissemintated infection.      I would check repeat blood cultures  Check an echo    Plan for 14 day course of antibiotics.  Can change to Ceftriaxone 2 gram iv daily through .  I think IV antibiotics are safer than po with group b strep bacteremia  Weekly CBC CMP fax [phone]

## 2020-11-09 NOTE — CONSULT NOTE ADULT - ASSESSMENT
40 yr old woman PMH anemia, asthma, anxiety, recently found to be pregnant by PCP which was complicated and now admitted to GYN service for incomplete septic  with Group B strep bacteremia. S/p D&C .    # anemia  multifactorial  has history of anemia and with blood loss given recent miscarriage and D&C  check iron panel, ferritin, b12, LDH, haptoglobin, reticulocyte count    # incomplete septic  c/b Group B strep bacteremia  sepsis met on admission  s/p D&C  Bcx with Group B strep in anaerobic bottle, currently on Invanz  Awaiting sensitivities   Could consider switching Abx to ampicillin if ok with ID  f/u repeat cultures from   pain control per primary team    # asthma  presently not active  albuterol HFA prn    # polysubstance abuse: active smoker and ETOH use  - advised smoking cessation  - apply nicotine patch if patient agreeable  - currently not showing signs of ETOH withdrawal, will continue to monitor    # anxiety  reports feeling some anxiety now  can consider low dose xanax 0.25mg TID PRN  outpatient psych referral    # headache  continue tylenol and/or motrin PRN  CTH if signs/symptoms of neurological focal deficits, presently none evident

## 2020-11-09 NOTE — CONSULT NOTE ADULT - SUBJECTIVE AND OBJECTIVE BOX
Gisele Lawson  Internal Medicine PGY3    Patient is a 40y old  Female who presents with a chief complaint of septic incomplete  (2020 06:48)    HPI:  Patient is a 40 y.o F w/ PMH asthma who presented to the hospital for worsening headache and fatigue. She stated that she was found to pregnant two weeks ago by her primary care physician but had heavy bleeding and cramping on . In the ED, she received a TVUS which revealed retained POC in the setting of meeting sepsis criteria (Tmax 101 and leukocytosis of 11.41). She was admitted to OB for management of incomplete septic .     Patient underwent a D&C on . Her blood culture grew group B strep for which she was started on invanz. At encounter, patient admits to intermittent, sharp, right sided headache. She also admits to mild lower abdominal pain and has been experiencing increased frequency of urination. Denies dysuria, skin rashes, or joint pains. She has mild pink spotting on her pads after her D&C, denies any clots.     ObHx NSVDx4 , , ,   GynHx Missed Ab w/ D+Cx1  PMHx Asthma  PSurgHx D+C as above   Meds Albuterol PRN  All NKDA, latex (2020 19:41)       prior hospital charts reviewed [ x]  primary team notes reviewed [ x]  other consultant notes reviewed [x ]    PAST MEDICAL & SURGICAL HISTORY:  No pertinent past medical history    H/O excision of ganglion cyst  Right wrist        Allergies  latex (Rash)  No Known Drug Allergies    ANTIMICROBIALS (past 90 days)  MEDICATIONS  (STANDING):    clindamycin IVPB   100 mL/Hr IV Intermittent (20 @ 17:58)    ertapenem  IVPB   120 mL/Hr IV Intermittent (20 @ 00:53)    ertapenem  IVPB   120 mL/Hr IV Intermittent (20 @ 00:00)    gentamicin   IVPB   500 mL/Hr IV Intermittent (20 @ 19:30)        ertapenem  IVPB    ertapenem  IVPB 1000 every 24 hours    OTHER MEDS: MEDICATIONS  (STANDING):  acetaminophen   Tablet .. 975 every 6 hours  heparin   Injectable 5000 every 12 hours  ibuprofen  Tablet. 600 every 6 hours  influenza   Vaccine 0.5 once    SOCIAL HISTORY:       FAMILY HISTORY:  No pertinent family history in first degree relatives      REVIEW OF SYSTEMS  [  ] ROS unobtainable because:    [x ] All other systems negative except as noted below:	    Constitutional:  [ ] fever [ ] chills  [ ] weight loss  [ ] weakness  Skin:  [ ] rash [ ] phlebitis	  Eyes: [ ] icterus [ ] pain  [ ] discharge	  ENMT: [ ] sore throat  [ ] thrush [ ] ulcers [ ] exudates  Respiratory: [ ] dyspnea [ ] hemoptysis [ ] cough [ ] sputum	  Cardiovascular:  [ ] chest pain [ ] palpitations [ ] edema	  Gastrointestinal:  [ ] nausea [ ] vomiting [ ] diarrhea [ ] constipation [ ] pain	  Genitourinary:  [ ] dysuria [ ] frequency [ ] hematuria [ ] discharge [ ] flank pain  [ ] incontinence  Musculoskeletal:  [ ] myalgias [ ] arthralgias [ ] arthritis  [ ] back pain  Neurological:  [ ] headache [ ] seizures  [ ] confusion/altered mental status  Psychiatric:  [ ] anxiety [ ] depression	  Hematology/Lymphatics:  [ ] lymphadenopathy  Endocrine:  [ ] adrenal [ ] thyroid  Allergic/Immunologic:	 [ ] transplant [ ] seasonal    Vital Signs Last 24 Hrs  T(F): 98.4 (20 @ 09:28), Max: 101.1 (20 @ 17:20)  Vital Signs Last 24 Hrs  HR: 80 (20 @ 09:28) (72 - 93)  BP: 103/59 (20 @ 09:28) (101/64 - 116/61)  RR: 17 (20 @ 09:28)  SpO2: 98% (20 @ 09:28) (97% - 100%)  Wt(kg): --    PHYSICAL EXAM:  Constitutional: non-toxic, no distress  HEAD/EYES: anicteric, no conjunctival injection  ENT:  supple, no thrush  Cardiovascular:   normal S1, S2, no murmur, no edema  Respiratory:  clear BS bilaterally, no wheezes, no rales  GI: +surgical scars noted, soft, mild tendernesss to palpation of lower abdomen.   :  no cary, no CVA tenderness  Musculoskeletal:  no synovitis, normal ROM  Neurologic: awake and alert, normal strength, no focal findings  Skin:  no rash, no erythema, no phlebitis  Heme/Onc: no lymphadenopathy   Psychiatric:  awake, alert, appropriate mood                            8.3    9.32  )-----------( 330      ( 2020 05:15 )             26.7   11-07    135  |  102  |  7   ----------------------------<  91  4.0   |  22  |  0.58    Ca    8.8      2020 12:10    TPro  6.9  /  Alb  3.9  /  TBili  0.2  /  DBili  x   /  AST  11  /  ALT  6   /  AlkPhos  71  11-07    Urinalysis Basic - ( 2020 17:20 )    Color: LIGHT PINK / Appearance: Lt TURBID / S.025 / pH: 6.5  Gluc: NEGATIVE / Ketone: MODERATE  / Bili: NEGATIVE / Urobili: NORMAL   Blood: LARGE / Protein: 50 / Nitrite: NEGATIVE   Leuk Esterase: LARGE / RBC: >50 / WBC >50   Sq Epi: FEW / Non Sq Epi: x / Bacteria: MODERATE    MICROBIOLOGY:  Culture - Blood (collected 2020 20:45)  Source: .Blood Blood-Venous  Gram Stain (2020 09:14):    Growth in anaerobic bottle: Gram Positive Cocci in Pairs and Chains  Preliminary Report (2020 10:34):    Growth in anaerobic bottle: Streptococcus agalactiae (Group B)    Susceptibility to follow.    "Due to technical problems, Proteus sp. will Not be reported as part of    the BCID panel until further notice"    ***Blood Panel PCR results on this specimen are available    approximately 3 hours after the Gram stain result.***    Gram stain, PCR, and/or culture results may not always    correspond due to difference in methodologies.    ************************************************************    This PCR assay was performed using [employer].    The following targets are tested for: Enterococcus,    vancomycin resistant enterococci, Listeria monocytogenes,    coagulase negative staphylococci, S. aureus,    methicillin resistant S. aureus, Streptococcus agalactiae    (Group B), S. pneumoniae, S. pyogenes (Group A),    Acinetobacter baumannii, Enterobacter cloacae, E. coli,    Klebsiella oxytoca, K. pneumoniae, Proteus sp.,    Serratia marcescens, Haemophilus influenzae,    Neisseria meningitidis, Pseudomonas aeruginosa, Candida    albicans, C. glabrata, C krusei, C parapsilosis,    C. tropicalis and the KPC resistance gene.  Organism: Blood Culture PCR (2020 11:06)  Organism: Blood Culture PCR (2020 11:06)      -  Streptococcus agalactiae (Group B): Detec      Method Type: PCR    Culture - Blood (collected 2020 20:45)  Source: .Blood Blood-Peripheral  Preliminary Report (2020 21:01):    No growth to date.                  RADIOLOGY:  imaging below personally reviewed and agree with findings

## 2020-11-09 NOTE — PROGRESS NOTE ADULT - PROBLEM SELECTOR PLAN 1
Neuro: PO pain meds   CV: Hemodynamically stable  Pulm: Saturating well on room air, encourage oob/amb  GI: Continue regular diet  : voiding spontaneously   Heme: c/w SCDs for DVT ppx  ID: Repeat BCx today. Consult ID today regarding antibiotic course. Afebrile  Endo: No active issues   Dispo: Continue routine post-op care. Discuss with ID when to transition to PO antibiotics     Marine Cordova, PGY1 Neuro: PO pain meds   CV: Hemodynamically stable  Pulm: Saturating well on room air, encourage oob/amb  GI: Continue regular diet  : voiding spontaneously   Heme: c/w Heparin and SCDs for DVT ppx  ID: Repeat BCx today. Consult ID today regarding antibiotic course. Afebrile  Endo: No active issues   Dispo: Continue routine post-op care. Discuss with ID when to transition to PO antibiotics     Marine Cordova PGY1 Neuro: PO pain meds   CV: Hemodynamically stable  Pulm: Saturating well on room air, encourage oob/amb  GI: Continue regular diet  : voiding spontaneously   Heme: c/w Heparin and SCDs for DVT ppx  ID: WBC: 10->9. Continue Invanz. Repeat BCx today. Consult ID today regarding antibiotic course. Afebrile overnight  Endo: No active issues   Dispo: Continue routine post-op care. Discuss with ID when to transition to PO antibiotics     Marine Cordova, PGY1

## 2020-11-09 NOTE — CONSULT NOTE ADULT - ASSESSMENT
Patient is a 40 y.o F w/ PMH asthma who was admitted for incomplete septic  s/p D&C, found to have group B strep bacteremia     #Group B strep bacteremia  -BCX + strep agalactia (group B)  -invanz  -   -Can consider de-escalating to ceftriaxone 2 g daily   -f/u repeat BCX  -Consider TTE to r/o endocarditis     #Incomplete septic    -s/p D&C  -found to meet sepsis criteria at admission (Tmax 101, leukocytosis 11.41)   -currently afebrile, leukocytosis down trended   -c/w invanz    **THIS NOTE IS INCOMPLETE, HAVE NOT DISCUSSED WITH ATTTENDING** Patient is a 40 y.o F w/ PMH asthma who was admitted for incomplete septic  s/p D&C, found to have group B strep bacteremia     #Group B strep bacteremia  -BCX + strep agalactia (group B), likely 2/2 translocation from septic   -invanz  -   -Please start ceftriaxone 2 g daily; will need 14 day course  -f/u repeat BCX  -Please obtain TTE to r/o endocarditis     #Incomplete septic    -s/p D&C  -found to meet sepsis criteria at admission (Tmax 101, leukocytosis 11.41)   -currently afebrile, leukocytosis down trended   -see plan as above    D/W Dr. Melgoza

## 2020-11-09 NOTE — PROGRESS NOTE ADULT - SUBJECTIVE AND OBJECTIVE BOX
R1 SUJATA Progress Note  POD#2   HD#3    Patient seen and examined at bedside. Pt is stating that her stomach feels sore and she is experiencing slight nausea. She has been having light vaginal spotting. She denies emesis and has been tolerating a PO diet.  No acute events overnight. No acute complaints.  Pain well controlled with tylenol and motrin. Patient is ambulating.  Patient is passing flatus, and had one episode of loose stools after starting her course of antibiotics.    Patient is voiding spontaneously.  Denies dysuria, CP, SOB, N/V, fevers, and chills.    Vital Signs Last 24 Hours  T(C): 36.5 (11-09-20 @ 05:37), Max: 36.8 (11-08-20 @ 09:24)  HR: 72 (11-09-20 @ 05:37) (72 - 93)  BP: 101/64 (11-09-20 @ 05:37) (101/64 - 116/61)  RR: 17 (11-09-20 @ 05:37) (17 - 18)  SpO2: 100% (11-09-20 @ 05:37) (97% - 100%)    I&O's Summary    07 Nov 2020 07:01  -  08 Nov 2020 07:00  --------------------------------------------------------  IN: 1175 mL / OUT: 1050 mL / NET: 125 mL    08 Nov 2020 07:01  -  09 Nov 2020 06:48  --------------------------------------------------------  IN: 500 mL / OUT: 1400 mL / NET: -900 mL        Physical Exam:  General: NAD  CV: RR, S1, S2, no M/R/G  Lungs: CTA b/l, good air flow b/l   Abdomen: Soft, appropriately tender, normoactive bowel sounds  : spotting on pad  Ext: No pain or swelling     Labs:                        8.8    9.25  )-----------( 306      ( 08 Nov 2020 13:02 )             27.9   baso x      eos x      imm gran x      lymph x      mono x      poly x                            9.2    10.81 )-----------( 310      ( 08 Nov 2020 05:10 )             28.9   baso 0.1    eos 0.0    imm gran 0.5    lymph 7.4    mono 2.5    poly 89.5                         10.6   11.41 )-----------( 371      ( 07 Nov 2020 12:10 )             33.6   baso 0.1    eos 0.2    imm gran 0.5    lymph 15.9   mono 5.4    poly 77.9       MEDICATIONS  (STANDING):  acetaminophen   Tablet .. 975 milliGRAM(s) Oral every 6 hours  ertapenem  IVPB      ertapenem  IVPB 1000 milliGRAM(s) IV Intermittent every 24 hours  heparin   Injectable 5000 Unit(s) SubCutaneous every 12 hours  ibuprofen  Tablet. 600 milliGRAM(s) Oral every 6 hours  influenza   Vaccine 0.5 milliLiter(s) IntraMuscular once  sodium chloride 0.9% lock flush 3 milliLiter(s) IV Push every 8 hours    MEDICATIONS  (PRN):           R1 SUJATA Progress Note  POD#2   HD#3    Patient seen and examined at bedside. Pt is stating that her stomach feels sore and she is experiencing slight nausea. She has been having light vaginal spotting. She denies emesis and has been tolerating a PO diet.  No acute events overnight. No acute complaints.  Pain well controlled with tylenol and motrin. Patient is ambulating.  Patient is passing flatus, and had one episode of loose stools after starting her course of antibiotics. Pt has been coughing up slight phlegm since surgery.   Patient is voiding spontaneously.  Denies dysuria, CP, SOB, N/V, fevers, and chills.    Vital Signs Last 24 Hours  T(C): 36.5 (11-09-20 @ 05:37), Max: 36.8 (11-08-20 @ 09:24)  HR: 72 (11-09-20 @ 05:37) (72 - 93)  BP: 101/64 (11-09-20 @ 05:37) (101/64 - 116/61)  RR: 17 (11-09-20 @ 05:37) (17 - 18)  SpO2: 100% (11-09-20 @ 05:37) (97% - 100%)    I&O's Summary    07 Nov 2020 07:01  -  08 Nov 2020 07:00  --------------------------------------------------------  IN: 1175 mL / OUT: 1050 mL / NET: 125 mL    08 Nov 2020 07:01  -  09 Nov 2020 06:48  --------------------------------------------------------  IN: 500 mL / OUT: 1400 mL / NET: -900 mL        Physical Exam:  General: NAD  CV: RR, S1, S2, no M/R/G  Lungs: CTA b/l, good air flow b/l   Abdomen: Soft, appropriately tender, normoactive bowel sounds  : spotting on pad  Ext: No pain or swelling     Labs:                        8.8    9.25  )-----------( 306      ( 08 Nov 2020 13:02 )             27.9   baso x      eos x      imm gran x      lymph x      mono x      poly x                            9.2    10.81 )-----------( 310      ( 08 Nov 2020 05:10 )             28.9   baso 0.1    eos 0.0    imm gran 0.5    lymph 7.4    mono 2.5    poly 89.5                         10.6   11.41 )-----------( 371      ( 07 Nov 2020 12:10 )             33.6   baso 0.1    eos 0.2    imm gran 0.5    lymph 15.9   mono 5.4    poly 77.9       MEDICATIONS  (STANDING):  acetaminophen   Tablet .. 975 milliGRAM(s) Oral every 6 hours  ertapenem  IVPB      ertapenem  IVPB 1000 milliGRAM(s) IV Intermittent every 24 hours  heparin   Injectable 5000 Unit(s) SubCutaneous every 12 hours  ibuprofen  Tablet. 600 milliGRAM(s) Oral every 6 hours  influenza   Vaccine 0.5 milliLiter(s) IntraMuscular once  sodium chloride 0.9% lock flush 3 milliLiter(s) IV Push every 8 hours    MEDICATIONS  (PRN):           R1 SUJATA Progress Note  POD#2   HD#3    Patient seen and examined at bedside. Pt is stating that her stomach feels sore and she is experiencing slight nausea. She has been having light vaginal spotting. She denies emesis and has been tolerating a PO diet.  No acute events overnight. No acute complaints.  Pain well controlled with tylenol and motrin. Patient is ambulating.  Patient is passing flatus, and had one episode of loose stools after starting her course of antibiotics. Pt has been coughing up slight phlegm since surgery.   Patient is voiding spontaneously.  Denies dysuria, CP, SOB, N/V, fevers, and chills.    Vital Signs Last 24 Hours  T(C): 36.5 (11-09-20 @ 05:37), Max: 36.8 (11-08-20 @ 09:24)  HR: 72 (11-09-20 @ 05:37) (72 - 93)  BP: 101/64 (11-09-20 @ 05:37) (101/64 - 116/61)  RR: 17 (11-09-20 @ 05:37) (17 - 18)  SpO2: 100% (11-09-20 @ 05:37) (97% - 100%)    I&O's Summary    07 Nov 2020 07:01  -  08 Nov 2020 07:00  --------------------------------------------------------  IN: 1175 mL / OUT: 1050 mL / NET: 125 mL    08 Nov 2020 07:01  -  09 Nov 2020 06:48  --------------------------------------------------------  IN: 500 mL / OUT: 1400 mL / NET: -900 mL        Physical Exam:  General: NAD  CV: RR, S1, S2, no M/R/G  Lungs: CTA b/l, good air flow b/l   Abdomen: Soft, appropriately tender in the LLQ and RLQ, normoactive bowel sounds  : spotting on pad  Ext: No pain or swelling     Labs:                        8.8    9.25  )-----------( 306      ( 08 Nov 2020 13:02 )             27.9   baso x      eos x      imm gran x      lymph x      mono x      poly x                            9.2    10.81 )-----------( 310      ( 08 Nov 2020 05:10 )             28.9   baso 0.1    eos 0.0    imm gran 0.5    lymph 7.4    mono 2.5    poly 89.5                         10.6   11.41 )-----------( 371      ( 07 Nov 2020 12:10 )             33.6   baso 0.1    eos 0.2    imm gran 0.5    lymph 15.9   mono 5.4    poly 77.9       MEDICATIONS  (STANDING):  acetaminophen   Tablet .. 975 milliGRAM(s) Oral every 6 hours  ertapenem  IVPB      ertapenem  IVPB 1000 milliGRAM(s) IV Intermittent every 24 hours  heparin   Injectable 5000 Unit(s) SubCutaneous every 12 hours  ibuprofen  Tablet. 600 milliGRAM(s) Oral every 6 hours  influenza   Vaccine 0.5 milliLiter(s) IntraMuscular once  sodium chloride 0.9% lock flush 3 milliLiter(s) IV Push every 8 hours    MEDICATIONS  (PRN):

## 2020-11-09 NOTE — CONSULT NOTE ADULT - SUBJECTIVE AND OBJECTIVE BOX
CHIEF COMPLAINT: Patient is a 40y old  Female who presents with a chief complaint of septic incomplete  (2020 10:37)      HPI: HPI:  39yo  LMP  presenting to the ED with headache and fatigue in the setting of known miscarriage. Pt reports LMP . This was a planned pregnancy. On 11/3 she began having heavy bleeding with clots and cramping, which she identified as miscarriage. Admitted to GYN service for retained POC on TVUS and was febrile to 101F. Admitted for septic incomplete . Bcx growing Group B strep.    Medicine consulted for anemia, history of asthma, and known bacteremia.  Pt denies CP/SOB/palpitations. Reports she feels as if a truck is sitting on her head and having abdominal cramps. Reports that tylenol and motrin haven't helped. States she has only had very minimal vaginal spotting last 2 days. No chest pain, SOB, N/V. Had watery diarrhea twice after getting antibiotics. Reports she has some anxiety and used to take medication in past but stopped on own. She never required blood transfusions in past for her anemia. States she last used her inhaler 1 week ago and usually is controlled.    ObHx NSVDx4 , , ,   GynHx Missed Ab w/ D+Cx1  PMHx Asthma, anemia  PSurgHx D+C as above, appendectomy  Meds Albuterol PRN  All NKDA, latex (2020 19:41)      Pain Symptoms if applicable:             	                         none	   mild         moderate         severe  	                            0	    1-3	     4-6	         7-10  Pain:  Location:	  Modifying factors:	  Associated symptoms:	    Allergies    latex (Rash)  No Known Drug Allergies    Intolerances        HOME MEDICATIONS: [x] Reviewed    MEDICATIONS  (STANDING):  acetaminophen   Tablet .. 975 milliGRAM(s) Oral every 6 hours  ertapenem  IVPB      ertapenem  IVPB 1000 milliGRAM(s) IV Intermittent every 24 hours  heparin   Injectable 5000 Unit(s) SubCutaneous every 12 hours  ibuprofen  Tablet. 600 milliGRAM(s) Oral every 6 hours  influenza   Vaccine 0.5 milliLiter(s) IntraMuscular once  sodium chloride 0.9% lock flush 3 milliLiter(s) IV Push every 8 hours    MEDICATIONS  (PRN):      PAST MEDICAL & SURGICAL HISTORY:  anemia, asthma    H/O excision of ganglion cyst  Right wrist  appendectomy    [ X] Reviewed     SOCIAL HISTORY:  [x] Substance abuse: denies IVDU, denies marijuana and other illicit drugs  [x] Tobacco: current smoker, smokes about 2-3 cigs a day for past year  [x] Alcohol use: drinks about 2 cups of grey goose 1-2x a week (cup being defined as size of medium sized fruit cup)  Has 4 living children    FAMILY HISTORY:  father has diabetes sister with asthma  No known blood disorders in family    [x] No pertinent family history in first degree relatives     REVIEW OF SYSTEMS:    [x] All other ROS negative  [  ] Unable to obtain due to poor mental status    Vital Signs Last 24 Hrs  T(C): 36.9 (2020 09:28), Max: 36.9 (2020 09:28)  T(F): 98.4 (2020 09:28), Max: 98.4 (2020 09:28)  HR: 80 (2020 09:28) (72 - 93)  BP: 103/59 (2020 09:28) (101/64 - 116/61)  BP(mean): --  RR: 17 (2020 09:28) (17 - 18)  SpO2: 98% (2020 09:28) (97% - 100%)    PHYSICAL EXAM:    GENERAL: NAD, well-groomed, well-developed, on room air  HEAD:  Atraumatic, Normocephalic  EYES: EOMI, PERRLA, sclera clear  ENMT: Moist mucous membranes  NECK: Supple, No JVD  RESPIRATORY: Clear to auscultation bilaterally; No rales, rhonchi, wheezing  CARDIOVASCULAR: Regular rate and rhythm; No murmurs appreciated  GASTROINTESTINAL: Soft, Nontender, Nondistended; Bowel sounds present  GENITOURINARY: no cary  EXTREMITIES:  WWP, no edema  NERVOUS SYSTEM:  Alert & Oriented X3; Moving all 4 extremities; No gross deficits  PSYCH: calm cooperative  SKIN: No rashes or lesions    LABS:                        8.3    9.32  )-----------( 330      ( 2020 05:15 )             26.7     11-07    135  |  102  |  7   ----------------------------<  91  4.0   |  22  |  0.58    Ca    8.8      2020 12:10    TPro  6.9  /  Alb  3.9  /  TBili  0.2  /  DBili  x   /  AST  11  /  ALT  6   /  AlkPhos  71  11-07      Urinalysis Basic - ( 2020 17:20 )    Color: LIGHT PINK / Appearance: Lt TURBID / S.025 / pH: 6.5  Gluc: NEGATIVE / Ketone: MODERATE  / Bili: NEGATIVE / Urobili: NORMAL   Blood: LARGE / Protein: 50 / Nitrite: NEGATIVE   Leuk Esterase: LARGE / RBC: >50 / WBC >50   Sq Epi: FEW / Non Sq Epi: x / Bacteria: MODERATE      CAPILLARY BLOOD GLUCOSE          RADIOLOGY & ADDITIONAL STUDIES:    EKG:   Personally Reviewed:  [x] YES     Imaging:   Personally Reviewed:  [x] YES               [ ] Consultant(s) Notes Reviewed  [x] Care Discussed with Consultants/Other Providers:

## 2020-11-10 ENCOUNTER — TRANSCRIPTION ENCOUNTER (OUTPATIENT)
Age: 40
End: 2020-11-10

## 2020-11-10 LAB
-  CEFTRIAXONE: SIGNIFICANT CHANGE UP
-  CLINDAMYCIN: SIGNIFICANT CHANGE UP
-  LEVOFLOXACIN: SIGNIFICANT CHANGE UP
-  PENICILLIN: SIGNIFICANT CHANGE UP
-  VANCOMYCIN: SIGNIFICANT CHANGE UP
BASOPHILS # BLD AUTO: 0.01 K/UL — SIGNIFICANT CHANGE UP (ref 0–0.2)
BASOPHILS NFR BLD AUTO: 0.1 % — SIGNIFICANT CHANGE UP (ref 0–2)
CULTURE RESULTS: SIGNIFICANT CHANGE UP
EOSINOPHIL # BLD AUTO: 0.16 K/UL — SIGNIFICANT CHANGE UP (ref 0–0.5)
EOSINOPHIL NFR BLD AUTO: 2 % — SIGNIFICANT CHANGE UP (ref 0–6)
FERRITIN SERPL-MCNC: 32.52 NG/ML — SIGNIFICANT CHANGE UP (ref 15–150)
HAPTOGLOB SERPL-MCNC: 285 MG/DL — HIGH (ref 34–200)
HCT VFR BLD CALC: 27.9 % — LOW (ref 34.5–45)
HGB BLD-MCNC: 9.1 G/DL — LOW (ref 11.5–15.5)
IMM GRANULOCYTES NFR BLD AUTO: 0.2 % — SIGNIFICANT CHANGE UP (ref 0–1.5)
IRON SATN MFR SERPL: 13 UG/DL — LOW (ref 30–160)
IRON SATN MFR SERPL: 362 UG/DL — SIGNIFICANT CHANGE UP (ref 140–530)
LDH SERPL L TO P-CCNC: 123 U/L — LOW (ref 135–225)
LYMPHOCYTES # BLD AUTO: 3.99 K/UL — HIGH (ref 1–3.3)
LYMPHOCYTES # BLD AUTO: 48.7 % — HIGH (ref 13–44)
MCHC RBC-ENTMCNC: 28.8 PG — SIGNIFICANT CHANGE UP (ref 27–34)
MCHC RBC-ENTMCNC: 32.6 % — SIGNIFICANT CHANGE UP (ref 32–36)
MCV RBC AUTO: 88.3 FL — SIGNIFICANT CHANGE UP (ref 80–100)
METHOD TYPE: SIGNIFICANT CHANGE UP
METHOD TYPE: SIGNIFICANT CHANGE UP
MONOCYTES # BLD AUTO: 0.49 K/UL — SIGNIFICANT CHANGE UP (ref 0–0.9)
MONOCYTES NFR BLD AUTO: 6 % — SIGNIFICANT CHANGE UP (ref 2–14)
NEUTROPHILS # BLD AUTO: 3.52 K/UL — SIGNIFICANT CHANGE UP (ref 1.8–7.4)
NEUTROPHILS NFR BLD AUTO: 43 % — SIGNIFICANT CHANGE UP (ref 43–77)
NRBC # FLD: 0 K/UL — SIGNIFICANT CHANGE UP (ref 0–0)
ORGANISM # SPEC MICROSCOPIC CNT: SIGNIFICANT CHANGE UP
PLATELET # BLD AUTO: 373 K/UL — SIGNIFICANT CHANGE UP (ref 150–400)
PMV BLD: 10 FL — SIGNIFICANT CHANGE UP (ref 7–13)
RBC # BLD: 3.16 M/UL — LOW (ref 3.8–5.2)
RBC # FLD: 16.5 % — HIGH (ref 10.3–14.5)
RETICS #: 56 K/UL — SIGNIFICANT CHANGE UP (ref 25–125)
RETICS/RBC NFR: 1.8 % — SIGNIFICANT CHANGE UP (ref 0.5–2.5)
SPECIMEN SOURCE: SIGNIFICANT CHANGE UP
UIBC SERPL-MCNC: 348.5 UG/DL — SIGNIFICANT CHANGE UP (ref 110–370)
VIT B12 SERPL-MCNC: 402 PG/ML — SIGNIFICANT CHANGE UP (ref 200–900)
WBC # BLD: 8.19 K/UL — SIGNIFICANT CHANGE UP (ref 3.8–10.5)
WBC # FLD AUTO: 8.19 K/UL — SIGNIFICANT CHANGE UP (ref 3.8–10.5)

## 2020-11-10 PROCEDURE — 99232 SBSQ HOSP IP/OBS MODERATE 35: CPT

## 2020-11-10 PROCEDURE — 93306 TTE W/DOPPLER COMPLETE: CPT | Mod: 26

## 2020-11-10 RX ORDER — DIPHENHYDRAMINE HCL 50 MG
25 CAPSULE ORAL ONCE
Refills: 0 | Status: COMPLETED | OUTPATIENT
Start: 2020-11-10 | End: 2020-11-10

## 2020-11-10 RX ORDER — FERROUS SULFATE 325(65) MG
325 TABLET ORAL DAILY
Refills: 0 | Status: DISCONTINUED | OUTPATIENT
Start: 2020-11-10 | End: 2020-11-11

## 2020-11-10 RX ORDER — ACETAMINOPHEN 500 MG
650 TABLET ORAL ONCE
Refills: 0 | Status: COMPLETED | OUTPATIENT
Start: 2020-11-10 | End: 2020-11-10

## 2020-11-10 RX ORDER — ACETAMINOPHEN 500 MG
975 TABLET ORAL EVERY 6 HOURS
Refills: 0 | Status: DISCONTINUED | OUTPATIENT
Start: 2020-11-10 | End: 2020-11-11

## 2020-11-10 RX ORDER — ONDANSETRON 8 MG/1
4 TABLET, FILM COATED ORAL ONCE
Refills: 0 | Status: COMPLETED | OUTPATIENT
Start: 2020-11-10 | End: 2020-11-10

## 2020-11-10 RX ORDER — ONDANSETRON 8 MG/1
8 TABLET, FILM COATED ORAL ONCE
Refills: 0 | Status: DISCONTINUED | OUTPATIENT
Start: 2020-11-10 | End: 2020-11-10

## 2020-11-10 RX ORDER — METOCLOPRAMIDE HCL 10 MG
10 TABLET ORAL ONCE
Refills: 0 | Status: COMPLETED | OUTPATIENT
Start: 2020-11-10 | End: 2020-11-10

## 2020-11-10 RX ORDER — ASCORBIC ACID 60 MG
500 TABLET,CHEWABLE ORAL DAILY
Refills: 0 | Status: DISCONTINUED | OUTPATIENT
Start: 2020-11-10 | End: 2020-11-11

## 2020-11-10 RX ORDER — CEFTRIAXONE 500 MG/1
2 INJECTION, POWDER, FOR SOLUTION INTRAMUSCULAR; INTRAVENOUS
Qty: 22 | Refills: 0
Start: 2020-11-10 | End: 2020-11-20

## 2020-11-10 RX ORDER — SENNA PLUS 8.6 MG/1
2 TABLET ORAL AT BEDTIME
Refills: 0 | Status: DISCONTINUED | OUTPATIENT
Start: 2020-11-10 | End: 2020-11-11

## 2020-11-10 RX ADMIN — Medication 325 MILLIGRAM(S): at 11:23

## 2020-11-10 RX ADMIN — Medication 975 MILLIGRAM(S): at 06:10

## 2020-11-10 RX ADMIN — Medication 975 MILLIGRAM(S): at 00:28

## 2020-11-10 RX ADMIN — Medication 1 TABLET(S): at 22:00

## 2020-11-10 RX ADMIN — Medication 650 MILLIGRAM(S): at 22:00

## 2020-11-10 RX ADMIN — Medication 25 MILLIGRAM(S): at 15:11

## 2020-11-10 RX ADMIN — Medication 600 MILLIGRAM(S): at 11:23

## 2020-11-10 RX ADMIN — HEPARIN SODIUM 5000 UNIT(S): 5000 INJECTION INTRAVENOUS; SUBCUTANEOUS at 17:57

## 2020-11-10 RX ADMIN — Medication 600 MILLIGRAM(S): at 05:40

## 2020-11-10 RX ADMIN — SODIUM CHLORIDE 3 MILLILITER(S): 9 INJECTION INTRAMUSCULAR; INTRAVENOUS; SUBCUTANEOUS at 12:47

## 2020-11-10 RX ADMIN — SODIUM CHLORIDE 3 MILLILITER(S): 9 INJECTION INTRAMUSCULAR; INTRAVENOUS; SUBCUTANEOUS at 21:23

## 2020-11-10 RX ADMIN — Medication 10 MILLIGRAM(S): at 14:56

## 2020-11-10 RX ADMIN — Medication 650 MILLIGRAM(S): at 21:22

## 2020-11-10 RX ADMIN — Medication 600 MILLIGRAM(S): at 00:28

## 2020-11-10 RX ADMIN — Medication 1 TABLET(S): at 21:22

## 2020-11-10 RX ADMIN — Medication 975 MILLIGRAM(S): at 11:23

## 2020-11-10 RX ADMIN — SODIUM CHLORIDE 3 MILLILITER(S): 9 INJECTION INTRAMUSCULAR; INTRAVENOUS; SUBCUTANEOUS at 05:42

## 2020-11-10 RX ADMIN — SENNA PLUS 2 TABLET(S): 8.6 TABLET ORAL at 21:22

## 2020-11-10 RX ADMIN — Medication 500 MILLIGRAM(S): at 11:23

## 2020-11-10 RX ADMIN — Medication 1 PATCH: at 15:28

## 2020-11-10 RX ADMIN — HEPARIN SODIUM 5000 UNIT(S): 5000 INJECTION INTRAVENOUS; SUBCUTANEOUS at 05:40

## 2020-11-10 RX ADMIN — Medication 1 PATCH: at 19:26

## 2020-11-10 RX ADMIN — Medication 600 MILLIGRAM(S): at 06:10

## 2020-11-10 RX ADMIN — ONDANSETRON 4 MILLIGRAM(S): 8 TABLET, FILM COATED ORAL at 14:00

## 2020-11-10 RX ADMIN — CEFTRIAXONE 100 MILLIGRAM(S): 500 INJECTION, POWDER, FOR SOLUTION INTRAMUSCULAR; INTRAVENOUS at 17:57

## 2020-11-10 RX ADMIN — Medication 600 MILLIGRAM(S): at 17:58

## 2020-11-10 RX ADMIN — Medication 975 MILLIGRAM(S): at 05:40

## 2020-11-10 NOTE — CHART NOTE - NSCHARTNOTEFT_GEN_A_CORE
R1 Chart note    Pt seen at bedside due to 9/10 headache with blurry vision. pt states that it is throbbing in nature frontal and occipital. She had a cup of coffee that helped relieve the headache slightly. Tylenol and motrin haven't been helping with the headache, only relieving her abdominal pain. The patient states that she has barely eaten today since she doesn't like the food here. Her son is bringing her food that she likes better. She is ambulating and voiding spontaneously. She denies CP, SOB, vomiting and abdominal pain     Vital Signs Last 24 Hrs  T(C): 36.7 (10 Nov 2020 09:52), Max: 36.9 (2020 16:55)  T(F): 98.1 (10 Nov 2020 09:52), Max: 98.4 (2020 16:55)  HR: 71 (10 Nov 2020 09:52) (60 - 83)  BP: 102/60 (10 Nov 2020 09:52) (94/57 - 108/70)  BP(mean): --  RR: 18 (10 Nov 2020 09:52) (17 - 18)  SpO2: 99% (10 Nov 2020 09:52) (99% - 100%)    Gen NAD  CV RRR  Lungs CTABL  Abd soft nontender  Ext trace edema               9.1    8.19  )-----------( 373      ( 10 @ 06:00 )             27.9                8.3    9.32  )-----------( 330      (  @ 05:15 )             26.7                8.8    9.25  )-----------( 306      ( 08 @ 13:02 )             27.9     A/P  40y a/w septic incomplete  POD#3 s/p D+C. Pt BCx found to be Group B strep and is receiving ceftriaxone since . . ID recommends 2 weeks of ceftriaxone and TTE with no gross abnormalities. Patient is complaining of a headache, is otherwise stable and doing well.      - Pt agrees to use nicotine patch  - Benadryl and Reglan ordered  - Continue tylenol and motrin  - Encourage PO intake and ambulation  - anticipate discharge today if symptoms resolve    Marine Cordova, PGY1

## 2020-11-10 NOTE — DISCHARGE NOTE NURSING/CASE MANAGEMENT/SOCIAL WORK - PATIENT PORTAL LINK FT
You can access the FollowMyHealth Patient Portal offered by [facility] by registering at the following website: [email or web address]. By joining [facility]’s FollowMyHealth portal, you will also be able to view your health information using other applications (apps) compatible with our system.

## 2020-11-10 NOTE — PROGRESS NOTE ADULT - SUBJECTIVE AND OBJECTIVE BOX
R1 SUJATA Progress Note  POD#3   HD#4    Patient seen and examined at bedside.  Pt states that she has a headache this morning again. Yesterday her headache was relieved by Benadryl and Reglan. Pt also is a cigarette smoker and hasn't been smoking while in the hospital. No acute events overnight. No acute complaints.  Pain well controlled. Patient is ambulating and tolerating PO diet. She had 2 episodes of vomiting yesterday and received medication to relieve her nausea. Patient is passing flatus and bowel movements.  Patient is voiding spontaneously. Denies CP, SOB, N, fevers, and chills.    Vital Signs Last 24 Hours  T(C): 36.7 (11-10-20 @ 05:37), Max: 36.9 (11-09-20 @ 09:28)  HR: 65 (11-10-20 @ 05:37) (60 - 83)  BP: 108/70 (11-10-20 @ 05:37) (94/57 - 108/70)  RR: 17 (11-10-20 @ 05:37) (17 - 18)  SpO2: 100% (11-10-20 @ 05:37) (95% - 100%)    I&O's Summary    08 Nov 2020 07:01  -  09 Nov 2020 07:00  --------------------------------------------------------  IN: 500 mL / OUT: 1400 mL / NET: -900 mL    09 Nov 2020 07:01  -  10 Nov 2020 06:32  --------------------------------------------------------  IN: 1100 mL / OUT: 1100 mL / NET: 0 mL        Physical Exam:  General: NAD  CV: RR, S1, S2, no M/R/G  Lungs: CTA b/l, good air flow b/l   Abdomen: Soft, appropriately tender, normoactive bowel sounds  : no bleeding on pad  Ext: No pain or swelling     Labs:                        8.3    9.32  )-----------( 330      ( 09 Nov 2020 05:15 )             26.7   baso 0.1    eos 1.4    imm gran 0.3    lymph 38.6   mono 7.2    poly 52.4                         8.8    9.25  )-----------( 306      ( 08 Nov 2020 13:02 )             27.9   baso x      eos x      imm gran x      lymph x      mono x      poly x                            9.2    10.81 )-----------( 310      ( 08 Nov 2020 05:10 )             28.9   baso 0.1    eos 0.0    imm gran 0.5    lymph 7.4    mono 2.5    poly 89.5                         10.6   11.41 )-----------( 371      ( 07 Nov 2020 12:10 )             33.6   baso 0.1    eos 0.2    imm gran 0.5    lymph 15.9   mono 5.4    poly 77.9       MEDICATIONS  (STANDING):  acetaminophen   Tablet .. 975 milliGRAM(s) Oral every 6 hours  cefTRIAXone   IVPB 2000 milliGRAM(s) IV Intermittent every 24 hours  heparin   Injectable 5000 Unit(s) SubCutaneous every 12 hours  ibuprofen  Tablet. 600 milliGRAM(s) Oral every 6 hours  influenza   Vaccine 0.5 milliLiter(s) IntraMuscular once  sodium chloride 0.9% lock flush 3 milliLiter(s) IV Push every 8 hours    MEDICATIONS  (PRN):  ALBUTerol    90 MICROgram(s) HFA Inhaler 2 Puff(s) Inhalation every 6 hours PRN Shortness of Breath and/or Wheezing  ALPRAZolam 0.25 milliGRAM(s) Oral every 8 hours PRN anxiety  nicotine -  14 mG/24Hr(s) Patch 1 patch Transdermal daily PRN smoking cessation

## 2020-11-10 NOTE — PROVIDER CONTACT NOTE (OTHER) - ACTION/TREATMENT ORDERED:
move forward with midline placement with negative preliminary results
no new interventions at this time, provider will come see pt.  don't give 1x dose of medication until next @1700 due to tylenol give @1100

## 2020-11-10 NOTE — PROGRESS NOTE ADULT - PROBLEM SELECTOR PLAN 1
Neuro: PO pain meds   CV: Hemodynamically stable  Pulm: Saturating well on room air, encourage oob/amb  GI: Continue regular diet  : voiding spontaneously   Heme: c/w HSQ and SCDs for DVT ppx  ID: Afebrile, ID recommends TTE, Midline and IV ceftriaxone for 2 weeks   Endo: No active issues   Dispo: Continue routine post-op care, Midline IV and home care for continued antibiotics    Marine Cordova, PGY1 Neuro: PO pain meds   CV: Hemodynamically stable  Pulm: Saturating well on room air, encourage oob/amb, Nicotine patch prn   GI: Continue regular diet  : voiding spontaneously   Heme: c/w HSQ and SCDs for DVT ppx  ID: Afebrile, ID recommends TTE, Midline and IV ceftriaxone for 2 weeks   Endo: No active issues   Dispo: Continue routine post-op care, Midline IV and home care for continued antibiotics    Marine Cordova, PGY1

## 2020-11-10 NOTE — PROGRESS NOTE ADULT - SUBJECTIVE AND OBJECTIVE BOX
CHIEF COMPLAINT: f/u     SUBJECTIVE / OVERNIGHT EVENTS: No acute events overnight. Reports continued constant throbbing headache from front of head to back and motrin/tylenol doesn't help. Also reports eyes feel strained but doesn't use glasses regularly only with driving. Vomited twice last night. No weakness or numbness/tingling.    MEDICATIONS  (STANDING):  acetaminophen   Tablet .. 975 milliGRAM(s) Oral every 6 hours  ascorbic acid 500 milliGRAM(s) Oral daily  cefTRIAXone   IVPB 2000 milliGRAM(s) IV Intermittent every 24 hours  ferrous    sulfate 325 milliGRAM(s) Oral daily  heparin   Injectable 5000 Unit(s) SubCutaneous every 12 hours  ibuprofen  Tablet. 600 milliGRAM(s) Oral every 6 hours  influenza   Vaccine 0.5 milliLiter(s) IntraMuscular once  senna 2 Tablet(s) Oral at bedtime  sodium chloride 0.9% lock flush 3 milliLiter(s) IV Push every 8 hours    MEDICATIONS  (PRN):  ALBUTerol    90 MICROgram(s) HFA Inhaler 2 Puff(s) Inhalation every 6 hours PRN Shortness of Breath and/or Wheezing  ALPRAZolam 0.25 milliGRAM(s) Oral every 8 hours PRN anxiety  nicotine -  14 mG/24Hr(s) Patch 1 patch Transdermal daily PRN smoking cessation      VITALS:  T(F): 98.1 (11-10-20 @ 09:52), Max: 98.4 (11-09-20 @ 16:55)  HR: 71 (11-10-20 @ 09:52) (60 - 83)  BP: 102/60 (11-10-20 @ 09:52) (94/57 - 108/70)  RR: 18 (11-10-20 @ 09:52) (17 - 18)  SpO2: 99% (11-10-20 @ 09:52)  Wt(kg): --      CAPILLARY BLOOD GLUCOSE      PHYSICAL EXAM:  GENERAL: NAD, well-groomed, well-developed, on room air  EYES: sclera clear  ENMT: Moist mucous membranes  RESPIRATORY: Clear to auscultation bilaterally  CARDIOVASCULAR: RRR, no murmurs appreciated  GASTROINTESTINAL: Soft, Nontender, Nondistended; Bowel sounds present  EXTREMITIES:  WWP, no edema  NERVOUS SYSTEM:  Alert & Oriented X3; Moving all 4 extremities; No gross deficits  PSYCH: calm cooperative  SKIN: No rashes or lesions    LABS:              9.1                  x    | x    | x            8.19  >-----------< 373     ------------------------< x                     27.9                 x    | x    | x                                            Ca x     Mg x     Ph x                          MICROBIOLOGY:        RADIOLOGY & ADDITIONAL TESTS:  Imaging Personally Reviewed: [ ] Yes    [ ] Consultant(s) Notes Reviewed:  [x] Care Discussed with Consultants/Other Providers:

## 2020-11-10 NOTE — PROGRESS NOTE ADULT - SUBJECTIVE AND OBJECTIVE BOX
Follow Up:      Inverval History/ROS:Patient is a 40y old  Female who presents with a chief complaint of septic incomplete  (10 Nov 2020 11:39)    Denies fever  Less pain  Overall, feels better      Allergies    latex (Rash)  No Known Drug Allergies    Intolerances        ANTIMICROBIALS:  cefTRIAXone   IVPB 2000 every 24 hours      OTHER MEDS:  acetaminophen   Tablet .. 975 milliGRAM(s) Oral every 6 hours  acetaminophen 325 mG/butalbital 50 mG/caffeine 40 mG 1 Tablet(s) Oral once  ALBUTerol    90 MICROgram(s) HFA Inhaler 2 Puff(s) Inhalation every 6 hours PRN  ALPRAZolam 0.25 milliGRAM(s) Oral every 8 hours PRN  ascorbic acid 500 milliGRAM(s) Oral daily  ferrous    sulfate 325 milliGRAM(s) Oral daily  heparin   Injectable 5000 Unit(s) SubCutaneous every 12 hours  ibuprofen  Tablet. 600 milliGRAM(s) Oral every 6 hours  influenza   Vaccine 0.5 milliLiter(s) IntraMuscular once  nicotine -  14 mG/24Hr(s) Patch 1 patch Transdermal daily PRN  senna 2 Tablet(s) Oral at bedtime  sodium chloride 0.9% lock flush 3 milliLiter(s) IV Push every 8 hours      Vital Signs Last 24 Hrs  T(C): 36.7 (10 Nov 2020 09:52), Max: 36.9 (2020 16:55)  T(F): 98.1 (10 Nov 2020 09:52), Max: 98.4 (2020 16:55)  HR: 71 (10 Nov 2020 09:52) (60 - 83)  BP: 102/60 (10 Nov 2020 09:52) (94/57 - 108/70)  BP(mean): --  RR: 18 (10 Nov 2020 09:52) (17 - 18)  SpO2: 99% (10 Nov 2020 09:52) (95% - 100%)    PHYSICAL EXAM:  General: [x ] non-toxic  HEAD/EYES: [ ] PERRL [ ] white sclera [ ] icterus  ENT:  [ ] normal [x ] supple [ ] thrush [ ] pharyngeal exudate  Cardiovascular:   [ ] murmur [ x] normal [ ] PPM/AICD  Respiratory:  [ x] clear to ausculation bilaterally  GI:  [x ] soft, non-tender, normal bowel sounds  :  [ ] cary [x ] no CVA tenderness   Musculoskeletal:  [ ] no synovitis  Neurologic:  [ ] non-focal exam   Skin:  [ ] no rash  Lymph: [x ] no lymphadenopathy  Psychiatric:  [ ] appropriate affect [ ] alert & oriented  Lines:  [x ] no phlebitis [ ] central line                                9.1    8.19  )-----------( 373      ( 10 Nov 2020 06:00 )             27.9                   MICROBIOLOGY:Culture Results:   No growth to date. (20 @ 20:45)  Culture Results:   Growth in anaerobic bottle: Streptococcus agalactiae (Group B)  "Due to technical problems, Proteus sp. will Not be reported as part of  the BCID panel until further notice"  ***Blood Panel PCR results on this specimen are available  approximately 3 hours after the Gram stain result.***  Gram stain, PCR, and/or culture results may not always  correspond due to difference in methodologies.  ************************************************************  This PCR assay was performed using [employer].  The following targets are tested for: Enterococcus,  vancomycin resistant enterococci, Listeria monocytogenes,  coagulase negative staphylococci, S. aureus,  methicillin resistant S. aureus, Streptococcus agalactiae  (Group B), S. pneumoniae, S. pyogenes (Group A),  Acinetobacter baumannii, Enterobacter cloacae, E. coli,  Klebsiella oxytoca, K. pneumoniae, Proteus sp.,  Serratia marcescens, Haemophilus influenzae,  Neisseria meningitidis, Pseudomonas aeruginosa, Candida  albicans, C. glabrata, C krusei, C parapsilosis,  C. tropicalis and the KPC resistance gene. (20 @ 17:25)  Culture Results:   >100,000 CFU/ml Streptococcus agalactiae (Group B) isolated  Group B streptococci are susceptible to ampicillin,  penicillin and cefazolin, but may be resistant to  erythromycin and clindamycin.  Recommendations for intrapartum prophylaxis for Group B  streptococci are penicillin or ampicillin. (20 @ 17:20)      RADIOLOGY:

## 2020-11-10 NOTE — PROGRESS NOTE ADULT - ATTENDING COMMENTS
Pt seen and examined by me @515pm.  She reported resolution of chills and good pain control.  Agree with assessment and plan by Dr. Maynard.  s/p DVC for septic ab, pos 1 of 2 fBCx with gram pos cocci, on Invanz.  Await speciation and plan for ABx treatment.
patient seen and examined at bedside, agree with above assessment and plan
Patient seen and examined by me.  Agree with above assessment and plan  ID consult and recommendations appreciated.   Will order echo, f/up rpt BCx, change Abx to Ceftriaxone  Need to f/up rec for continued Abx (IV vs PO

## 2020-11-10 NOTE — ADVANCED PRACTICE NURSE CONSULT - ASSESSMENT
Patient is aware and alert. Midline insertion along with risks, benefits, possible complications and infection prevention explained to patient who verbalized understanding. All questions addressed and patient gave verbal consent to place midline. Left arm cleansed with CHG. Using sterile technique under ultra sound guidance, placed PowerGlide Pro Midline 20G /10cm into Left Basilic vein. Brisk blood return and flushed with 20Mls of normal saline. Minimal blood loss and patient tolerated procedure well. CHG dressing placed. All sharps accounted for. Report given to district RN, ordering provider and case management. LOT#: [id number], REF#: [id number]

## 2020-11-10 NOTE — CHART NOTE - NSCHARTNOTEFT_GEN_A_CORE
GYN CHART NOTE    Midline successfully placed.  Results from TTE reviewed.    < from: Transthoracic Echocardiogram (11.10.20 @ 11:55) >  Patient name: ROBBIE JUARES  YOB: 1980   Age: 40 (F)   MR#: [id number]  Study Date: 11/10/2020  Location: [place] [id number]: [facility] [place]  Study quality: Technically Fair  Referring Physician: Karen Devine MD  Blood Pressure: 102/60 mmHg  Height: 160 cm  Weight: 74 kg  BSA: 1.8 m2  ------------------------------------------------------------------------  PROCEDURE: Transthoracic echocardiogram with 2-D, M-Mode  and complete spectral and color flow Doppler.  INDICATION: Endocarditis, valve unspecified (I38)  ------------------------------------------------------------------------  DIMENSIONS:  Dimensions:     Normal Values:  LA:     2.6 cm    2.0 - 4.0 cm  Ao:     3.1 cm    2.0 - 3.8 cm  SEPTUM: 0.6 cm    0.6 - 1.2 cm  PWT:    0.7 cm    0.6 - 1.1 cm  LVIDd:  4.9 cm    3.0 - 5.6 cm  LVIDs:  3.3 cm    1.8 - 4.0 cm  Derived Variables:  LVMI: 57 g/m2  RWT: 0.28  Fractional short: 33 %  Ejection Fraction (Arashicholtz): 61 %  ------------------------------------------------------------------------  OBSERVATIONS:  Mitral Valve: Normal mitral valve. Minimal mitral  regurgitation.  Aortic Root: Normal aortic root.  Aortic Valve: Aortic valve leaflet morphology not well  visualized.  Left Atrium: Normal left atrium.  LA volume index = 17  cc/m2.  Left Ventricle: Normal left ventricular systolic function.  No segmental wall motion abnormalities. Normal left  ventricular internal dimensions and wall thicknesses.  Normal left ventricular diastolic function.  Right Heart: Normal right atrium. Normal right ventricular  size and function. Normal tricuspid valve.  Minimal  tricuspid regurgitation. Normal pulmonic valve.  Mild  pulmonic regurgitation.  Pericardium/PleuraNormal pericardium with no pericardial  effusion.  ------------------------------------------------------------------------  CONCLUSIONS:  1. Normal mitral valve. Minimal mitral regurgitation.  2. Normal left ventricular internal dimensions and wall  thicknesses.  3. Normal left ventricular systolic function. No segmental  wall motion abnormalities.  4. Normal left ventricular diastolic function.  5. Normal right ventricular size and function.  Unable to exclude endocarditis.  Consider SAURAV for further  evaluation, if clinically indicated.  ------------------------------------------------------------------------  Confirmed on  11/10/2020 - 13:55:43 by Patrick Valverde M.D.  ------------------------------------------------------------------------    < end of copied text >    D/w Dr. Melgoza re: inability for TTE to exclude endocarditis.  Per Dr. Melgoza, if no gross abnl or vegetations on TTE, ok to defer additional imaging with SAURAV.    - C/w plan for outpt IV abx Ceftriaxone 2g qD through 11/21.  - Working with case management to establish outpt abx    Charisma Frances R2  #[phone]

## 2020-11-11 VITALS
DIASTOLIC BLOOD PRESSURE: 54 MMHG | TEMPERATURE: 98 F | SYSTOLIC BLOOD PRESSURE: 104 MMHG | HEART RATE: 77 BPM | RESPIRATION RATE: 16 BRPM | OXYGEN SATURATION: 100 %

## 2020-11-11 LAB
BASOPHILS # BLD AUTO: 0.02 K/UL — SIGNIFICANT CHANGE UP (ref 0–0.2)
BASOPHILS NFR BLD AUTO: 0.3 % — SIGNIFICANT CHANGE UP (ref 0–2)
EOSINOPHIL # BLD AUTO: 0.19 K/UL — SIGNIFICANT CHANGE UP (ref 0–0.5)
EOSINOPHIL NFR BLD AUTO: 2.4 % — SIGNIFICANT CHANGE UP (ref 0–6)
HCT VFR BLD CALC: 30.2 % — LOW (ref 34.5–45)
HGB BLD-MCNC: 9.7 G/DL — LOW (ref 11.5–15.5)
IMM GRANULOCYTES NFR BLD AUTO: 0.3 % — SIGNIFICANT CHANGE UP (ref 0–1.5)
LYMPHOCYTES # BLD AUTO: 3.59 K/UL — HIGH (ref 1–3.3)
LYMPHOCYTES # BLD AUTO: 44.9 % — HIGH (ref 13–44)
MCHC RBC-ENTMCNC: 28 PG — SIGNIFICANT CHANGE UP (ref 27–34)
MCHC RBC-ENTMCNC: 32.1 % — SIGNIFICANT CHANGE UP (ref 32–36)
MCV RBC AUTO: 87.3 FL — SIGNIFICANT CHANGE UP (ref 80–100)
MONOCYTES # BLD AUTO: 0.4 K/UL — SIGNIFICANT CHANGE UP (ref 0–0.9)
MONOCYTES NFR BLD AUTO: 5 % — SIGNIFICANT CHANGE UP (ref 2–14)
NEUTROPHILS # BLD AUTO: 3.77 K/UL — SIGNIFICANT CHANGE UP (ref 1.8–7.4)
NEUTROPHILS NFR BLD AUTO: 47.1 % — SIGNIFICANT CHANGE UP (ref 43–77)
NRBC # FLD: 0 K/UL — SIGNIFICANT CHANGE UP (ref 0–0)
PLATELET # BLD AUTO: 411 K/UL — HIGH (ref 150–400)
PMV BLD: 9.4 FL — SIGNIFICANT CHANGE UP (ref 7–13)
RBC # BLD: 3.46 M/UL — LOW (ref 3.8–5.2)
RBC # FLD: 15.9 % — HIGH (ref 10.3–14.5)
WBC # BLD: 7.99 K/UL — SIGNIFICANT CHANGE UP (ref 3.8–10.5)
WBC # FLD AUTO: 7.99 K/UL — SIGNIFICANT CHANGE UP (ref 3.8–10.5)

## 2020-11-11 PROCEDURE — 99232 SBSQ HOSP IP/OBS MODERATE 35: CPT

## 2020-11-11 RX ORDER — CEFTRIAXONE 500 MG/1
2000 INJECTION, POWDER, FOR SOLUTION INTRAMUSCULAR; INTRAVENOUS EVERY 24 HOURS
Refills: 0 | Status: DISCONTINUED | OUTPATIENT
Start: 2020-11-11 | End: 2020-11-11

## 2020-11-11 RX ADMIN — Medication 1 PATCH: at 07:33

## 2020-11-11 RX ADMIN — CEFTRIAXONE 100 MILLIGRAM(S): 500 INJECTION, POWDER, FOR SOLUTION INTRAMUSCULAR; INTRAVENOUS at 13:42

## 2020-11-11 RX ADMIN — HEPARIN SODIUM 5000 UNIT(S): 5000 INJECTION INTRAVENOUS; SUBCUTANEOUS at 05:10

## 2020-11-11 RX ADMIN — Medication 600 MILLIGRAM(S): at 00:45

## 2020-11-11 RX ADMIN — Medication 500 MILLIGRAM(S): at 11:55

## 2020-11-11 RX ADMIN — Medication 600 MILLIGRAM(S): at 12:30

## 2020-11-11 RX ADMIN — SODIUM CHLORIDE 3 MILLILITER(S): 9 INJECTION INTRAMUSCULAR; INTRAVENOUS; SUBCUTANEOUS at 05:09

## 2020-11-11 RX ADMIN — Medication 600 MILLIGRAM(S): at 11:56

## 2020-11-11 RX ADMIN — Medication 600 MILLIGRAM(S): at 00:00

## 2020-11-11 RX ADMIN — Medication 325 MILLIGRAM(S): at 11:55

## 2020-11-11 RX ADMIN — Medication 600 MILLIGRAM(S): at 05:09

## 2020-11-11 RX ADMIN — SODIUM CHLORIDE 3 MILLILITER(S): 9 INJECTION INTRAMUSCULAR; INTRAVENOUS; SUBCUTANEOUS at 13:41

## 2020-11-11 NOTE — PROGRESS NOTE ADULT - SUBJECTIVE AND OBJECTIVE BOX
Follow Up:      Inverval History/ROS:Patient is a 40y old  Female who presents with a chief complaint of septic incomplete  (2020 11:16)    No fever  Less pelvic pain    Allergies    latex (Rash)  No Known Drug Allergies    Intolerances        ANTIMICROBIALS:  cefTRIAXone   IVPB 2000 every 24 hours      OTHER MEDS:  acetaminophen   Tablet .. 975 milliGRAM(s) Oral every 6 hours PRN  ALBUTerol    90 MICROgram(s) HFA Inhaler 2 Puff(s) Inhalation every 6 hours PRN  ALPRAZolam 0.25 milliGRAM(s) Oral every 8 hours PRN  ascorbic acid 500 milliGRAM(s) Oral daily  ferrous    sulfate 325 milliGRAM(s) Oral daily  heparin   Injectable 5000 Unit(s) SubCutaneous every 12 hours  ibuprofen  Tablet. 600 milliGRAM(s) Oral every 6 hours  influenza   Vaccine 0.5 milliLiter(s) IntraMuscular once  nicotine -  14 mG/24Hr(s) Patch 1 patch Transdermal daily PRN  senna 2 Tablet(s) Oral at bedtime  sodium chloride 0.9% lock flush 3 milliLiter(s) IV Push every 8 hours      Vital Signs Last 24 Hrs  T(C): 36.8 (2020 09:46), Max: 37.1 (10 Nov 2020 21:55)  T(F): 98.3 (2020 09:46), Max: 98.7 (10 Nov 2020 21:55)  HR: 77 (2020 09:46) (65 - 84)  BP: 104/54 (2020 09:46) (103/61 - 111/63)  BP(mean): --  RR: 16 (2020 09:46) (16 - 18)  SpO2: 100% (2020 09:46) (94% - 100%)    PHYSICAL EXAM:  General: [x ] non-toxic  HEAD/EYES: [ ] PERRL [x ] white sclera [ ] icterus  ENT:  [ ] normal [ x] supple [ ] thrush [ ] pharyngeal exudate  Cardiovascular:   [ ] murmur [ ] normal [ ] PPM/AICD  Respiratory:  [ x] clear to ausculation bilaterally  GI:  [x ] soft, non-tender, normal bowel sounds  :  [ ] cary [ ] no CVA tenderness   Musculoskeletal:  x[ ] no synovitis  Neurologic:  [ ] non-focal exam   Skin:  x[ ] no rash  Lymph: [ ] no lymphadenopathy  Psychiatric:  [ ] appropriate affect x[ ] alert & oriented  Lines:  [ x] no phlebitis [ ] central line                                9.7    7.99  )-----------( 411      ( 2020 05:30 )             30.2                   MICROBIOLOGY:Culture Results:   No growth to date. (20 @ 12:30)  Culture Results:   No growth to date. (20 @ 12:30)  Culture Results:   No growth to date. (20 @ 20:45)  Culture Results:   Growth in anaerobic bottle: Streptococcus agalactiae (Group B)  "Due to technical problems, Proteus sp. will Not be reported as part of  the BCID panel until further notice"  ***Blood Panel PCR results on this specimen are available  approximately 3 hours after the Gram stain result.***  Gram stain, PCR, and/or culture results may not always  correspond due to difference in methodologies.  ************************************************************  This PCR assay was performed using [employer].  The following targets are tested for: Enterococcus,  vancomycin resistant enterococci, Listeria monocytogenes,  coagulase negative staphylococci, S. aureus,  methicillin resistant S. aureus, Streptococcus agalactiae  (Group B), S. pneumoniae, S. pyogenes (Group A),  Acinetobacter baumannii, Enterobacter cloacae, E. coli,  Klebsiella oxytoca, K. pneumoniae, Proteus sp.,  Serratia marcescens, Haemophilus influenzae,  Neisseria meningitidis, Pseudomonas aeruginosa, Candida  albicans, C. glabrata, C krusei, C parapsilosis,  C. tropicalis and the KPC resistance gene. (20 @ 17:25)  Culture Results:   >100,000 CFU/ml Streptococcus agalactiae (Group B) isolated  Group B streptococci are susceptible to ampicillin,  penicillin and cefazolin, but may be resistant to  erythromycin and clindamycin.  Recommendations for intrapartum prophylaxis for Group B  streptococci are penicillin or ampicillin. (20 @ 17:20)      RADIOLOGY:

## 2020-11-11 NOTE — PROGRESS NOTE ADULT - SUBJECTIVE AND OBJECTIVE BOX
ABRAHAM ERNST Progress Note  POD#4   HD#5    Patient seen and examined at bedside.  She states that her headache is still there, however now it is mild and seems to be improving. She has some nausea but denies any episodes of emesis. No acute events overnight. No acute complaints.  Pain well controlled. Patient is ambulating and tolerating PO diet.  Patient is passing flatus.  Patient is voiding spontaneously. Denies CP, SOB, N/V, fevers, and chills.    Vital Signs Last 24 Hours  T(C): 36.7 (11-11-20 @ 05:08), Max: 37.1 (11-10-20 @ 14:09)  HR: 65 (11-11-20 @ 05:08) (65 - 84)  BP: 106/57 (11-11-20 @ 05:08) (102/60 - 111/63)  RR: 18 (11-11-20 @ 05:08) (17 - 18)  SpO2: 100% (11-11-20 @ 05:08) (94% - 100%)    I&O's Summary    09 Nov 2020 07:01  -  10 Nov 2020 07:00  --------------------------------------------------------  IN: 1100 mL / OUT: 1100 mL / NET: 0 mL    10 Nov 2020 07:01  -  11 Nov 2020 06:31  --------------------------------------------------------  IN: 350 mL / OUT: 1800 mL / NET: -1450 mL      Physical Exam:  General: NAD  CV: RR, S1, S2, no M/R/G  Lungs: CTA b/l, good air flow b/l   Abdomen: Soft, appropriately tender in the LLQ and RLQ, normoactive bowel sounds  : no bleeding on pad  Ext: No pain or swelling     Labs:                        9.7    7.99  )-----------( 411      ( 11 Nov 2020 05:30 )             30.2   baso 0.3    eos 2.4    imm gran 0.3    lymph 44.9   mono 5.0    poly 47.1                         9.1    8.19  )-----------( 373      ( 10 Nov 2020 06:00 )             27.9   baso 0.1    eos 2.0    imm gran 0.2    lymph 48.7   mono 6.0    poly 43.0                         8.3    9.32  )-----------( 330      ( 09 Nov 2020 05:15 )             26.7   baso 0.1    eos 1.4    imm gran 0.3    lymph 38.6   mono 7.2    poly 52.4                         8.8    9.25  )-----------( 306      ( 08 Nov 2020 13:02 )             27.9   baso x      eos x      imm gran x      lymph x      mono x      poly x          MEDICATIONS  (STANDING):  ascorbic acid 500 milliGRAM(s) Oral daily  cefTRIAXone   IVPB 2000 milliGRAM(s) IV Intermittent every 24 hours  ferrous    sulfate 325 milliGRAM(s) Oral daily  heparin   Injectable 5000 Unit(s) SubCutaneous every 12 hours  ibuprofen  Tablet. 600 milliGRAM(s) Oral every 6 hours  influenza   Vaccine 0.5 milliLiter(s) IntraMuscular once  senna 2 Tablet(s) Oral at bedtime  sodium chloride 0.9% lock flush 3 milliLiter(s) IV Push every 8 hours    MEDICATIONS  (PRN):  acetaminophen   Tablet .. 975 milliGRAM(s) Oral every 6 hours PRN Moderate Pain (4 - 6)  ALBUTerol    90 MICROgram(s) HFA Inhaler 2 Puff(s) Inhalation every 6 hours PRN Shortness of Breath and/or Wheezing  ALPRAZolam 0.25 milliGRAM(s) Oral every 8 hours PRN anxiety  nicotine -  14 mG/24Hr(s) Patch 1 patch Transdermal daily PRN smoking cessation         ABRAHAM ERNST Progress Note  POD#4   HD#5    Patient seen and examined at bedside.  She states that her headache is still there, however now it is mild and seems to be improving. She has some nausea but denies any episodes of emesis. No acute events overnight. No acute complaints.  Pain well controlled. Patient is ambulating and tolerating PO diet.  Patient is passing flatus and having bowel movements.  Patient is voiding spontaneously. Denies CP, SOB, fevers, and chills.    Vital Signs Last 24 Hours  T(C): 36.7 (11-11-20 @ 05:08), Max: 37.1 (11-10-20 @ 14:09)  HR: 65 (11-11-20 @ 05:08) (65 - 84)  BP: 106/57 (11-11-20 @ 05:08) (102/60 - 111/63)  RR: 18 (11-11-20 @ 05:08) (17 - 18)  SpO2: 100% (11-11-20 @ 05:08) (94% - 100%)    I&O's Summary    09 Nov 2020 07:01  -  10 Nov 2020 07:00  --------------------------------------------------------  IN: 1100 mL / OUT: 1100 mL / NET: 0 mL    10 Nov 2020 07:01  -  11 Nov 2020 06:31  --------------------------------------------------------  IN: 350 mL / OUT: 1800 mL / NET: -1450 mL      Physical Exam:  General: NAD  CV: RR, S1, S2, no M/R/G  Lungs: CTA b/l, good air flow b/l   Abdomen: Soft, appropriately tender in the LLQ and RLQ, normoactive bowel sounds  : no bleeding on pad  Ext: No pain or swelling     Labs:                        9.7    7.99  )-----------( 411      ( 11 Nov 2020 05:30 )             30.2   baso 0.3    eos 2.4    imm gran 0.3    lymph 44.9   mono 5.0    poly 47.1                         9.1    8.19  )-----------( 373      ( 10 Nov 2020 06:00 )             27.9   baso 0.1    eos 2.0    imm gran 0.2    lymph 48.7   mono 6.0    poly 43.0                         8.3    9.32  )-----------( 330      ( 09 Nov 2020 05:15 )             26.7   baso 0.1    eos 1.4    imm gran 0.3    lymph 38.6   mono 7.2    poly 52.4                         8.8    9.25  )-----------( 306      ( 08 Nov 2020 13:02 )             27.9   baso x      eos x      imm gran x      lymph x      mono x      poly x          MEDICATIONS  (STANDING):  ascorbic acid 500 milliGRAM(s) Oral daily  cefTRIAXone   IVPB 2000 milliGRAM(s) IV Intermittent every 24 hours  ferrous    sulfate 325 milliGRAM(s) Oral daily  heparin   Injectable 5000 Unit(s) SubCutaneous every 12 hours  ibuprofen  Tablet. 600 milliGRAM(s) Oral every 6 hours  influenza   Vaccine 0.5 milliLiter(s) IntraMuscular once  senna 2 Tablet(s) Oral at bedtime  sodium chloride 0.9% lock flush 3 milliLiter(s) IV Push every 8 hours    MEDICATIONS  (PRN):  acetaminophen   Tablet .. 975 milliGRAM(s) Oral every 6 hours PRN Moderate Pain (4 - 6)  ALBUTerol    90 MICROgram(s) HFA Inhaler 2 Puff(s) Inhalation every 6 hours PRN Shortness of Breath and/or Wheezing  ALPRAZolam 0.25 milliGRAM(s) Oral every 8 hours PRN anxiety  nicotine -  14 mG/24Hr(s) Patch 1 patch Transdermal daily PRN smoking cessation

## 2020-11-11 NOTE — PROGRESS NOTE ADULT - PROBLEM SELECTOR PLAN 1
Neuro: PO pain meds   CV: Hemodynamically stable, Hct stable this morning at 30.2  Pulm: Saturating well on room air, encourage oob/amb  GI: Continue regular diet, Zofran PRN for nausea  : voiding spontaneously   Heme: c/w HSQ and SCDs for DVT ppx  ID: Afebrile, 2 weeks of ceftriaxone at home with homecare  Endo: No active issues   Dispo: Continue routine post-op care, dispo once homecare set up for IV antibiotics after tonight's dose of CTX    Marine Cordova, PGY1 Neuro: PO pain meds   CV: Hemodynamically stable, Hct stable this morning at 30.2  Pulm: Saturating well on room air, encourage oob/amb  GI: Continue regular diet, Zofran PRN for nausea  : voiding spontaneously   Heme: c/w HSQ and SCDs for DVT ppx  ID: Afebrile, TTE (11/10): no vegetations noted, recommend 2 weeks of ceftriaxone at home with homecare  Endo: No active issues   Dispo: Continue routine post-op care, dispo once homecare set up for IV antibiotics after tonight's dose of CTX    Marine Cordova, PGY1

## 2020-11-11 NOTE — PROGRESS NOTE ADULT - ASSESSMENT
40 year old female with septic  with retained products s/p d and c    Course complicated by group B strep bacteremia.  I suspect this is transient related to septic .   But group B strep can be associated with more disseminated infection.      Repeat blood culture without growth.  Echo without obvious abnormality.    Plan for 14 day course of antibiotics.  Can change to Ceftriaxone 2 gram iv daily through .  I think IV antibiotics are safer than po with group b strep bacteremia  Weekly CBC CMP fax [phone]
40 year old female with septic  with retained products s/p d and c    Course complicated by group B strep bacteremia.  I suspect this is transient related to septic .   But group B strep can be associated with more disseminated infection.      Repeat blood cultures testing   Check an echo    Plan for 14 day course of antibiotics.  Can change to Ceftriaxone 2 gram iv daily through .  I think IV antibiotics are safer than po with group b strep bacteremia  Weekly CBC CMP fax [phone]
40 yr old woman PMH anemia, asthma, anxiety, recently found to be pregnant by PCP which was complicated and now admitted to GYN service for incomplete septic  with Group B strep bacteremia. S/p D&C .    # headache  reporting throbbing headache  no focal deficits  motrin and tylenol not helping per patient  CTH if signs/symptoms of neurological focal deficits  Consider trial of Esgic (call pharmacy for availability)    # anemia  multifactorial  has history of anemia and with blood loss given recent miscarriage and D&C  noted iron deficiency: can start iron tabs qd  transfuse for Hgb<7    # incomplete septic  c/b Group B strep bacteremia  sepsis met on admission  s/p D&C  Bcx with Group B strep in anaerobic bottle, source likely from urine and procedure: Ucx also with Group B strep  On CTX per ID recs and to be continued for 14 days, awaiting midline once repeat Bcx without growth and TTE  f/u repeat cultures from   pain control per primary team    # asthma  presently not active  albuterol HFA prn    # polysubstance abuse: active smoker and ETOH use  - advised smoking cessation  - apply nicotine patch if patient agreeable  - currently not showing signs of ETOH withdrawal, will continue to monitor    # anxiety  can consider low dose xanax 0.25mg TID PRN  outpatient psych referral      
40 yr old woman PMH anemia, asthma, anxiety, recently found to be pregnant by PCP which was complicated and now admitted to GYN service for incomplete septic  with Group B strep bacteremia. S/p D&C .    # headache  reporting throbbing headache but also with intermittent blurry vision, now improving  no focal deficits  motrin and tylenol not helping per patient  CTH if signs/symptoms of neurological focal deficits but otherwise recommend outpt optho followup  Continue with Esgic as this helped with headache (call pharmacy for availability)  Headache can also be related to nicotine or caffeine withdrawal and lack of adequate sleep  Consider melatonin qhs    # anemia  multifactorial  has history of anemia and with blood loss given recent miscarriage and D&C  noted iron deficiency: can start iron tabs qd  transfuse for Hgb<7    # incomplete septic  c/b Group B strep bacteremia  sepsis met on admission  s/p D&C  Bcx with Group B strep in anaerobic bottle, source likely from urine and procedure: Ucx also with Group B strep  On CTX per ID recs and to be continued for 14 days, Repeat Bcx without growth, s/p LUE midline  TTE without notable vegetations, EF 61%, no WMA and nml LVSF  Awaiting home care infusion to be set up    # asthma  presently not active  albuterol HFA prn    # polysubstance abuse: active smoker and ETOH use  - advised smoking cessation  - apply nicotine patch if patient agreeable  - currently not showing signs of ETOH withdrawal, will continue to monitor    # anxiety  can consider low dose xanax 0.25mg TID PRN  outpatient psych referral      
A/P:  40y a/w septic incomplete  POD#3 s/p D+C. Pt BCx found to be Group B strep and is receiving ceftriaxone since . Pt has been afebrile since Tmax 38.4 on  at 520p. ID recommends 2 weeks of ceftriaxone and TTE. Patient is stable and doing well.
A/P: 40y a/w septic incomplete  POD#2 s/p D+C. Pt BCx found to be growing gram positive cocci on  and is receiving Invanz since  post-op, s/p Gentamycin and clindamycin (-) pre-op. Pt has been afebrile since Tmax 38.4 on  at 520p.  Patient is stable and doing well.
A/P: 40y a/w septic incomplete  POD#4 s/p D+C. Pt BCx found to be Group B strep and is receiving ceftriaxone since . Pt has been afebrile since Tmax 38.4 on  at 520p. ID recommends 2 weeks of ceftriaxone at home. Patient is stable and doing well.
A/P: 40y POD#1 s/p DVC for septic incomplete . S/p Gent/Clindax1 preop and s/pInvanz x1 postop. Afebrile.  Patient is stable and doing well.

## 2020-11-11 NOTE — PROGRESS NOTE ADULT - SUBJECTIVE AND OBJECTIVE BOX
CHIEF COMPLAINT: f/u     SUBJECTIVE / OVERNIGHT EVENTS: No acute events overnight. States headache is better and rated 4/10. Reports having intermittent blurry vision for past 2 weeks. States she only drinks about 1-2 cups of coffee daily and does admit to smoking nicotine in beginning of her pregnancy. States she feels nauseated with drinking milk. No vomiting.    MEDICATIONS  (STANDING):  ascorbic acid 500 milliGRAM(s) Oral daily  cefTRIAXone   IVPB 2000 milliGRAM(s) IV Intermittent every 24 hours  ferrous    sulfate 325 milliGRAM(s) Oral daily  heparin   Injectable 5000 Unit(s) SubCutaneous every 12 hours  ibuprofen  Tablet. 600 milliGRAM(s) Oral every 6 hours  influenza   Vaccine 0.5 milliLiter(s) IntraMuscular once  senna 2 Tablet(s) Oral at bedtime  sodium chloride 0.9% lock flush 3 milliLiter(s) IV Push every 8 hours    MEDICATIONS  (PRN):  acetaminophen   Tablet .. 975 milliGRAM(s) Oral every 6 hours PRN Moderate Pain (4 - 6)  ALBUTerol    90 MICROgram(s) HFA Inhaler 2 Puff(s) Inhalation every 6 hours PRN Shortness of Breath and/or Wheezing  ALPRAZolam 0.25 milliGRAM(s) Oral every 8 hours PRN anxiety  nicotine -  14 mG/24Hr(s) Patch 1 patch Transdermal daily PRN smoking cessation      VITALS:  T(F): 98.3 (11-11-20 @ 09:46), Max: 98.8 (11-10-20 @ 14:09)  HR: 77 (11-11-20 @ 09:46) (65 - 84)  BP: 104/54 (11-11-20 @ 09:46) (103/54 - 111/63)  RR: 16 (11-11-20 @ 09:46) (16 - 18)  SpO2: 100% (11-11-20 @ 09:46)  Wt(kg): --      CAPILLARY BLOOD GLUCOSE      PHYSICAL EXAM:  GENERAL: NAD, on room air  EYES: sclera clear, EOMI, PERRL  RESPIRATORY: Clear to auscultation bilaterally  CARDIOVASCULAR: RRR, no murmurs appreciated  GASTROINTESTINAL: Soft, Nontender, Nondistended; Bowel sounds present  EXTREMITIES:  WWP, no edema, LUE midline C/D/I  NERVOUS SYSTEM:  Alert & Oriented X3; Moving all 4 extremities; No motor or sensory deficits  PSYCH: calm cooperative  SKIN: No rashes or lesions    LABS:              9.7                  x    | x    | x            7.99  >-----------< 411     ------------------------< x                     30.2                 x    | x    | x                                            Ca x     Mg x     Ph x                          MICROBIOLOGY:        RADIOLOGY & ADDITIONAL TESTS:  Imaging Personally Reviewed: [ ] Yes    [ ] Consultant(s) Notes Reviewed:  [x] Care Discussed with Consultants/Other Providers:

## 2020-11-11 NOTE — PROGRESS NOTE ADULT - REASON FOR ADMISSION
septic incomplete 

## 2020-11-12 LAB
CULTURE RESULTS: SIGNIFICANT CHANGE UP
SPECIMEN SOURCE: SIGNIFICANT CHANGE UP
SURGICAL PATHOLOGY STUDY: SIGNIFICANT CHANGE UP

## 2020-11-14 LAB
CULTURE RESULTS: SIGNIFICANT CHANGE UP
CULTURE RESULTS: SIGNIFICANT CHANGE UP
SPECIMEN SOURCE: SIGNIFICANT CHANGE UP
SPECIMEN SOURCE: SIGNIFICANT CHANGE UP

## 2020-11-20 ENCOUNTER — NON-APPOINTMENT (OUTPATIENT)
Age: 40
End: 2020-11-20

## 2021-01-02 NOTE — ED PROVIDER NOTE - ATTESTATION, MLM
I have reviewed and confirmed nurses' notes for patient's medications, allergies, medical history, and surgical history. negative...

## 2021-01-27 ENCOUNTER — APPOINTMENT (OUTPATIENT)
Dept: OBGYN | Facility: HOSPITAL | Age: 41
End: 2021-01-27

## 2021-05-28 NOTE — ED ADULT TRIAGE NOTE - RESPIRATORY RATE (BREATHS/MIN)
CM notified by Jacob Steele with [facility] that [facility] is hc service and they will do start of care on Sunday. Pt should d/c tomorrow after IV abx. CM updated pt.      Colt Ortiz RN, BSN  [phone] 18

## 2021-08-19 NOTE — ED ADULT TRIAGE NOTE - NS ED NOTE AC HIGH RISK COUNTRIES
No Update: (This section must be completed if the H&P was completed greater than 24 hrs to procedure or admission)    H&P reviewed  After examining the patient, I find no changed to the H&P since it had been written  VS:  / 95 HR 85  RR18  SPO2 96%  Patient re-evaluated   Accept as history and physical     DARIUS Oviedo/August 19, 2021/8:02 AM

## 2021-10-05 NOTE — BRIEF OPERATIVE NOTE - NSICDXBRIEFPROCEDURE_GEN_ALL_CORE_FT
PROCEDURES:  Repair, hernia, umbilical, open, adult 06-Jul-2019 21:21:31  Jamey Melendez  Laparoscopic appendectomy 06-Jul-2019 21:18:58  Jamey Melendez 78

## 2021-12-25 ENCOUNTER — EMERGENCY (EMERGENCY)
Facility: HOSPITAL | Age: 41
LOS: 1 days | Discharge: HOME | End: 2021-12-25
Attending: EMERGENCY MEDICINE | Admitting: EMERGENCY MEDICINE
Payer: MEDICAID

## 2021-12-25 VITALS
TEMPERATURE: 98 F | RESPIRATION RATE: 16 BRPM | SYSTOLIC BLOOD PRESSURE: 117 MMHG | HEART RATE: 89 BPM | HEIGHT: 63 IN | OXYGEN SATURATION: 98 % | DIASTOLIC BLOOD PRESSURE: 77 MMHG

## 2021-12-25 VITALS
TEMPERATURE: 98 F | SYSTOLIC BLOOD PRESSURE: 117 MMHG | RESPIRATION RATE: 17 BRPM | HEART RATE: 83 BPM | OXYGEN SATURATION: 100 % | DIASTOLIC BLOOD PRESSURE: 75 MMHG

## 2021-12-25 DIAGNOSIS — Z98.890 OTHER SPECIFIED POSTPROCEDURAL STATES: Chronic | ICD-10-CM

## 2021-12-25 LAB
ALBUMIN SERPL ELPH-MCNC: 4.2 G/DL — SIGNIFICANT CHANGE UP (ref 3.3–5)
ALP SERPL-CCNC: 75 U/L — SIGNIFICANT CHANGE UP (ref 40–120)
ALT FLD-CCNC: 70 U/L — HIGH (ref 4–33)
ANION GAP SERPL CALC-SCNC: 12 MMOL/L — SIGNIFICANT CHANGE UP (ref 7–14)
APPEARANCE UR: CLEAR — SIGNIFICANT CHANGE UP
AST SERPL-CCNC: 23 U/L — SIGNIFICANT CHANGE UP (ref 4–32)
BACTERIA # UR AUTO: ABNORMAL
BASOPHILS # BLD AUTO: 0.03 K/UL — SIGNIFICANT CHANGE UP (ref 0–0.2)
BASOPHILS NFR BLD AUTO: 0.4 % — SIGNIFICANT CHANGE UP (ref 0–2)
BILIRUB SERPL-MCNC: <0.2 MG/DL — SIGNIFICANT CHANGE UP (ref 0.2–1.2)
BILIRUB UR-MCNC: NEGATIVE — SIGNIFICANT CHANGE UP
BUN SERPL-MCNC: 14 MG/DL — SIGNIFICANT CHANGE UP (ref 7–23)
CALCIUM SERPL-MCNC: 8.7 MG/DL — SIGNIFICANT CHANGE UP (ref 8.4–10.5)
CHLORIDE SERPL-SCNC: 104 MMOL/L — SIGNIFICANT CHANGE UP (ref 98–107)
CO2 SERPL-SCNC: 24 MMOL/L — SIGNIFICANT CHANGE UP (ref 22–31)
COLOR SPEC: YELLOW — SIGNIFICANT CHANGE UP
CREAT SERPL-MCNC: 0.67 MG/DL — SIGNIFICANT CHANGE UP (ref 0.5–1.3)
DIFF PNL FLD: NEGATIVE — SIGNIFICANT CHANGE UP
EOSINOPHIL # BLD AUTO: 0.1 K/UL — SIGNIFICANT CHANGE UP (ref 0–0.5)
EOSINOPHIL NFR BLD AUTO: 1.4 % — SIGNIFICANT CHANGE UP (ref 0–6)
EPI CELLS # UR: 10 /HPF — HIGH (ref 0–5)
FLUAV AG NPH QL: SIGNIFICANT CHANGE UP
FLUBV AG NPH QL: SIGNIFICANT CHANGE UP
GLUCOSE SERPL-MCNC: 93 MG/DL — SIGNIFICANT CHANGE UP (ref 70–99)
GLUCOSE UR QL: NEGATIVE — SIGNIFICANT CHANGE UP
HCG UR QL: NEGATIVE — SIGNIFICANT CHANGE UP
HCT VFR BLD CALC: 39.5 % — SIGNIFICANT CHANGE UP (ref 34.5–45)
HGB BLD-MCNC: 12.9 G/DL — SIGNIFICANT CHANGE UP (ref 11.5–15.5)
HYALINE CASTS # UR AUTO: 2 /LPF — SIGNIFICANT CHANGE UP (ref 0–7)
IANC: 3.42 K/UL — SIGNIFICANT CHANGE UP (ref 1.5–8.5)
IMM GRANULOCYTES NFR BLD AUTO: 0.3 % — SIGNIFICANT CHANGE UP (ref 0–1.5)
KETONES UR-MCNC: NEGATIVE — SIGNIFICANT CHANGE UP
LEUKOCYTE ESTERASE UR-ACNC: ABNORMAL
LIDOCAIN IGE QN: 37 U/L — SIGNIFICANT CHANGE UP (ref 7–60)
LYMPHOCYTES # BLD AUTO: 3.18 K/UL — SIGNIFICANT CHANGE UP (ref 1–3.3)
LYMPHOCYTES # BLD AUTO: 44.2 % — HIGH (ref 13–44)
MCHC RBC-ENTMCNC: 28.9 PG — SIGNIFICANT CHANGE UP (ref 27–34)
MCHC RBC-ENTMCNC: 32.7 GM/DL — SIGNIFICANT CHANGE UP (ref 32–36)
MCV RBC AUTO: 88.6 FL — SIGNIFICANT CHANGE UP (ref 80–100)
MONOCYTES # BLD AUTO: 0.44 K/UL — SIGNIFICANT CHANGE UP (ref 0–0.9)
MONOCYTES NFR BLD AUTO: 6.1 % — SIGNIFICANT CHANGE UP (ref 2–14)
NEUTROPHILS # BLD AUTO: 3.42 K/UL — SIGNIFICANT CHANGE UP (ref 1.8–7.4)
NEUTROPHILS NFR BLD AUTO: 47.6 % — SIGNIFICANT CHANGE UP (ref 43–77)
NITRITE UR-MCNC: NEGATIVE — SIGNIFICANT CHANGE UP
NRBC # BLD: 0 /100 WBCS — SIGNIFICANT CHANGE UP
NRBC # FLD: 0 K/UL — SIGNIFICANT CHANGE UP
PH UR: 6 — SIGNIFICANT CHANGE UP (ref 5–8)
PLATELET # BLD AUTO: 265 K/UL — SIGNIFICANT CHANGE UP (ref 150–400)
POTASSIUM SERPL-MCNC: 3.6 MMOL/L — SIGNIFICANT CHANGE UP (ref 3.5–5.3)
POTASSIUM SERPL-SCNC: 3.6 MMOL/L — SIGNIFICANT CHANGE UP (ref 3.5–5.3)
PROT SERPL-MCNC: 7.5 G/DL — SIGNIFICANT CHANGE UP (ref 6–8.3)
PROT UR-MCNC: ABNORMAL
RBC # BLD: 4.46 M/UL — SIGNIFICANT CHANGE UP (ref 3.8–5.2)
RBC # FLD: 14.3 % — SIGNIFICANT CHANGE UP (ref 10.3–14.5)
RBC CASTS # UR COMP ASSIST: 2 /HPF — SIGNIFICANT CHANGE UP (ref 0–4)
RSV RNA NPH QL NAA+NON-PROBE: SIGNIFICANT CHANGE UP
SARS-COV-2 RNA SPEC QL NAA+PROBE: DETECTED
SODIUM SERPL-SCNC: 140 MMOL/L — SIGNIFICANT CHANGE UP (ref 135–145)
SP GR SPEC: 1.03 — SIGNIFICANT CHANGE UP (ref 1–1.05)
UROBILINOGEN FLD QL: SIGNIFICANT CHANGE UP
WBC # BLD: 7.19 K/UL — SIGNIFICANT CHANGE UP (ref 3.8–10.5)
WBC # FLD AUTO: 7.19 K/UL — SIGNIFICANT CHANGE UP (ref 3.8–10.5)
WBC UR QL: 11 /HPF — HIGH (ref 0–5)

## 2021-12-25 PROCEDURE — 99284 EMERGENCY DEPT VISIT MOD MDM: CPT

## 2021-12-25 PROCEDURE — 74177 CT ABD & PELVIS W/CONTRAST: CPT | Mod: 26,MA

## 2021-12-25 RX ORDER — SODIUM CHLORIDE 9 MG/ML
1000 INJECTION INTRAMUSCULAR; INTRAVENOUS; SUBCUTANEOUS ONCE
Refills: 0 | Status: COMPLETED | OUTPATIENT
Start: 2021-12-25 | End: 2021-12-25

## 2021-12-25 RX ORDER — NITROFURANTOIN MACROCRYSTAL 50 MG
1 CAPSULE ORAL
Qty: 14 | Refills: 0
Start: 2021-12-25 | End: 2021-12-31

## 2021-12-25 RX ORDER — ACETAMINOPHEN 500 MG
975 TABLET ORAL ONCE
Refills: 0 | Status: COMPLETED | OUTPATIENT
Start: 2021-12-25 | End: 2021-12-25

## 2021-12-25 RX ADMIN — SODIUM CHLORIDE 1000 MILLILITER(S): 9 INJECTION INTRAMUSCULAR; INTRAVENOUS; SUBCUTANEOUS at 03:38

## 2021-12-25 RX ADMIN — Medication 975 MILLIGRAM(S): at 03:38

## 2021-12-25 NOTE — ED PROVIDER NOTE - PATIENT PORTAL LINK FT
You can access the FollowMyHealth Patient Portal offered by [facility] by registering at the following website: [email or web address]. By joining [employer]’s FollowMyHealth portal, you will also be able to view your health information using other applications (apps) compatible with our system.

## 2021-12-25 NOTE — ED PROVIDER NOTE - PROGRESS NOTE DETAILS
Spoke to patient about results of a UTI and lack of urgent or emergent pathology at this time. Plan to discharge patient. Patient given follow up and return precautions. Patient agrees with plan. Spoke to patient about results of a UTI and lack of urgent or emergent pathology at this time. Plan to discharge patient. Patient given follow up and return precautions. Patient agrees with plan

## 2021-12-25 NOTE — ED ADULT NURSE NOTE - OBJECTIVE STATEMENT
Received patient from ER to spot number 13 with headache, nausea ,fever, loss of smell and taste for a week. patient had contact with covid + patient . Denies chest pain or short of breath . +diarrhea and abdominal discomfort. Placed 20 G IV to the left AC. Labs send. IVF started and meds given as ordered.

## 2021-12-25 NOTE — ED PROVIDER NOTE - NSFOLLOWUPINSTRUCTIONS_ED_ALL_ED_FT
Urinary Tract Infection    A urinary tract infection (UTI) is an infection of any part of the urinary tract, which includes the kidneys, ureters, bladder, and urethra. Risk factors include ignoring your need to urinate, wiping back to front if female, being an uncircumcised male, and having diabetes or a weak immune system. Symptoms include frequent urination, pain or burning with urination, foul smelling urine, cloudy urine, pain in the lower abdomen, blood in the urine, and fever. If you were prescribed an antibiotic medicine, take it as told by your health care provider. Do not stop taking the antibiotic even if you start to feel better.    Follow up with your primary care physician if symptoms persist.    SEEK IMMEDIATE MEDICAL CARE IF YOU HAVE ANY OF THE FOLLOWING SYMPTOMS: severe back or abdominal pain, fever, inability to keep fluids or medicine down, dizziness/lightheadedness, or a change in mental status.

## 2021-12-25 NOTE — ED ADULT TRIAGE NOTE - CHIEF COMPLAINT QUOTE
Pt c/o headache and generalized abd pain and loss of taste and smell x 1 week. pt states abd pain is sharp and intermittent. Pt has a fever to tmax 103 at home that broke on 12/21 and has been fever free for 4 days. Pt had contact with covid+ pt. No complaints of chest pain, , nausea, dizziness, vomiting  SOB, fever, chills verbalized..

## 2021-12-25 NOTE — ED PROVIDER NOTE - OBJECTIVE STATEMENT
41 year old female came to the hospital because of abdominal pain.  1 week ago she had a fever for 3 days that resolved and she took a home COVID test that was negative. 1 year ago she had her appendix removed and had a hernia repair. Over the last 1 week she has noted a headache. Today she was shopping at [employer] and noted a sharp lower abd pain that was similar to her pain when she had appendicitis, but it resolved. She received her children back from her ex  today, but he did not feel well and tested positive for COVID tonight. Her children are at [facility] getting tested for covid now. No vomiting, no chest pain, no sob.

## 2021-12-25 NOTE — ED POST DISCHARGE NOTE - RESULT SUMMARY
AMARJIT Gruber: pt called back asking for covid test result. Covid positive. Discussed quarantine/isolation and return precautions.

## 2021-12-25 NOTE — ED PROVIDER NOTE - CLINICAL SUMMARY MEDICAL DECISION MAKING FREE TEXT BOX
Pt with headache, abd pain, viral syndrome 1 week ago. Rest of the family is at [facility] for covid tests. Pt requesting ct abdomen and covid test.

## 2022-04-14 ENCOUNTER — RESULT REVIEW (OUTPATIENT)
Age: 42
End: 2022-04-14

## 2022-04-14 ENCOUNTER — APPOINTMENT (OUTPATIENT)
Dept: OBGYN | Facility: HOSPITAL | Age: 42
End: 2022-04-14

## 2022-04-14 ENCOUNTER — OUTPATIENT (OUTPATIENT)
Dept: OUTPATIENT SERVICES | Facility: HOSPITAL | Age: 42
LOS: 1 days | End: 2022-04-14

## 2022-04-14 VITALS
HEIGHT: 63 IN | HEART RATE: 86 BPM | WEIGHT: 165 LBS | BODY MASS INDEX: 29.23 KG/M2 | SYSTOLIC BLOOD PRESSURE: 118 MMHG | DIASTOLIC BLOOD PRESSURE: 71 MMHG

## 2022-04-14 DIAGNOSIS — Z98.890 OTHER SPECIFIED POSTPROCEDURAL STATES: Chronic | ICD-10-CM

## 2022-04-14 LAB
A1C WITH ESTIMATED AVERAGE GLUCOSE RESULT: 5.2 % — SIGNIFICANT CHANGE UP (ref 4–5.6)
ALBUMIN SERPL ELPH-MCNC: 4.8 G/DL — SIGNIFICANT CHANGE UP (ref 3.3–5)
ALP SERPL-CCNC: 83 U/L — SIGNIFICANT CHANGE UP (ref 40–120)
ALT FLD-CCNC: 27 U/L — SIGNIFICANT CHANGE UP (ref 4–33)
ANION GAP SERPL CALC-SCNC: 10 MMOL/L — SIGNIFICANT CHANGE UP (ref 7–14)
AST SERPL-CCNC: 21 U/L — SIGNIFICANT CHANGE UP (ref 4–32)
BASOPHILS # BLD AUTO: 0.01 K/UL — SIGNIFICANT CHANGE UP (ref 0–0.2)
BASOPHILS NFR BLD AUTO: 0.1 % — SIGNIFICANT CHANGE UP (ref 0–2)
BILIRUB SERPL-MCNC: 0.4 MG/DL — SIGNIFICANT CHANGE UP (ref 0.2–1.2)
BUN SERPL-MCNC: 12 MG/DL — SIGNIFICANT CHANGE UP (ref 7–23)
CALCIUM SERPL-MCNC: 9.6 MG/DL — SIGNIFICANT CHANGE UP (ref 8.4–10.5)
CHLORIDE SERPL-SCNC: 99 MMOL/L — SIGNIFICANT CHANGE UP (ref 98–107)
CO2 SERPL-SCNC: 26 MMOL/L — SIGNIFICANT CHANGE UP (ref 22–31)
CREAT SERPL-MCNC: 0.69 MG/DL — SIGNIFICANT CHANGE UP (ref 0.5–1.3)
EGFR: 113 ML/MIN/1.73M2 — SIGNIFICANT CHANGE UP
EOSINOPHIL # BLD AUTO: 0.11 K/UL — SIGNIFICANT CHANGE UP (ref 0–0.5)
EOSINOPHIL NFR BLD AUTO: 1.6 % — SIGNIFICANT CHANGE UP (ref 0–6)
ESTIMATED AVERAGE GLUCOSE: 103 — SIGNIFICANT CHANGE UP
GLUCOSE SERPL-MCNC: 78 MG/DL — SIGNIFICANT CHANGE UP (ref 70–99)
HBV SURFACE AG SER-ACNC: SIGNIFICANT CHANGE UP
HCT VFR BLD CALC: 44.2 % — SIGNIFICANT CHANGE UP (ref 34.5–45)
HCV AB S/CO SERPL IA: 0.08 S/CO — SIGNIFICANT CHANGE UP (ref 0–0.99)
HCV AB SERPL-IMP: SIGNIFICANT CHANGE UP
HGB BLD-MCNC: 14.4 G/DL — SIGNIFICANT CHANGE UP (ref 11.5–15.5)
HIV 1+2 AB+HIV1 P24 AG SERPL QL IA: SIGNIFICANT CHANGE UP
IANC: 4.75 K/UL — SIGNIFICANT CHANGE UP (ref 1.8–7.4)
IMM GRANULOCYTES NFR BLD AUTO: 0.3 % — SIGNIFICANT CHANGE UP (ref 0–1.5)
LYMPHOCYTES # BLD AUTO: 1.5 K/UL — SIGNIFICANT CHANGE UP (ref 1–3.3)
LYMPHOCYTES # BLD AUTO: 21.3 % — SIGNIFICANT CHANGE UP (ref 13–44)
MCHC RBC-ENTMCNC: 30.6 PG — SIGNIFICANT CHANGE UP (ref 27–34)
MCHC RBC-ENTMCNC: 32.6 GM/DL — SIGNIFICANT CHANGE UP (ref 32–36)
MCV RBC AUTO: 94 FL — SIGNIFICANT CHANGE UP (ref 80–100)
MONOCYTES # BLD AUTO: 0.64 K/UL — SIGNIFICANT CHANGE UP (ref 0–0.9)
MONOCYTES NFR BLD AUTO: 9.1 % — SIGNIFICANT CHANGE UP (ref 2–14)
NEUTROPHILS # BLD AUTO: 4.75 K/UL — SIGNIFICANT CHANGE UP (ref 1.8–7.4)
NEUTROPHILS NFR BLD AUTO: 67.6 % — SIGNIFICANT CHANGE UP (ref 43–77)
NRBC # BLD: 0 /100 WBCS — SIGNIFICANT CHANGE UP
NRBC # FLD: 0 K/UL — SIGNIFICANT CHANGE UP
PLATELET # BLD AUTO: 323 K/UL — SIGNIFICANT CHANGE UP (ref 150–400)
POTASSIUM SERPL-MCNC: 3.8 MMOL/L — SIGNIFICANT CHANGE UP (ref 3.5–5.3)
POTASSIUM SERPL-SCNC: 3.8 MMOL/L — SIGNIFICANT CHANGE UP (ref 3.5–5.3)
PROT SERPL-MCNC: 8.1 G/DL — SIGNIFICANT CHANGE UP (ref 6–8.3)
RBC # BLD: 4.7 M/UL — SIGNIFICANT CHANGE UP (ref 3.8–5.2)
RBC # FLD: 13.4 % — SIGNIFICANT CHANGE UP (ref 10.3–14.5)
SODIUM SERPL-SCNC: 135 MMOL/L — SIGNIFICANT CHANGE UP (ref 135–145)
T4 FREE SERPL-MCNC: 1 NG/DL — SIGNIFICANT CHANGE UP (ref 0.9–1.8)
TSH SERPL-MCNC: 0.64 UIU/ML — SIGNIFICANT CHANGE UP (ref 0.27–4.2)
WBC # BLD: 7.03 K/UL — SIGNIFICANT CHANGE UP (ref 3.8–10.5)
WBC # FLD AUTO: 7.03 K/UL — SIGNIFICANT CHANGE UP (ref 3.8–10.5)

## 2022-04-14 NOTE — REVIEW OF SYSTEMS
[Abdominal Pain] : abdominal pain [Nipple Changes] : nipple changes [Nipple Discharge] : nipple discharge [Negative] : Heme/Lymph [Frequency] : no frequency [Dysuria] : no dysuria [Breast Pain] : no breast pain [Breast Lump] : no breast lump [FreeTextEntry8] : foul smelling urine

## 2022-04-14 NOTE — DISCUSSION/SUMMARY
[FreeTextEntry1] : 40 yo  LMP 3/27/22 presents for GYN annual. \par \par 1. GYN annual\par -pap with HPV today\par -STI testing inc oral/rectal gc/ct\par -routine labs\par -diagnostic mammogram ordered for c/o nipple discharge tho likely superficial from piercings\par \par 2. Umbilical hernia\par -gen surg referral submitted\par \par 3. Urinary sx\par -culture collected today\par \par 4. Family planning\par -counseled briefly on options, pt considering copper IUD\par -gc/ct collected today\par -pt can return at any time for IUD, emphasized no unprotected intercourse for 2 weeks prior\par \par RTC prn for IUD

## 2022-04-14 NOTE — HISTORY OF PRESENT ILLNESS
[Currently Active] : currently active [Men] : men [Vaginal] : vaginal [Oral] : oral [Anal] : anal [Yes] : Yes [No] : No [Patient would like to be screened for STIs] : Patient would like to be screened for STIs [FreeTextEntry1] : 42 yo  LMP 3/27/22 presents for GYN annual. She has not been here since 12/2020. Since that time she has had an interval pregnancy 7/2021 which resulted in an IUFD at "6 months" for which she had a surgical procedure. She stated she was doing normal prenatal care at an outside office and went to her scheduled appt and was found not to have a heartbeat. She was not given any indication of the reason for the demise. Has been feeling well since. States that pregnancy was fathered by a different partner than her current partner. She is sexually active with one monogamous male partner currently. She does not use birth control but is considering starting a method as her son's girlfriend is pregnant currently and she does not want to become pregnant now. She does want to have another child in the future. \par Today she c/o worsening pain around her bellybutton at the site of an umbilical hernia repair few years ago. She notices a small bulge but is more bothered by pain which can be 9/10 at its worst with exertion. Pain relieved by tylenol and NSAID. \par She also c/o a foul smell to her urine for the last month. Denies dysuria, frequency, incomplete emptying. \par She also has bl nipple piercings and states she will occasionally see a mucusy discharge from her nipple. She believes it is fron the piercings themselves. She denies any bloody or milky discharge, states discharge will dry up and become crusted. \par \par \par

## 2022-04-14 NOTE — PHYSICAL EXAM

## 2022-04-15 DIAGNOSIS — Z30.09 ENCOUNTER FOR OTHER GENERAL COUNSELING AND ADVICE ON CONTRACEPTION: ICD-10-CM

## 2022-04-15 DIAGNOSIS — R82.90 UNSPECIFIED ABNORMAL FINDINGS IN URINE: ICD-10-CM

## 2022-04-15 DIAGNOSIS — Z01.419 ENCOUNTER FOR GYNECOLOGICAL EXAMINATION (GENERAL) (ROUTINE) WITHOUT ABNORMAL FINDINGS: ICD-10-CM

## 2022-04-15 DIAGNOSIS — K42.9 UMBILICAL HERNIA WITHOUT OBSTRUCTION OR GANGRENE: ICD-10-CM

## 2022-04-15 LAB
C TRACH RRNA SPEC QL NAA+PROBE: SIGNIFICANT CHANGE UP
C TRACH RRNA SPEC QL NAA+PROBE: SIGNIFICANT CHANGE UP
HPV HIGH+LOW RISK DNA PNL CVX: SIGNIFICANT CHANGE UP
SPECIMEN SOURCE: SIGNIFICANT CHANGE UP
SPECIMEN SOURCE: SIGNIFICANT CHANGE UP
T PALLIDUM AB TITR SER: NEGATIVE — SIGNIFICANT CHANGE UP

## 2022-04-19 LAB — CYTOLOGY SPEC DOC CYTO: SIGNIFICANT CHANGE UP

## 2022-05-24 ENCOUNTER — APPOINTMENT (OUTPATIENT)
Dept: SURGERY | Facility: HOSPITAL | Age: 42
End: 2022-05-24

## 2022-05-24 ENCOUNTER — OUTPATIENT (OUTPATIENT)
Dept: OUTPATIENT SERVICES | Facility: HOSPITAL | Age: 42
LOS: 1 days | End: 2022-05-24

## 2022-05-24 VITALS
SYSTOLIC BLOOD PRESSURE: 109 MMHG | WEIGHT: 166.4 LBS | HEIGHT: 63 IN | HEART RATE: 90 BPM | BODY MASS INDEX: 29.48 KG/M2 | DIASTOLIC BLOOD PRESSURE: 63 MMHG

## 2022-05-24 DIAGNOSIS — K42.9 UMBILICAL HERNIA W/OUT OBSTRUCTION OR GANGRENE: ICD-10-CM

## 2022-05-24 DIAGNOSIS — Z98.890 OTHER SPECIFIED POSTPROCEDURAL STATES: Chronic | ICD-10-CM

## 2022-05-25 DIAGNOSIS — K42.9 UMBILICAL HERNIA WITHOUT OBSTRUCTION OR GANGRENE: ICD-10-CM

## 2022-08-12 ENCOUNTER — APPOINTMENT (OUTPATIENT)
Dept: SURGERY | Facility: HOSPITAL | Age: 42
End: 2022-08-12

## 2022-10-25 ENCOUNTER — APPOINTMENT (OUTPATIENT)
Dept: SURGERY | Facility: HOSPITAL | Age: 42
End: 2022-10-25

## 2022-10-25 ENCOUNTER — OUTPATIENT (OUTPATIENT)
Dept: OUTPATIENT SERVICES | Facility: HOSPITAL | Age: 42
LOS: 1 days | End: 2022-10-25

## 2022-10-25 VITALS
BODY MASS INDEX: 29.41 KG/M2 | DIASTOLIC BLOOD PRESSURE: 73 MMHG | WEIGHT: 166 LBS | TEMPERATURE: 98.6 F | HEIGHT: 63 IN | HEART RATE: 76 BPM | SYSTOLIC BLOOD PRESSURE: 114 MMHG

## 2022-10-25 DIAGNOSIS — K42.9 UMBILICAL HERNIA WITHOUT OBSTRUCTION OR GANGRENE: ICD-10-CM

## 2022-10-25 DIAGNOSIS — Z98.890 OTHER SPECIFIED POSTPROCEDURAL STATES: Chronic | ICD-10-CM

## 2022-10-25 NOTE — HISTORY OF PRESENT ILLNESS
[de-identified] : 42F w/ no PMHx and PSH of laparoscopic appendectomy with umbilical hernia repair in 2019 presenting for evaluation for recurrent umbilical hernia. patient was seen recently in this clinic in May 2022 for surgical scheduling for her umbilical hernia repair. However, patient admits to lost to follow-up since she wanted to ensure she was not pregnant before pursuing elective surgery. Patient states her umbilical hernia intermittently bothers her and causes pain when she lifts heavy or is active. Her hernia comes "in and out" intermittently. Denies any nausea, vomiting, constipation or obstipation.

## 2022-10-25 NOTE — HISTORY OF PRESENT ILLNESS
[de-identified] : 42F w/ no PMHx and PSH of laparoscopic appendectomy with umbilical hernia repair in 2019 presenting for evaluation for recurrent umbilical hernia. patient was seen recently in this clinic in May 2022 for surgical scheduling for her umbilical hernia repair. However, patient admits to lost to follow-up since she wanted to ensure she was not pregnant before pursuing elective surgery. Patient states her umbilical hernia intermittently bothers her and causes pain when she lifts heavy or is active. Her hernia comes "in and out" intermittently. Denies any nausea, vomiting, constipation or obstipation.

## 2022-10-25 NOTE — PHYSICAL EXAM
[Normal Thyroid] : the thyroid was normal [Normal Breath Sounds] : Normal breath sounds [Normal Heart Sounds] : normal heart sounds [Normal Rate and Rhythm] : normal rate and rhythm [JVD] : no jugular venous distention  [de-identified] : supple, no JVD [de-identified] : no rashes noted [Abdominal Masses] : No abdominal masses [Abdomen Tenderness] : ~T ~M No abdominal tenderness [de-identified] : AA)x3 [de-identified] : Reducible umbilical hernia, nontender

## 2022-10-25 NOTE — PLAN
[FreeTextEntry1] : 42F presenting with recurrent umbilical hernia.\par \par PLAN:\par - Will schedule elective laparoscopic umbilical hernia repair with mesh\par - Risks and benefits of open vs laparoscopic repair explained to patient along with the use of mesh and possibility of recurrence.\par - Patient will need PST.

## 2022-10-25 NOTE — PHYSICAL EXAM
[Normal Thyroid] : the thyroid was normal [Normal Breath Sounds] : Normal breath sounds [Normal Heart Sounds] : normal heart sounds [Normal Rate and Rhythm] : normal rate and rhythm [JVD] : no jugular venous distention  [de-identified] : supple, no JVD [de-identified] : no rashes noted [Abdominal Masses] : No abdominal masses [Abdomen Tenderness] : ~T ~M No abdominal tenderness [de-identified] : AA)x3 [de-identified] : Reducible umbilical hernia, nontender

## 2022-10-25 NOTE — HISTORY OF PRESENT ILLNESS
[de-identified] : 42F w/ no PMHx and PSH of laparoscopic appendectomy with umbilical hernia repair in 2019 presenting for evaluation for recurrent umbilical hernia. patient was seen recently in this clinic in May 2022 for surgical scheduling for her umbilical hernia repair. However, patient admits to lost to follow-up since she wanted to ensure she was not pregnant before pursuing elective surgery. Patient states her umbilical hernia intermittently bothers her and causes pain when she lifts heavy or is active. Her hernia comes "in and out" intermittently. Denies any nausea, vomiting, constipation or obstipation.

## 2022-10-25 NOTE — REASON FOR VISIT
[Initial Eval - Existing Diagnosis] : an initial evaluation of an existing diagnosis [Follow-Up] : a follow-up visit

## 2022-10-25 NOTE — PHYSICAL EXAM
[Abdominal Masses] : No abdominal masses [Abdomen Tenderness] : ~T ~M No abdominal tenderness [de-identified] : AAOx3 [de-identified] : Reducible umbilical hernia, nontender

## 2022-10-25 NOTE — PLAN
[FreeTextEntry1] : 42F presenting with recurrent umbilical hernia.\par \par PLAN:\par - Will schedule elective laparoscopic umbilical hernia repair with mesh\par - Risks and benefits of open vs laparoscopic repair explained to patient along with the use of mesh and possibility of recurrence.\par - Patient will need PST

## 2022-10-25 NOTE — ASSESSMENT
[FreeTextEntry1] : 42 year old  female with no pertinent past medical history and past surgical history of laparoscopic appendectomy for acute appendicitis in 2019 with umbilical hernia repair (no mesh) presenting due to recurrence of umbilical hernia causing significant pain and discomfort with exertion and standing.\par - Recommended elective surgery for repair of umbilical hernia with mesh\par - Discussed risks and benefits of open vs. laparoscopic approach for surgery\par - Patient agreed and would like to schedule laparoscopic umbilical hernia repair with mesh placement\par - Plan for pre surgical testing prior to OR date hopefully in June 2022\par - Office will reach out to schedule\par - Patient agreed to hold off on pregnancy plans until after recovery from surgery\par - Patient agreed to not smoke as risks of poor wound healing has been discussed.

## 2022-11-04 NOTE — ED PROVIDER NOTE - CPE EDP CARDIAC NORM
normal... Consent (Near Eyelid Margin)/Introductory Paragraph: The rationale for Mohs was explained to the patient and consent was obtained. The risks, benefits and alternatives to therapy were discussed in detail. Specifically, the risks of ectropion or eyelid deformity, infection, scarring, bleeding, prolonged wound healing, incomplete removal, allergy to anesthesia, nerve injury and recurrence were addressed. Prior to the procedure, the treatment site was clearly identified and confirmed by the patient. All components of Universal Protocol/PAUSE Rule completed.

## 2022-12-08 ENCOUNTER — OUTPATIENT (OUTPATIENT)
Dept: OUTPATIENT SERVICES | Facility: HOSPITAL | Age: 42
LOS: 1 days | End: 2022-12-08

## 2022-12-08 VITALS
WEIGHT: 167.99 LBS | HEART RATE: 70 BPM | RESPIRATION RATE: 16 BRPM | TEMPERATURE: 97 F | OXYGEN SATURATION: 98 % | DIASTOLIC BLOOD PRESSURE: 81 MMHG | SYSTOLIC BLOOD PRESSURE: 118 MMHG | HEIGHT: 63 IN

## 2022-12-08 DIAGNOSIS — K42.9 UMBILICAL HERNIA WITHOUT OBSTRUCTION OR GANGRENE: ICD-10-CM

## 2022-12-08 DIAGNOSIS — Z98.890 OTHER SPECIFIED POSTPROCEDURAL STATES: Chronic | ICD-10-CM

## 2022-12-08 DIAGNOSIS — Z90.49 ACQUIRED ABSENCE OF OTHER SPECIFIED PARTS OF DIGESTIVE TRACT: Chronic | ICD-10-CM

## 2022-12-08 LAB
HCG SERPL-ACNC: <5 MIU/ML — SIGNIFICANT CHANGE UP
HCT VFR BLD CALC: 40.3 % — SIGNIFICANT CHANGE UP (ref 34.5–45)
HGB BLD-MCNC: 12.9 G/DL — SIGNIFICANT CHANGE UP (ref 11.5–15.5)
MCHC RBC-ENTMCNC: 30.4 PG — SIGNIFICANT CHANGE UP (ref 27–34)
MCHC RBC-ENTMCNC: 32 GM/DL — SIGNIFICANT CHANGE UP (ref 32–36)
MCV RBC AUTO: 95 FL — SIGNIFICANT CHANGE UP (ref 80–100)
NRBC # BLD: 0 /100 WBCS — SIGNIFICANT CHANGE UP (ref 0–0)
NRBC # FLD: 0 K/UL — SIGNIFICANT CHANGE UP (ref 0–0)
PLATELET # BLD AUTO: 376 K/UL — SIGNIFICANT CHANGE UP (ref 150–400)
RBC # BLD: 4.24 M/UL — SIGNIFICANT CHANGE UP (ref 3.8–5.2)
RBC # FLD: 12.2 % — SIGNIFICANT CHANGE UP (ref 10.3–14.5)
WBC # BLD: 9.59 K/UL — SIGNIFICANT CHANGE UP (ref 3.8–10.5)
WBC # FLD AUTO: 9.59 K/UL — SIGNIFICANT CHANGE UP (ref 3.8–10.5)

## 2022-12-08 RX ORDER — SODIUM CHLORIDE 9 MG/ML
1000 INJECTION, SOLUTION INTRAVENOUS
Refills: 0 | Status: DISCONTINUED | OUTPATIENT
Start: 2022-12-12 | End: 2022-12-27

## 2022-12-08 NOTE — H&P PST ADULT - GIT GEN HX ROS MEA POS PC
when full; umbilical hernia, bloated feeling/abdominal pain when full; bloated feeling/abdominal pain

## 2022-12-08 NOTE — H&P PST ADULT - PROBLEM SELECTOR PLAN 1
Schedule for laparoscopic umbilical hernia repair with mesh placement on 12/12/22. Pre op instructions, famotidine, chlorhexidine gluconate soap given and explained. Pt instructed to bring urine specimen on day of surgery, urine cup given to pt who verbalized understanding.

## 2022-12-08 NOTE — H&P PST ADULT - NSICDXPASTSURGICALHX_GEN_ALL_CORE_FT
PAST SURGICAL HISTORY:  H/O excision of ganglion cyst Right wrist    H/O hernia repair     History of laparoscopic appendectomy      PAST SURGICAL HISTORY:  H/O excision of ganglion cyst Right wrist    H/O hernia repair     History of laparoscopic appendectomy     S/P dilation and curettage

## 2022-12-08 NOTE — H&P PST ADULT - HISTORY OF PRESENT ILLNESS
41 y/o F presents   x2 years ago 41 y/o F presents for pre op evaluation with pre op diagnosis: umbilical hernia without obstruction or gangrene. Now schedule for laparoscopic umbilical hernia repair with mesh placement.

## 2022-12-08 NOTE — H&P PST ADULT - MUSCULOSKELETAL
negative normal/ROM intact/no joint warmth/normal gait/strength 5/5 bilateral upper extremities/strength 5/5 bilateral lower extremities

## 2022-12-09 NOTE — ASU PATIENT PROFILE, ADULT - NSCAFFEAMTFREQ_GEN_ALL_CORE_SD
[Fully active, able to carry on all pre-disease performance without restriction] : Status 0 - Fully active, able to carry on all pre-disease performance without restriction [Normal] : affect appropriate [de-identified] : L breast with some edema, bruising, mild hematoma at surgical site. very dense breast tissue, no elijah masses or adenopathy palpated b/l 1-2 cups/cans per day

## 2022-12-09 NOTE — ASU PATIENT PROFILE, ADULT - TEACHING/LEARNING FACTORS IMPACT ABILITY TO LEARN
Hypertensive Medications; PROTOCOL FAILED. PLEASE ADVISE ON REFILL. THANKS.   Protocol Criteria:  · Appointment scheduled in the past 6 months or in the next 3 months  · BMP or CMP in the past 12 months  · Creatinine result < 2  Recent Visits       Provider none

## 2022-12-09 NOTE — ASU PATIENT PROFILE, ADULT - NSICDXPASTSURGICALHX_GEN_ALL_CORE_FT
PAST SURGICAL HISTORY:  H/O excision of ganglion cyst Right wrist    H/O hernia repair     History of laparoscopic appendectomy     S/P dilation and curettage

## 2022-12-11 ENCOUNTER — TRANSCRIPTION ENCOUNTER (OUTPATIENT)
Age: 42
End: 2022-12-11

## 2022-12-12 ENCOUNTER — APPOINTMENT (OUTPATIENT)
Dept: SURGERY | Facility: HOSPITAL | Age: 42
End: 2022-12-12

## 2022-12-12 ENCOUNTER — TRANSCRIPTION ENCOUNTER (OUTPATIENT)
Age: 42
End: 2022-12-12

## 2022-12-12 ENCOUNTER — OUTPATIENT (OUTPATIENT)
Dept: OUTPATIENT SERVICES | Facility: HOSPITAL | Age: 42
LOS: 1 days | Discharge: HOME | End: 2022-12-12

## 2022-12-12 VITALS
DIASTOLIC BLOOD PRESSURE: 75 MMHG | HEART RATE: 64 BPM | SYSTOLIC BLOOD PRESSURE: 116 MMHG | RESPIRATION RATE: 20 BRPM | OXYGEN SATURATION: 97 %

## 2022-12-12 VITALS
OXYGEN SATURATION: 98 % | HEART RATE: 74 BPM | TEMPERATURE: 96 F | SYSTOLIC BLOOD PRESSURE: 111 MMHG | RESPIRATION RATE: 16 BRPM | DIASTOLIC BLOOD PRESSURE: 68 MMHG | WEIGHT: 167.99 LBS | HEIGHT: 63 IN

## 2022-12-12 DIAGNOSIS — Z98.890 OTHER SPECIFIED POSTPROCEDURAL STATES: Chronic | ICD-10-CM

## 2022-12-12 DIAGNOSIS — Z90.49 ACQUIRED ABSENCE OF OTHER SPECIFIED PARTS OF DIGESTIVE TRACT: Chronic | ICD-10-CM

## 2022-12-12 DIAGNOSIS — K42.9 UMBILICAL HERNIA WITHOUT OBSTRUCTION OR GANGRENE: ICD-10-CM

## 2022-12-12 LAB — HCG UR QL: NEGATIVE — SIGNIFICANT CHANGE UP

## 2022-12-12 PROCEDURE — 49652: CPT | Mod: GC

## 2022-12-12 DEVICE — MESH HERNIA VENTRAL VENTRALIGHT ST ELLIPSE 4 X 6": Type: IMPLANTABLE DEVICE | Status: FUNCTIONAL

## 2022-12-12 DEVICE — COVIDIEN PROTACK FIXATION DEVICE: Type: IMPLANTABLE DEVICE | Status: FUNCTIONAL

## 2022-12-12 RX ORDER — ONDANSETRON 8 MG/1
4 TABLET, FILM COATED ORAL ONCE
Refills: 0 | Status: DISCONTINUED | OUTPATIENT
Start: 2022-12-12 | End: 2022-12-27

## 2022-12-12 RX ORDER — OXYCODONE HYDROCHLORIDE 5 MG/1
1 TABLET ORAL
Qty: 12 | Refills: 0
Start: 2022-12-12

## 2022-12-12 RX ORDER — FENTANYL CITRATE 50 UG/ML
25 INJECTION INTRAVENOUS
Refills: 0 | Status: DISCONTINUED | OUTPATIENT
Start: 2022-12-12 | End: 2022-12-12

## 2022-12-12 RX ORDER — OXYCODONE HYDROCHLORIDE 5 MG/1
1 TABLET ORAL
Qty: 12 | Refills: 0
Start: 2022-12-12 | End: 2022-12-14

## 2022-12-12 RX ORDER — FENTANYL CITRATE 50 UG/ML
50 INJECTION INTRAVENOUS
Refills: 0 | Status: DISCONTINUED | OUTPATIENT
Start: 2022-12-12 | End: 2022-12-12

## 2022-12-12 RX ORDER — OXYCODONE HYDROCHLORIDE 5 MG/1
5 TABLET ORAL ONCE
Refills: 0 | Status: DISCONTINUED | OUTPATIENT
Start: 2022-12-12 | End: 2022-12-12

## 2022-12-12 RX ADMIN — OXYCODONE HYDROCHLORIDE 5 MILLIGRAM(S): 5 TABLET ORAL at 20:30

## 2022-12-12 RX ADMIN — FENTANYL CITRATE 25 MICROGRAM(S): 50 INJECTION INTRAVENOUS at 20:45

## 2022-12-12 RX ADMIN — FENTANYL CITRATE 25 MICROGRAM(S): 50 INJECTION INTRAVENOUS at 19:40

## 2022-12-12 RX ADMIN — FENTANYL CITRATE 25 MICROGRAM(S): 50 INJECTION INTRAVENOUS at 19:25

## 2022-12-12 RX ADMIN — FENTANYL CITRATE 25 MICROGRAM(S): 50 INJECTION INTRAVENOUS at 21:00

## 2022-12-12 RX ADMIN — OXYCODONE HYDROCHLORIDE 5 MILLIGRAM(S): 5 TABLET ORAL at 20:15

## 2022-12-12 NOTE — ASU DISCHARGE PLAN (ADULT/PEDIATRIC) - NS MD DC FALL RISK RISK
For information on Fall & Injury Prevention, visit: [email or web address] OR  [email or web address] OR  https://www.cdc.gov/steadi/patient.html

## 2022-12-12 NOTE — ASU DISCHARGE PLAN (ADULT/PEDIATRIC) - NURSING INSTRUCTIONS
You received IV Tylenol for pain management at 5:45PM. Please DO NOT take any Tylenol (Acetaminophen) containing products, such as Vicodin, Percocet, Excedrin, and cold medications for the next 6 hours (until 11.:45PM). DO NOT TAKE MORE THAN 4000 MG OF TYLENOL in a 24 hour period.  You received IV Toradol for pain management at 5:45PM. Please DO NOT take Motrin/Ibuprofen/Advil/Aleve/NSAIDs (Non-Steroidal Anti-Inflammatory Drugs) for the next 6 hours (until 11:45PM).  When taking pain meds - take with food and know it may cause constipation and nausea - Do NOT drive while on narcotics.

## 2022-12-12 NOTE — ASU DISCHARGE PLAN (ADULT/PEDIATRIC) - CARE PROVIDER_API CALL
Minh Castro)  Surgery; Surgical Critical Care  [address] [place] [address]  Phone: [phone]  Fax: [phone]  Follow Up Time: 2 weeks

## 2022-12-13 RX ORDER — OXYCODONE HYDROCHLORIDE 5 MG/1
1 TABLET ORAL
Qty: 12 | Refills: 0
Start: 2022-12-13

## 2023-01-10 ENCOUNTER — APPOINTMENT (OUTPATIENT)
Dept: SURGERY | Facility: HOSPITAL | Age: 43
End: 2023-01-10

## 2023-01-10 ENCOUNTER — OUTPATIENT (OUTPATIENT)
Dept: OUTPATIENT SERVICES | Facility: HOSPITAL | Age: 43
LOS: 1 days | End: 2023-01-10

## 2023-01-10 VITALS
WEIGHT: 171 LBS | TEMPERATURE: 98.2 F | SYSTOLIC BLOOD PRESSURE: 112 MMHG | BODY MASS INDEX: 30.3 KG/M2 | DIASTOLIC BLOOD PRESSURE: 79 MMHG | HEART RATE: 79 BPM | HEIGHT: 63 IN

## 2023-01-10 DIAGNOSIS — Z98.890 OTHER SPECIFIED POSTPROCEDURAL STATES: Chronic | ICD-10-CM

## 2023-01-10 DIAGNOSIS — Z90.49 ACQUIRED ABSENCE OF OTHER SPECIFIED PARTS OF DIGESTIVE TRACT: Chronic | ICD-10-CM

## 2023-01-10 NOTE — HISTORY OF PRESENT ILLNESS
[de-identified] : 42F w/ no PMHx and PSH of laparoscopic appendectomy with umbilical hernia repair in 2019 presenting for one month follow up s/p umbilical hernia repair with mesh for recurrent umbilical hernia Patient states her umbilical hernia intermittently bothers her and causes pain with sudden movements.  Her post surgical pain comes "in and out" intermittently. Pt denies any nausea, vomiting, constipation or obstipation. Pt is now one month s/p umbilical hernia repair. Pt reports 8/10 pain while movements following the repair along with a sharp/throbbing pain with deep inspiration. Pt reports pain is improved when laying on her back. Pt denies any heavy lifting or strenuous activity.

## 2023-01-10 NOTE — VITALS
[Sharp] : sharp [Aching] : aching [Stabbing] : stabbing [FreeTextEntry3] : umbilicus and flanks [FreeTextEntry1] : laying on back [FreeTextEntry2] : intra-abdominal pressure

## 2023-01-10 NOTE — PHYSICAL EXAM
[Normal Breath Sounds] : Normal breath sounds [Normal Heart Sounds] : normal heart sounds [Abdomen Tenderness] : ~T ~M Abdominal tenderness [No HSM] : no hepatosplenomegaly [Calm] : calm [JVD] : no jugular venous distention  [Abdominal Masses] : No abdominal masses [de-identified] : NAD [de-identified] : TTP around umbilicus and flank

## 2023-01-10 NOTE — REVIEW OF SYSTEMS
[Abdominal Pain] : abdominal pain [Fever] : no fever [Chills] : no chills [Chest Pain] : no chest pain [Vomiting] : no vomiting [Constipation] : no constipation [Diarrhea] : no diarrhea [Dysuria] : no dysuria [Limb Pain] : no limb pain

## 2023-01-12 DIAGNOSIS — Z09 ENCOUNTER FOR FOLLOW-UP EXAMINATION AFTER COMPLETED TREATMENT FOR CONDITIONS OTHER THAN MALIGNANT NEOPLASM: ICD-10-CM

## 2023-02-28 ENCOUNTER — RESULT REVIEW (OUTPATIENT)
Age: 43
End: 2023-02-28

## 2023-02-28 ENCOUNTER — OUTPATIENT (OUTPATIENT)
Dept: OUTPATIENT SERVICES | Facility: HOSPITAL | Age: 43
LOS: 1 days | End: 2023-02-28

## 2023-02-28 ENCOUNTER — APPOINTMENT (OUTPATIENT)
Dept: SURGERY | Facility: HOSPITAL | Age: 43
End: 2023-02-28

## 2023-02-28 VITALS
DIASTOLIC BLOOD PRESSURE: 69 MMHG | WEIGHT: 177 LBS | HEIGHT: 63 IN | HEART RATE: 77 BPM | TEMPERATURE: 97.9 F | SYSTOLIC BLOOD PRESSURE: 120 MMHG | BODY MASS INDEX: 31.36 KG/M2

## 2023-02-28 DIAGNOSIS — Z98.890 OTHER SPECIFIED POSTPROCEDURAL STATES: Chronic | ICD-10-CM

## 2023-02-28 DIAGNOSIS — R10.9 UNSPECIFIED ABDOMINAL PAIN: ICD-10-CM

## 2023-02-28 DIAGNOSIS — Z90.49 ACQUIRED ABSENCE OF OTHER SPECIFIED PARTS OF DIGESTIVE TRACT: Chronic | ICD-10-CM

## 2023-03-01 DIAGNOSIS — Z09 ENCOUNTER FOR FOLLOW-UP EXAMINATION AFTER COMPLETED TREATMENT FOR CONDITIONS OTHER THAN MALIGNANT NEOPLASM: ICD-10-CM

## 2023-03-09 ENCOUNTER — APPOINTMENT (OUTPATIENT)
Dept: OBGYN | Facility: HOSPITAL | Age: 43
End: 2023-03-09

## 2023-03-10 ENCOUNTER — RESULT REVIEW (OUTPATIENT)
Age: 43
End: 2023-03-10

## 2023-03-10 ENCOUNTER — APPOINTMENT (OUTPATIENT)
Dept: OBGYN | Facility: HOSPITAL | Age: 43
End: 2023-03-10

## 2023-03-10 ENCOUNTER — OUTPATIENT (OUTPATIENT)
Dept: OUTPATIENT SERVICES | Facility: HOSPITAL | Age: 43
LOS: 1 days | End: 2023-03-10

## 2023-03-10 VITALS
HEIGHT: 63 IN | TEMPERATURE: 97 F | HEART RATE: 94 BPM | BODY MASS INDEX: 31.01 KG/M2 | WEIGHT: 175 LBS | SYSTOLIC BLOOD PRESSURE: 120 MMHG | DIASTOLIC BLOOD PRESSURE: 75 MMHG

## 2023-03-10 DIAGNOSIS — R82.90 UNSPECIFIED ABNORMAL FINDINGS IN URINE: ICD-10-CM

## 2023-03-10 DIAGNOSIS — Z90.49 ACQUIRED ABSENCE OF OTHER SPECIFIED PARTS OF DIGESTIVE TRACT: Chronic | ICD-10-CM

## 2023-03-10 DIAGNOSIS — Z32.00 ENCOUNTER FOR PREGNANCY TEST, RESULT UNKNOWN: ICD-10-CM

## 2023-03-10 DIAGNOSIS — Z98.890 OTHER SPECIFIED POSTPROCEDURAL STATES: Chronic | ICD-10-CM

## 2023-03-10 DIAGNOSIS — R10.2 PELVIC AND PERINEAL PAIN: ICD-10-CM

## 2023-03-10 DIAGNOSIS — Z30.012 ENCOUNTER FOR PRESCRIPTION OF EMERGENCY CONTRACEPTION: ICD-10-CM

## 2023-03-10 DIAGNOSIS — Z32.02 ENCOUNTER FOR PREGNANCY TEST, RESULT NEGATIVE: ICD-10-CM

## 2023-03-10 DIAGNOSIS — Z32.01 ENCOUNTER FOR PREGNANCY TEST, RESULT POSITIVE: ICD-10-CM

## 2023-03-10 DIAGNOSIS — Z30.430 ENCOUNTER FOR INSERTION OF INTRAUTERINE CONTRACEPTIVE DEVICE: ICD-10-CM

## 2023-03-10 LAB
A1C WITH ESTIMATED AVERAGE GLUCOSE RESULT: 5.1 % — SIGNIFICANT CHANGE UP (ref 4–5.6)
ALBUMIN SERPL ELPH-MCNC: 4.7 G/DL — SIGNIFICANT CHANGE UP (ref 3.3–5)
ALP SERPL-CCNC: 70 U/L — SIGNIFICANT CHANGE UP (ref 40–120)
ALT FLD-CCNC: 36 U/L — HIGH (ref 4–33)
ANION GAP SERPL CALC-SCNC: 11 MMOL/L — SIGNIFICANT CHANGE UP (ref 7–14)
AST SERPL-CCNC: 25 U/L — SIGNIFICANT CHANGE UP (ref 4–32)
BASOPHILS # BLD AUTO: 0.01 K/UL — SIGNIFICANT CHANGE UP (ref 0–0.2)
BASOPHILS NFR BLD AUTO: 0.1 % — SIGNIFICANT CHANGE UP (ref 0–2)
BILIRUB SERPL-MCNC: 0.7 MG/DL — SIGNIFICANT CHANGE UP (ref 0.2–1.2)
BUN SERPL-MCNC: 12 MG/DL — SIGNIFICANT CHANGE UP (ref 7–23)
C TRACH RRNA SPEC QL NAA+PROBE: SIGNIFICANT CHANGE UP
CALCIUM SERPL-MCNC: 9.3 MG/DL — SIGNIFICANT CHANGE UP (ref 8.4–10.5)
CHLORIDE SERPL-SCNC: 100 MMOL/L — SIGNIFICANT CHANGE UP (ref 98–107)
CO2 SERPL-SCNC: 24 MMOL/L — SIGNIFICANT CHANGE UP (ref 22–31)
CREAT SERPL-MCNC: 0.67 MG/DL — SIGNIFICANT CHANGE UP (ref 0.5–1.3)
EGFR: 113 ML/MIN/1.73M2 — SIGNIFICANT CHANGE UP
EOSINOPHIL # BLD AUTO: 0.14 K/UL — SIGNIFICANT CHANGE UP (ref 0–0.5)
EOSINOPHIL NFR BLD AUTO: 1.4 % — SIGNIFICANT CHANGE UP (ref 0–6)
ESTIMATED AVERAGE GLUCOSE: 100 — SIGNIFICANT CHANGE UP
GLUCOSE SERPL-MCNC: 93 MG/DL — SIGNIFICANT CHANGE UP (ref 70–99)
HBV SURFACE AG SER-ACNC: SIGNIFICANT CHANGE UP
HCT VFR BLD CALC: 42.7 % — SIGNIFICANT CHANGE UP (ref 34.5–45)
HCV AB S/CO SERPL IA: 0.06 S/CO — SIGNIFICANT CHANGE UP (ref 0–0.99)
HCV AB SERPL-IMP: SIGNIFICANT CHANGE UP
HGB BLD-MCNC: 14 G/DL — SIGNIFICANT CHANGE UP (ref 11.5–15.5)
HIV 1+2 AB+HIV1 P24 AG SERPL QL IA: SIGNIFICANT CHANGE UP
IANC: 6.55 K/UL — SIGNIFICANT CHANGE UP (ref 1.8–7.4)
IMM GRANULOCYTES NFR BLD AUTO: 0.4 % — SIGNIFICANT CHANGE UP (ref 0–0.9)
LYMPHOCYTES # BLD AUTO: 2.71 K/UL — SIGNIFICANT CHANGE UP (ref 1–3.3)
LYMPHOCYTES # BLD AUTO: 27.1 % — SIGNIFICANT CHANGE UP (ref 13–44)
MCHC RBC-ENTMCNC: 30.4 PG — SIGNIFICANT CHANGE UP (ref 27–34)
MCHC RBC-ENTMCNC: 32.8 GM/DL — SIGNIFICANT CHANGE UP (ref 32–36)
MCV RBC AUTO: 92.8 FL — SIGNIFICANT CHANGE UP (ref 80–100)
MONOCYTES # BLD AUTO: 0.54 K/UL — SIGNIFICANT CHANGE UP (ref 0–0.9)
MONOCYTES NFR BLD AUTO: 5.4 % — SIGNIFICANT CHANGE UP (ref 2–14)
N GONORRHOEA RRNA SPEC QL NAA+PROBE: SIGNIFICANT CHANGE UP
NEUTROPHILS # BLD AUTO: 6.55 K/UL — SIGNIFICANT CHANGE UP (ref 1.8–7.4)
NEUTROPHILS NFR BLD AUTO: 65.6 % — SIGNIFICANT CHANGE UP (ref 43–77)
NRBC # BLD: 0 /100 WBCS — SIGNIFICANT CHANGE UP (ref 0–0)
NRBC # FLD: 0 K/UL — SIGNIFICANT CHANGE UP (ref 0–0)
PLATELET # BLD AUTO: 338 K/UL — SIGNIFICANT CHANGE UP (ref 150–400)
POTASSIUM SERPL-MCNC: 3.8 MMOL/L — SIGNIFICANT CHANGE UP (ref 3.5–5.3)
POTASSIUM SERPL-SCNC: 3.8 MMOL/L — SIGNIFICANT CHANGE UP (ref 3.5–5.3)
PROT SERPL-MCNC: 8 G/DL — SIGNIFICANT CHANGE UP (ref 6–8.3)
RBC # BLD: 4.6 M/UL — SIGNIFICANT CHANGE UP (ref 3.8–5.2)
RBC # FLD: 12.6 % — SIGNIFICANT CHANGE UP (ref 10.3–14.5)
SODIUM SERPL-SCNC: 135 MMOL/L — SIGNIFICANT CHANGE UP (ref 135–145)
SPECIMEN SOURCE: SIGNIFICANT CHANGE UP
TSH SERPL-MCNC: 0.89 UIU/ML — SIGNIFICANT CHANGE UP (ref 0.27–4.2)
WBC # BLD: 9.99 K/UL — SIGNIFICANT CHANGE UP (ref 3.8–10.5)
WBC # FLD AUTO: 9.99 K/UL — SIGNIFICANT CHANGE UP (ref 3.8–10.5)

## 2023-03-10 NOTE — HISTORY OF PRESENT ILLNESS
[Patient reported PAP Smear was normal] : Patient reported PAP Smear was normal [HIV Test offered] : HIV Test offered [Syphilis test offered] : Syphilis test offered [Gonorrhea test offered] : Gonorrhea test offered [Chlamydia test offered] : Chlamydia test offered [Trichomonas test offered] : Trichomonas test offered [HPV test offered] : HPV test offered [Hepatitis B test offered] : Hepatitis B test offered [Hepatitis C test offered] : Hepatitis C test offered [N] : Patient does not use contraception [Y] : Patient is sexually active [TextBox_4] : 41 yo  LMP 2/26/2023 here for routine annual Gyn exam.  Asking for full STD screening including HSV as patient has concerns if her partner may be unfaithful.  Sexually active with one male partner only and not using any birth control.  Has suspicion he may be infertile. However, she really does not want to conceive at this time and now asking bout having IUD placed. [PapSmeardate] : 04/22 [LMPDate] : 02/26/2023 [Currently Active] : currently active [Men] : men [Vaginal] : vaginal [Oral] : oral [No] : No [Patient would like to be screened for STIs] : Patient would like to be screened for STIs

## 2023-03-10 NOTE — DISCUSSION/SUMMARY
[FreeTextEntry1] : Annual Gyn exam\par --last pap done 2022 and due again in 2025\par --GC/Chlam done\par --BV panel done\par --annual blood work including STD blood work and HSV per patient request\par --SBE monthly\par --mammo referral\par --diet and exercise\par \par Family planning\par --patient asking about partner being checked for fertility\par --advised semen analysis with his PCP\par --birth control options discussed\par --patient asking for IUD\par --STD counseling, safe sex and condoms stressed\par Follow up in 2 weeks for IUD insertion\par

## 2023-03-11 LAB
CANDIDA AB TITR SER: SIGNIFICANT CHANGE UP
G VAGINALIS DNA SPEC QL NAA+PROBE: DETECTED
HSV1 IGG SER-ACNC: 32.6 INDEX — HIGH
HSV1 IGG SERPL QL IA: POSITIVE
HSV2 IGG FLD-ACNC: 2.05 INDEX — HIGH
HSV2 IGG SERPL QL IA: POSITIVE
T PALLIDUM AB TITR SER: NEGATIVE — SIGNIFICANT CHANGE UP
T VAGINALIS SPEC QL WET PREP: DETECTED

## 2023-03-13 DIAGNOSIS — Z01.419 ENCOUNTER FOR GYNECOLOGICAL EXAMINATION (GENERAL) (ROUTINE) WITHOUT ABNORMAL FINDINGS: ICD-10-CM

## 2023-03-13 DIAGNOSIS — Z30.09 ENCOUNTER FOR OTHER GENERAL COUNSELING AND ADVICE ON CONTRACEPTION: ICD-10-CM

## 2023-03-13 DIAGNOSIS — Z11.3 ENCOUNTER FOR SCREENING FOR INFECTIONS WITH A PREDOMINANTLY SEXUAL MODE OF TRANSMISSION: ICD-10-CM

## 2023-03-16 ENCOUNTER — TRANSCRIPTION ENCOUNTER (OUTPATIENT)
Age: 43
End: 2023-03-16

## 2023-03-16 RX ORDER — METRONIDAZOLE 500 MG/1
500 TABLET ORAL TWICE DAILY
Qty: 18 | Refills: 0 | Status: ACTIVE | COMMUNITY
Start: 2023-03-16 | End: 1900-01-01

## 2023-03-24 ENCOUNTER — APPOINTMENT (OUTPATIENT)
Dept: OBGYN | Facility: HOSPITAL | Age: 43
End: 2023-03-24

## 2023-03-24 ENCOUNTER — APPOINTMENT (OUTPATIENT)
Dept: CT IMAGING | Facility: IMAGING CENTER | Age: 43
End: 2023-03-24

## 2023-03-29 ENCOUNTER — OUTPATIENT (OUTPATIENT)
Dept: OUTPATIENT SERVICES | Facility: HOSPITAL | Age: 43
LOS: 1 days | End: 2023-03-29
Payer: MEDICAID

## 2023-03-29 ENCOUNTER — APPOINTMENT (OUTPATIENT)
Dept: CT IMAGING | Facility: IMAGING CENTER | Age: 43
End: 2023-03-29
Payer: MEDICAID

## 2023-03-29 DIAGNOSIS — Z98.890 OTHER SPECIFIED POSTPROCEDURAL STATES: Chronic | ICD-10-CM

## 2023-03-29 DIAGNOSIS — Z90.49 ACQUIRED ABSENCE OF OTHER SPECIFIED PARTS OF DIGESTIVE TRACT: Chronic | ICD-10-CM

## 2023-03-29 DIAGNOSIS — R10.32 LEFT LOWER QUADRANT PAIN: ICD-10-CM

## 2023-03-29 PROCEDURE — 74177 CT ABD & PELVIS W/CONTRAST: CPT

## 2023-03-29 PROCEDURE — 74177 CT ABD & PELVIS W/CONTRAST: CPT | Mod: 26

## 2023-04-19 ENCOUNTER — APPOINTMENT (OUTPATIENT)
Dept: OBGYN | Facility: HOSPITAL | Age: 43
End: 2023-04-19

## 2023-04-28 ENCOUNTER — APPOINTMENT (OUTPATIENT)
Dept: OBGYN | Facility: HOSPITAL | Age: 43
End: 2023-04-28

## 2023-04-28 NOTE — DISCHARGE NOTE PROVIDER - NSDCACTIVITY_GEN_ALL_CORE
Received fax from [facility] to have patient schedule appointment with Dr. Spencer prior to cataract surgery on 05/04/23.     Patient recently had an appointment with Dr. Spencer on 04/17/23. Please verify if this is sufficient. If not, we have 05/01/23 at 3pm blocked off for the patient if necessary.    Showering allowed/No restrictions Showering allowed

## 2023-04-28 NOTE — H&P PST ADULT - SPO2 (%)
4/28/2023      Dear Martinez,    Our records show that you may be due for one or more doses of the vaccines recommended by the Centers for Disease Control and Prevention. You may be due for the vaccine(s) listed below:     Pneumococcal vaccine    Please call the office at [phone] to schedule a nurse visit to get updated with this vaccine.     Sincerely,       Ryan Boyd, [place] [address] [facility]  [place] [address]  Dept: [phone]  Dept Fax: [phone]   
[phone]

## 2023-05-09 ENCOUNTER — OUTPATIENT (OUTPATIENT)
Dept: OUTPATIENT SERVICES | Facility: HOSPITAL | Age: 43
LOS: 1 days | End: 2023-05-09

## 2023-05-09 ENCOUNTER — APPOINTMENT (OUTPATIENT)
Dept: SURGERY | Facility: HOSPITAL | Age: 43
End: 2023-05-09

## 2023-05-09 VITALS
TEMPERATURE: 97.9 F | HEIGHT: 63 IN | HEART RATE: 68 BPM | DIASTOLIC BLOOD PRESSURE: 77 MMHG | BODY MASS INDEX: 31.89 KG/M2 | SYSTOLIC BLOOD PRESSURE: 124 MMHG | WEIGHT: 180 LBS

## 2023-05-09 DIAGNOSIS — Z98.890 OTHER SPECIFIED POSTPROCEDURAL STATES: Chronic | ICD-10-CM

## 2023-05-09 DIAGNOSIS — Z90.49 ACQUIRED ABSENCE OF OTHER SPECIFIED PARTS OF DIGESTIVE TRACT: Chronic | ICD-10-CM

## 2023-05-09 NOTE — PLAN
[FreeTextEntry1] : The CT scan results were discussed with the patient that does not reveal any abnormalities. Patient to continue over the counter pain medications. \par Patient may return to surgery clinic if there are any changes.

## 2023-05-09 NOTE — ASSESSMENT
[FreeTextEntry1] : A 43 year old female s/p laparoscopic umbilical hernia repair 12/2022. Patient complains of periumbilical sharp pain that comes and goes. It is not associated with symptom and does not refer. Patient did a CT scan that does not show any abnormalities regarding the pain. The pain is most likely secondary to tac placements.

## 2023-05-09 NOTE — PLAN
[FreeTextEntry1] : 1. CT scan results (normal) was discussed with the patient. Stable pulmonary nodule.\par 2. Pain management was discussed with the patient (OTC pain medicine).\par 3. No further follow up is required unless a new problem occurs.

## 2023-05-10 DIAGNOSIS — R52 PAIN, UNSPECIFIED: ICD-10-CM

## 2023-05-12 NOTE — ASSESSMENT
[FreeTextEntry1] : 42F no PMH, with PSH lap appendectomy w/ umbilical hernia repair (July 2019), umbilical hernia repair w/mesh (Dec 2022) for recurrent umbilical hernia. Patient presents for follow-up with recent recurrence of pain on LLQ. \par \par PLAN\par - CT A/P with IV contrast\par - Evaluate for structural causes of pain, likely pain 2/2 metal tac\par - If no structural issues, consider gabapentin for pain control \par - Follow-up after CT scan\par \par Discussed with Dr Paris \par

## 2023-05-12 NOTE — PHYSICAL EXAM
[JVD] : no jugular venous distention  [Normal Thyroid] : the thyroid was normal [Carotid Bruits] : no carotid bruits [Normal Breath Sounds] : Normal breath sounds [Normal Heart Sounds] : normal heart sounds [Abdominal Masses] : No abdominal masses [Abdomen Tenderness] : ~T ~M No abdominal tenderness [No HSM] : no hepatosplenomegaly [Tender] : was nontender [Enlarged] : not enlarged [Stool Sample Taken] : No stool obtained  on rectal exam [de-identified] : Deferred [de-identified] : Deferred  [de-identified] : NAD, AOx3 [de-identified] : Mid LLQ tenderness to deep palpation. Soft, non-tender in other quadrants including port sites. No rebound tenderness, or peritonitis

## 2023-05-12 NOTE — HISTORY OF PRESENT ILLNESS
[de-identified] : \par 42F no PMH, with PSH lap appendectomy w/ umbilical hernia repair (July 2019), umbilical hernia repair w/mesh (Dec 2022) for recurrent umbilical hernia. Patient presents for similar issues to her last visit with acute, random onset sharp pain on her left abdominal quadrants near her port site that started approximately 8 days ago. She rates pain 10/10 when it comes and resolves spontaneously. Uses over the counter medications to control pain, used oxy x1.  Pain is not associated with any specific activity.\par \par Pt denies any nausea, vomiting, constipation or obstipation. Pt denies any heavy lifting or strenuous activity. Pt reports no pain when voiding, regular bowel movements and regular frequency. \par

## 2023-05-12 NOTE — ADDENDUM
[FreeTextEntry1] : Pt seen and examined with resident. Agree with above. \par \par Kristina Paris MD \par [facility]

## 2023-05-12 NOTE — PHYSICAL EXAM
[JVD] : no jugular venous distention  [Normal Thyroid] : the thyroid was normal [Carotid Bruits] : no carotid bruits [Normal Breath Sounds] : Normal breath sounds [Normal Heart Sounds] : normal heart sounds [Abdominal Masses] : No abdominal masses [Abdomen Tenderness] : ~T ~M No abdominal tenderness [No HSM] : no hepatosplenomegaly [Tender] : was nontender [Enlarged] : not enlarged [Stool Sample Taken] : No stool obtained  on rectal exam [de-identified] : Deferred [de-identified] : Deferred  [de-identified] : NAD, AOx3 [de-identified] : Mid LLQ tenderness to deep palpation. Soft, non-tender in other quadrants including port sites. No rebound tenderness, or peritonitis

## 2023-05-12 NOTE — HISTORY OF PRESENT ILLNESS
[de-identified] : \par 42F no PMH, with PSH lap appendectomy w/ umbilical hernia repair (July 2019), umbilical hernia repair w/mesh (Dec 2022) for recurrent umbilical hernia. Patient presents for similar issues to her last visit with acute, random onset sharp pain on her left abdominal quadrants near her port site that started approximately 8 days ago. She rates pain 10/10 when it comes and resolves spontaneously. Uses over the counter medications to control pain, used oxy x1.  Pain is not associated with any specific activity.\par \par Pt denies any nausea, vomiting, constipation or obstipation. Pt denies any heavy lifting or strenuous activity. Pt reports no pain when voiding, regular bowel movements and regular frequency. \par

## 2023-05-23 ENCOUNTER — APPOINTMENT (OUTPATIENT)
Dept: OBGYN | Facility: HOSPITAL | Age: 43
End: 2023-05-23

## 2023-07-25 ENCOUNTER — APPOINTMENT (OUTPATIENT)
Dept: SURGERY | Facility: HOSPITAL | Age: 43
End: 2023-07-25

## 2023-07-25 ENCOUNTER — OUTPATIENT (OUTPATIENT)
Dept: OUTPATIENT SERVICES | Facility: HOSPITAL | Age: 43
LOS: 1 days | End: 2023-07-25

## 2023-07-25 VITALS
DIASTOLIC BLOOD PRESSURE: 70 MMHG | WEIGHT: 179 LBS | BODY MASS INDEX: 31.71 KG/M2 | SYSTOLIC BLOOD PRESSURE: 109 MMHG | HEART RATE: 83 BPM | HEIGHT: 63 IN | TEMPERATURE: 98.1 F

## 2023-07-25 DIAGNOSIS — Z90.49 ACQUIRED ABSENCE OF OTHER SPECIFIED PARTS OF DIGESTIVE TRACT: Chronic | ICD-10-CM

## 2023-07-25 DIAGNOSIS — Z98.890 OTHER SPECIFIED POSTPROCEDURAL STATES: Chronic | ICD-10-CM

## 2023-07-25 NOTE — HISTORY OF PRESENT ILLNESS
[de-identified] : Mrs. Phipps is a 43 year old female with history of laparoscopic umbilical hernia repair with mesh (12/2022) who presents today in clinic with right lower quadrant abdominal pain. \par  [de-identified] : Mrs. Phipps is a 43 year old female with history of laparoscopic umbilical hernia repair with mesh (12/2022) who presents with right lower quadrant abdominal pain. The pain started approximately 1 month ago after she had a positive pregnancy test at home on June 28. The pain is 4-5/10, constant, and nonradiating. The patient says it is manageable, denying any pain medication use to improve the pain. She continues to pass flatus, pass stool, and tolerates food and water. However, the patient does have nausea and vomiting that started when she had her positive pregnancy test. Her last menstrual period was May 28 and she is not on birth control. She follows an OBGYN at [facility].\par \par She has previously presented to clinic in February with left lower quadrant pain 8/10 in pain managed by Tylenol. She had a CT scan for work up, which showed no recurrence of hernia, normal bowel, and normal post surgical changes.\par \par Patient would like to know whether she can proceed with a pregnancy after having had a mesh hernia repair, and also presents for a note to say that she may return to her work as a / .

## 2023-07-25 NOTE — PLAN
[FreeTextEntry1] : #Right lower quadrant abdominal pain\par - Follow up with OBGYN: pain likely secondary to pregnancy\par - Over the counter pain medications as needed for pain\par \par #Return to work\par - Patient is clinically stable and is medically cleared to return to work at normal capacity\par \par #Positive pregnancy test\par - Follow up with OBGYN\par - From surgical perspective, hernia repair with mesh is not a contraindication to carrying out a pregnancy. Patient may continue her pregnancy, if desired, from our surgical perspective.

## 2023-07-25 NOTE — PHYSICAL EXAM
[Normal Breath Sounds] : Normal breath sounds [Alert] : alert [Oriented to Person] : oriented to person [Oriented to Place] : oriented to place [Oriented to Time] : oriented to time [Calm] : calm [de-identified] : Well dressed [de-identified] : Normocephalic, atraumatic [de-identified] : N [de-identified] : Soft, nondistended, tender to palpation in RLQ and supraumbilical area, incisions have completely healed, no defect can be palpated over supraumbilical area or over any of the incision sites, even after patient coughs

## 2023-07-25 NOTE — ASSESSMENT
[FreeTextEntry1] : Mrs. Phipps is a 43 year old female with history of laparoscopic umbilical hernia repair with mesh (12/2022) who presents with right lower quadrant abdominal pain. She is recovering appropriately 7 months after her surgery, now able to tolerate her pain without any pain medications and comfortable going back to her job. She continues to have normal bowel function and only complains of nausea and vomiting, which is most likely attributable to her positive pregnancy test. Her new onset right lower quadrant pain is also likely attributable to her positive pregnancy test. Patient's abdominal physical exam is benign and shows well healed incisions and no palpated abdominal defects even with coughing, recovering as expected for someone 7 months out from this type of operation. The patient is clinically stable and is medically cleared to return to work.

## 2023-07-26 DIAGNOSIS — K42.9 UMBILICAL HERNIA WITHOUT OBSTRUCTION OR GANGRENE: ICD-10-CM

## 2023-08-29 ENCOUNTER — APPOINTMENT (OUTPATIENT)
Age: 43
End: 2023-08-29

## 2023-09-12 ENCOUNTER — APPOINTMENT (OUTPATIENT)
Dept: OBGYN | Facility: HOSPITAL | Age: 43
End: 2023-09-12

## 2023-09-12 ENCOUNTER — OUTPATIENT (OUTPATIENT)
Dept: OUTPATIENT SERVICES | Facility: HOSPITAL | Age: 43
LOS: 1 days | End: 2023-09-12

## 2023-09-12 ENCOUNTER — RESULT CHARGE (OUTPATIENT)
Age: 43
End: 2023-09-12

## 2023-09-12 DIAGNOSIS — Z90.49 ACQUIRED ABSENCE OF OTHER SPECIFIED PARTS OF DIGESTIVE TRACT: Chronic | ICD-10-CM

## 2023-09-12 DIAGNOSIS — Z32.01 ENCOUNTER FOR PREGNANCY TEST, RESULT POSITIVE: ICD-10-CM

## 2023-09-12 DIAGNOSIS — Z98.890 OTHER SPECIFIED POSTPROCEDURAL STATES: Chronic | ICD-10-CM

## 2023-09-15 ENCOUNTER — OUTPATIENT (OUTPATIENT)
Dept: OUTPATIENT SERVICES | Facility: HOSPITAL | Age: 43
LOS: 1 days | End: 2023-09-15
Payer: MEDICAID

## 2023-09-15 ENCOUNTER — APPOINTMENT (OUTPATIENT)
Dept: OBGYN | Facility: CLINIC | Age: 43
End: 2023-09-15
Payer: MEDICAID

## 2023-09-15 VITALS
OXYGEN SATURATION: 100 % | WEIGHT: 180 LBS | BODY MASS INDEX: 31.89 KG/M2 | DIASTOLIC BLOOD PRESSURE: 70 MMHG | HEIGHT: 63 IN | TEMPERATURE: 98 F | SYSTOLIC BLOOD PRESSURE: 105 MMHG | HEART RATE: 77 BPM | RESPIRATION RATE: 16 BRPM

## 2023-09-15 DIAGNOSIS — Z34.00 ENCOUNTER FOR SUPERVISION OF NORMAL FIRST PREGNANCY, UNSPECIFIED TRIMESTER: ICD-10-CM

## 2023-09-15 DIAGNOSIS — Z98.890 OTHER SPECIFIED POSTPROCEDURAL STATES: Chronic | ICD-10-CM

## 2023-09-15 DIAGNOSIS — Z90.49 ACQUIRED ABSENCE OF OTHER SPECIFIED PARTS OF DIGESTIVE TRACT: Chronic | ICD-10-CM

## 2023-09-15 PROCEDURE — 87086 URINE CULTURE/COLONY COUNT: CPT

## 2023-09-15 PROCEDURE — 84550 ASSAY OF BLOOD/URIC ACID: CPT

## 2023-09-15 PROCEDURE — 81003 URINALYSIS AUTO W/O SCOPE: CPT

## 2023-09-15 PROCEDURE — G0463: CPT

## 2023-09-15 PROCEDURE — 81420 FETAL CHRMOML ANEUPLOIDY: CPT

## 2023-09-15 PROCEDURE — 87389 HIV-1 AG W/HIV-1&-2 AB AG IA: CPT

## 2023-09-15 PROCEDURE — 99203 OFFICE O/P NEW LOW 30 MIN: CPT

## 2023-09-15 PROCEDURE — 84439 ASSAY OF FREE THYROXINE: CPT

## 2023-09-15 PROCEDURE — 81220 CFTR GENE COM VARIANTS: CPT

## 2023-09-15 PROCEDURE — 36415 COLL VENOUS BLD VENIPUNCTURE: CPT | Mod: NC

## 2023-09-15 PROCEDURE — 86901 BLOOD TYPING SEROLOGIC RH(D): CPT

## 2023-09-15 PROCEDURE — 86787 VARICELLA-ZOSTER ANTIBODY: CPT

## 2023-09-15 PROCEDURE — 82105 ALPHA-FETOPROTEIN SERUM: CPT

## 2023-09-15 PROCEDURE — 83020 HEMOGLOBIN ELECTROPHORESIS: CPT

## 2023-09-15 PROCEDURE — 80053 COMPREHEN METABOLIC PANEL: CPT

## 2023-09-15 PROCEDURE — 36415 COLL VENOUS BLD VENIPUNCTURE: CPT

## 2023-09-15 PROCEDURE — 86780 TREPONEMA PALLIDUM: CPT

## 2023-09-15 PROCEDURE — 84443 ASSAY THYROID STIM HORMONE: CPT

## 2023-09-15 PROCEDURE — 86480 TB TEST CELL IMMUN MEASURE: CPT

## 2023-09-15 PROCEDURE — 86900 BLOOD TYPING SEROLOGIC ABO: CPT

## 2023-09-15 PROCEDURE — 81025 URINE PREGNANCY TEST: CPT

## 2023-09-15 PROCEDURE — 83036 HEMOGLOBIN GLYCOSYLATED A1C: CPT

## 2023-09-15 PROCEDURE — 86765 RUBEOLA ANTIBODY: CPT

## 2023-09-15 PROCEDURE — 86762 RUBELLA ANTIBODY: CPT

## 2023-09-15 PROCEDURE — 86850 RBC ANTIBODY SCREEN: CPT

## 2023-09-15 PROCEDURE — 85025 COMPLETE CBC W/AUTO DIFF WBC: CPT

## 2023-09-15 PROCEDURE — 83655 ASSAY OF LEAD: CPT

## 2023-09-15 PROCEDURE — 84156 ASSAY OF PROTEIN URINE: CPT

## 2023-09-15 PROCEDURE — 86803 HEPATITIS C AB TEST: CPT

## 2023-09-15 PROCEDURE — 87340 HEPATITIS B SURFACE AG IA: CPT

## 2023-09-15 RX ORDER — CLOBETASOL PROPIONATE 0.5 MG/ML
0.05 SOLUTION TOPICAL
Qty: 1 | Refills: 1 | Status: DISCONTINUED | COMMUNITY
Start: 2019-06-12 | End: 2023-09-15

## 2023-09-15 RX ORDER — KETOCONAZOLE 20.5 MG/ML
2 SHAMPOO, SUSPENSION TOPICAL
Qty: 1 | Refills: 3 | Status: DISCONTINUED | COMMUNITY
Start: 2019-06-12 | End: 2023-09-15

## 2023-09-15 RX ORDER — NITROFURANTOIN MACROCRYSTALS 100 MG/1
100 CAPSULE ORAL
Qty: 10 | Refills: 0 | Status: DISCONTINUED | COMMUNITY
Start: 2022-04-18 | End: 2023-09-15

## 2023-09-18 ENCOUNTER — NON-APPOINTMENT (OUTPATIENT)
Age: 43
End: 2023-09-18

## 2023-09-18 DIAGNOSIS — Z87.59 PERSONAL HISTORY OF OTHER COMPLICATIONS OF PREGNANCY, CHILDBIRTH AND THE PUERPERIUM: ICD-10-CM

## 2023-09-18 DIAGNOSIS — Z3A.16 16 WEEKS GESTATION OF PREGNANCY: ICD-10-CM

## 2023-09-18 DIAGNOSIS — O09.899 SUPERVISION OF OTHER HIGH RISK PREGNANCIES, UNSPECIFIED TRIMESTER: ICD-10-CM

## 2023-09-18 DIAGNOSIS — O09.529 SUPERVISION OF ELDERLY MULTIGRAVIDA, UNSPECIFIED TRIMESTER: ICD-10-CM

## 2023-09-18 LAB
ABO + RH PNL BLD: NORMAL
ALBUMIN SERPL ELPH-MCNC: 3.9 G/DL
ALP BLD-CCNC: 53 U/L
ALT SERPL-CCNC: 18 U/L
ANION GAP SERPL CALC-SCNC: 11 MMOL/L
AST SERPL-CCNC: 14 U/L
BASOPHILS # BLD AUTO: 0.02 K/UL
BASOPHILS NFR BLD AUTO: 0.2 %
BILIRUB SERPL-MCNC: 0.2 MG/DL
BLD GP AB SCN SERPL QL: NORMAL
BUN SERPL-MCNC: 7 MG/DL
C TRACH RRNA SPEC QL NAA+PROBE: NOT DETECTED
CALCIUM SERPL-MCNC: 9.2 MG/DL
CHLORIDE SERPL-SCNC: 103 MMOL/L
CO2 SERPL-SCNC: 21 MMOL/L
CREAT SERPL-MCNC: 0.55 MG/DL
CREAT SPEC-SCNC: 143 MG/DL
CREAT/PROT UR: 0.1 RATIO
EGFR: 118 ML/MIN/1.73M2
EOSINOPHIL # BLD AUTO: 0.1 K/UL
EOSINOPHIL NFR BLD AUTO: 1 %
ESTIMATED AVERAGE GLUCOSE: 105 MG/DL
GLUCOSE SERPL-MCNC: 81 MG/DL
HBA1C MFR BLD HPLC: 5.3 %
HBV SURFACE AG SER QL: NONREACTIVE
HCT VFR BLD CALC: 35.8 %
HCV AB SER QL: NONREACTIVE
HCV S/CO RATIO: 0.05 S/CO
HGB BLD-MCNC: 11.7 G/DL
HIV1+2 AB SPEC QL IA.RAPID: NONREACTIVE
IMM GRANULOCYTES NFR BLD AUTO: 0.3 %
LYMPHOCYTES # BLD AUTO: 2.79 K/UL
LYMPHOCYTES NFR BLD AUTO: 28.1 %
M TB IFN-G BLD-IMP: NEGATIVE
MAN DIFF?: NORMAL
MCHC RBC-ENTMCNC: 31 PG
MCHC RBC-ENTMCNC: 32.7 GM/DL
MCV RBC AUTO: 95 FL
MEV IGG FLD QL IA: <5 AU/ML
MEV IGG+IGM SER-IMP: NEGATIVE
MONOCYTES # BLD AUTO: 0.46 K/UL
MONOCYTES NFR BLD AUTO: 4.6 %
N GONORRHOEA RRNA SPEC QL NAA+PROBE: NOT DETECTED
NEUTROPHILS # BLD AUTO: 6.54 K/UL
NEUTROPHILS NFR BLD AUTO: 65.8 %
PLATELET # BLD AUTO: 323 K/UL
POTASSIUM SERPL-SCNC: 4.6 MMOL/L
PROT SERPL-MCNC: 6.7 G/DL
PROT UR-MCNC: 15 MG/DL
QUANTIFERON TB PLUS MITOGEN MINUS NIL: 1.21 IU/ML
QUANTIFERON TB PLUS NIL: 0.02 IU/ML
QUANTIFERON TB PLUS TB1 MINUS NIL: 0 IU/ML
QUANTIFERON TB PLUS TB2 MINUS NIL: 0 IU/ML
RBC # BLD: 3.77 M/UL
RBC # FLD: 13.2 %
RUBV IGG FLD-ACNC: 5.2 INDEX
RUBV IGG SER-IMP: POSITIVE
SODIUM SERPL-SCNC: 135 MMOL/L
SOURCE AMPLIFICATION: NORMAL
T PALLIDUM AB SER QL IA: NEGATIVE
T4 FREE SERPL-MCNC: 0.7 NG/DL
TSH SERPL-ACNC: 0.79 UIU/ML
URATE SERPL-MCNC: 3.4 MG/DL
VZV AB TITR SER: NORMAL
VZV IGG SER IF-ACNC: 142.7 INDEX
WBC # FLD AUTO: 9.94 K/UL

## 2023-09-19 ENCOUNTER — LABORATORY RESULT (OUTPATIENT)
Age: 43
End: 2023-09-19

## 2023-09-19 ENCOUNTER — APPOINTMENT (OUTPATIENT)
Dept: ANTEPARTUM | Facility: CLINIC | Age: 43
End: 2023-09-19
Payer: MEDICAID

## 2023-09-19 ENCOUNTER — ASOB RESULT (OUTPATIENT)
Age: 43
End: 2023-09-19

## 2023-09-19 ENCOUNTER — OUTPATIENT (OUTPATIENT)
Dept: OUTPATIENT SERVICES | Facility: HOSPITAL | Age: 43
LOS: 1 days | End: 2023-09-19
Payer: MEDICAID

## 2023-09-19 DIAGNOSIS — R11.0 NAUSEA: ICD-10-CM

## 2023-09-19 DIAGNOSIS — Z34.90 ENCOUNTER FOR SUPERVISION OF NORMAL PREGNANCY, UNSPECIFIED, UNSPECIFIED TRIMESTER: ICD-10-CM

## 2023-09-19 DIAGNOSIS — Z98.890 OTHER SPECIFIED POSTPROCEDURAL STATES: Chronic | ICD-10-CM

## 2023-09-19 LAB — LEAD BLD-MCNC: <1 UG/DL

## 2023-09-19 PROCEDURE — 81329 SMN1 GENE DOS/DELETION ALYS: CPT

## 2023-09-19 PROCEDURE — 86147 CARDIOLIPIN ANTIBODY EA IG: CPT

## 2023-09-19 PROCEDURE — 85730 THROMBOPLASTIN TIME PARTIAL: CPT

## 2023-09-19 PROCEDURE — 81243 FMR1 GEN ALY DETC ABNL ALLEL: CPT

## 2023-09-19 PROCEDURE — 85613 RUSSELL VIPER VENOM DILUTED: CPT

## 2023-09-19 PROCEDURE — 76805 OB US >/= 14 WKS SNGL FETUS: CPT | Mod: 59

## 2023-09-19 PROCEDURE — 86146 BETA-2 GLYCOPROTEIN ANTIBODY: CPT

## 2023-09-19 PROCEDURE — 76817 TRANSVAGINAL US OBSTETRIC: CPT

## 2023-09-19 RX ORDER — DOXYLAMINE SUCCINATE 25 MG
25 TABLET ORAL
Qty: 30 | Refills: 0 | Status: ACTIVE | COMMUNITY
Start: 2023-09-19 | End: 1900-01-01

## 2023-09-19 RX ORDER — PRENATAL WITH FERROUS FUM AND FOLIC ACID 3080; 920; 120; 400; 22; 1.84; 3; 20; 10; 1; 12; 200; 27; 25; 2 [IU]/1; [IU]/1; MG/1; [IU]/1; MG/1; MG/1; MG/1; MG/1; MG/1; MG/1; UG/1; MG/1; MG/1; MG/1; MG/1
27-1 TABLET ORAL DAILY
Qty: 30 | Refills: 11 | Status: ACTIVE | COMMUNITY
Start: 2023-09-19 | End: 1900-01-01

## 2023-09-19 RX ORDER — PYRIDOXINE HCL (VITAMIN B6) 25 MG
25 TABLET ORAL
Qty: 90 | Refills: 1 | Status: ACTIVE | COMMUNITY
Start: 2023-09-19 | End: 1900-01-01

## 2023-09-20 LAB
AFP MOM: 1.75
AFP VALUE: 53.3 NG/ML
ALPHA FETOPROTEIN SERUM COMMENT: NORMAL
ALPHA FETOPROTEIN SERUM INTERPRETATION: NORMAL
ALPHA FETOPROTEIN SERUM RESULTS: NORMAL
ALPHA FETOPROTEIN SERUM TEST RESULTS: NORMAL
BACTERIA UR CULT: ABNORMAL
GESTATIONAL AGE BASED ON: NORMAL
GESTATIONAL AGE ON COLLECTION DATE: 16.1 WEEKS
HGB A MFR BLD: 95.6 %
HGB A2 MFR BLD: 3 %
HGB F MFR BLD: 1.4 %
HGB FRACT BLD-IMP: NORMAL
INSULIN DEP DIABETES: NO
MATERNAL AGE AT EDD AFP: 41.8 YR
MULTIPLE GESTATION: NO
OSBR RISK 1 IN: 1442
RACE: NORMAL
WEIGHT AFP: 180 LBS

## 2023-09-20 RX ORDER — NITROFURANTOIN (MONOHYDRATE/MACROCRYSTALS) 25; 75 MG/1; MG/1
100 CAPSULE ORAL
Qty: 14 | Refills: 0 | Status: ACTIVE | COMMUNITY
Start: 2023-09-20 | End: 1900-01-01

## 2023-09-21 LAB
B2 GLYCOPROT1 IGG SER-ACNC: <5 SGU
B2 GLYCOPROT1 IGM SER-ACNC: <5 SMU
CARDIOLIPIN IGM SER-MCNC: 6.5 MPL
CARDIOLIPIN IGM SER-MCNC: <5 GPL
CHROMOSOME13 INTERPRETATION: NORMAL
CHROMOSOME13 TEST RESULT: NORMAL
CHROMOSOME18 INTERPRETATION: NORMAL
CHROMOSOME18 TEST RESULT: NORMAL
CHROMOSOME21 INTERPRETATION: NORMAL
CHROMOSOME21 TEST RESULT: NORMAL
FETAL FRACTION: NORMAL
PERFORMANCE AND LIMITATIONS: NORMAL
SEX CHROMOSOME INTERPRETATION: NORMAL
SEX CHROMOSOME TEST RESULT: NORMAL
VERIFI PRENATAL TEST: NOT DETECTED

## 2023-09-22 LAB
AR GENE MUT ANL BLD/T: NORMAL
FMR1 GENE MUT ANL BLD/T: NORMAL

## 2023-09-22 RX ORDER — METOCLOPRAMIDE 10 MG/1
10 TABLET ORAL 3 TIMES DAILY
Qty: 90 | Refills: 0 | Status: ACTIVE | COMMUNITY
Start: 2023-09-22 | End: 1900-01-01

## 2023-09-25 LAB — CFTR MUT TESTED BLD/T: NEGATIVE

## 2023-10-03 ENCOUNTER — APPOINTMENT (OUTPATIENT)
Dept: ANTEPARTUM | Facility: CLINIC | Age: 43
End: 2023-10-03

## 2023-10-12 ENCOUNTER — OUTPATIENT (OUTPATIENT)
Dept: OUTPATIENT SERVICES | Facility: HOSPITAL | Age: 43
LOS: 1 days | End: 2023-10-12
Payer: MEDICAID

## 2023-10-12 ENCOUNTER — APPOINTMENT (OUTPATIENT)
Dept: ANTEPARTUM | Facility: CLINIC | Age: 43
End: 2023-10-12
Payer: MEDICAID

## 2023-10-12 ENCOUNTER — LABORATORY RESULT (OUTPATIENT)
Age: 43
End: 2023-10-12

## 2023-10-12 ENCOUNTER — ASOB RESULT (OUTPATIENT)
Age: 43
End: 2023-10-12

## 2023-10-12 ENCOUNTER — APPOINTMENT (OUTPATIENT)
Dept: OBGYN | Facility: CLINIC | Age: 43
End: 2023-10-12
Payer: MEDICAID

## 2023-10-12 VITALS
SYSTOLIC BLOOD PRESSURE: 107 MMHG | DIASTOLIC BLOOD PRESSURE: 69 MMHG | HEART RATE: 70 BPM | RESPIRATION RATE: 16 BRPM | HEIGHT: 63 IN | TEMPERATURE: 98.6 F | BODY MASS INDEX: 32.6 KG/M2 | WEIGHT: 184 LBS | OXYGEN SATURATION: 99 %

## 2023-10-12 VITALS
HEART RATE: 76 BPM | HEIGHT: 63 IN | SYSTOLIC BLOOD PRESSURE: 99 MMHG | DIASTOLIC BLOOD PRESSURE: 64 MMHG | WEIGHT: 184 LBS | BODY MASS INDEX: 32.6 KG/M2

## 2023-10-12 DIAGNOSIS — R10.31 RIGHT LOWER QUADRANT PAIN: ICD-10-CM

## 2023-10-12 DIAGNOSIS — Z01.419 ENCOUNTER FOR GYNECOLOGICAL EXAMINATION (GENERAL) (ROUTINE) W/OUT ABNORMAL FINDINGS: ICD-10-CM

## 2023-10-12 DIAGNOSIS — Z87.42 PERSONAL HISTORY OF OTHER DISEASES OF THE FEMALE GENITAL TRACT: ICD-10-CM

## 2023-10-12 DIAGNOSIS — Z11.3 ENCOUNTER FOR SCREENING FOR INFECTIONS WITH A PREDOMINANTLY SEXUAL MODE OF TRANSMISSION: ICD-10-CM

## 2023-10-12 DIAGNOSIS — R10.32 LEFT LOWER QUADRANT PAIN: ICD-10-CM

## 2023-10-12 DIAGNOSIS — Z09 ENCOUNTER FOR FOLLOW-UP EXAMINATION AFTER COMPLETED TREATMENT FOR CONDITIONS OTHER THAN MALIGNANT NEOPLASM: ICD-10-CM

## 2023-10-12 DIAGNOSIS — Z32.00 ENCOUNTER FOR PREGNANCY TEST, RESULT UNKNOWN: ICD-10-CM

## 2023-10-12 DIAGNOSIS — Z90.49 ACQUIRED ABSENCE OF OTHER SPECIFIED PARTS OF DIGESTIVE TRACT: Chronic | ICD-10-CM

## 2023-10-12 DIAGNOSIS — Z30.09 ENCOUNTER FOR OTHER GENERAL COUNSELING AND ADVICE ON CONTRACEPTION: ICD-10-CM

## 2023-10-12 DIAGNOSIS — Z98.890 OTHER SPECIFIED POSTPROCEDURAL STATES: Chronic | ICD-10-CM

## 2023-10-12 DIAGNOSIS — Z34.00 ENCOUNTER FOR SUPERVISION OF NORMAL FIRST PREGNANCY, UNSPECIFIED TRIMESTER: ICD-10-CM

## 2023-10-12 PROCEDURE — 86787 VARICELLA-ZOSTER ANTIBODY: CPT

## 2023-10-12 PROCEDURE — 36415 COLL VENOUS BLD VENIPUNCTURE: CPT

## 2023-10-12 PROCEDURE — 36415 COLL VENOUS BLD VENIPUNCTURE: CPT | Mod: NC

## 2023-10-12 PROCEDURE — 76817 TRANSVAGINAL US OBSTETRIC: CPT

## 2023-10-12 PROCEDURE — 87086 URINE CULTURE/COLONY COUNT: CPT

## 2023-10-12 PROCEDURE — 84445 ASSAY OF TSI GLOBULIN: CPT

## 2023-10-12 PROCEDURE — 76811 OB US DETAILED SNGL FETUS: CPT | Mod: 59

## 2023-10-12 PROCEDURE — 84443 ASSAY THYROID STIM HORMONE: CPT

## 2023-10-12 PROCEDURE — G0463: CPT

## 2023-10-12 PROCEDURE — 84439 ASSAY OF FREE THYROXINE: CPT

## 2023-10-12 PROCEDURE — 99213 OFFICE O/P EST LOW 20 MIN: CPT

## 2023-10-12 PROCEDURE — 84481 FREE ASSAY (FT-3): CPT

## 2023-10-12 PROCEDURE — 81003 URINALYSIS AUTO W/O SCOPE: CPT

## 2023-10-12 PROCEDURE — 86800 THYROGLOBULIN ANTIBODY: CPT

## 2023-10-12 PROCEDURE — 86376 MICROSOMAL ANTIBODY EACH: CPT

## 2023-10-13 DIAGNOSIS — Z3A.20 20 WEEKS GESTATION OF PREGNANCY: ICD-10-CM

## 2023-10-13 DIAGNOSIS — O09.529 SUPERVISION OF ELDERLY MULTIGRAVIDA, UNSPECIFIED TRIMESTER: ICD-10-CM

## 2023-10-13 DIAGNOSIS — O09.899 SUPERVISION OF OTHER HIGH RISK PREGNANCIES, UNSPECIFIED TRIMESTER: ICD-10-CM

## 2023-10-13 DIAGNOSIS — Z87.59 PERSONAL HISTORY OF OTHER COMPLICATIONS OF PREGNANCY, CHILDBIRTH AND THE PUERPERIUM: ICD-10-CM

## 2023-10-13 LAB
T3FREE SERPL-MCNC: 3.02 PG/ML
T4 FREE SERPL-MCNC: 0.7 NG/DL
TSH SERPL-ACNC: 1.05 UIU/ML
VZV AB TITR SER: NEGATIVE
VZV IGG SER IF-ACNC: 132.7 INDEX

## 2023-10-16 LAB
BACTERIA UR CULT: NORMAL
TSI ACT/NOR SER: <0.1 IU/L

## 2023-10-26 ENCOUNTER — APPOINTMENT (OUTPATIENT)
Dept: ANTEPARTUM | Facility: CLINIC | Age: 43
End: 2023-10-26
Payer: MEDICAID

## 2023-10-26 ENCOUNTER — ASOB RESULT (OUTPATIENT)
Age: 43
End: 2023-10-26

## 2023-10-26 PROCEDURE — 76815 OB US LIMITED FETUS(S): CPT

## 2023-10-26 PROCEDURE — 76817 TRANSVAGINAL US OBSTETRIC: CPT

## 2023-10-30 ENCOUNTER — APPOINTMENT (OUTPATIENT)
Dept: ANTEPARTUM | Facility: CLINIC | Age: 43
End: 2023-10-30

## 2023-10-31 NOTE — H&P PST ADULT - NSICDXFAMILYHX_GEN_ALL_CORE_FT
FAMILY HISTORY:  No pertinent family history in first degree relatives     2019 novel coronavirus disease (COVID-19) 2019 novel coronavirus disease (COVID-19) 2019 novel coronavirus disease (COVID-19)

## 2023-11-08 ENCOUNTER — NON-APPOINTMENT (OUTPATIENT)
Age: 43
End: 2023-11-08

## 2023-11-09 ENCOUNTER — APPOINTMENT (OUTPATIENT)
Dept: OBGYN | Facility: CLINIC | Age: 43
End: 2023-11-09
Payer: MEDICAID

## 2023-11-09 ENCOUNTER — ASOB RESULT (OUTPATIENT)
Age: 43
End: 2023-11-09

## 2023-11-09 ENCOUNTER — APPOINTMENT (OUTPATIENT)
Dept: ANTEPARTUM | Facility: CLINIC | Age: 43
End: 2023-11-09
Payer: MEDICAID

## 2023-11-09 ENCOUNTER — OUTPATIENT (OUTPATIENT)
Dept: OUTPATIENT SERVICES | Facility: HOSPITAL | Age: 43
LOS: 1 days | End: 2023-11-09
Payer: MEDICAID

## 2023-11-09 VITALS
DIASTOLIC BLOOD PRESSURE: 56 MMHG | TEMPERATURE: 97.5 F | SYSTOLIC BLOOD PRESSURE: 93 MMHG | WEIGHT: 189 LBS | HEART RATE: 79 BPM | OXYGEN SATURATION: 99 % | HEIGHT: 63 IN | BODY MASS INDEX: 33.49 KG/M2

## 2023-11-09 DIAGNOSIS — Z34.00 ENCOUNTER FOR SUPERVISION OF NORMAL FIRST PREGNANCY, UNSPECIFIED TRIMESTER: ICD-10-CM

## 2023-11-09 DIAGNOSIS — Z98.890 OTHER SPECIFIED POSTPROCEDURAL STATES: Chronic | ICD-10-CM

## 2023-11-09 PROCEDURE — 99213 OFFICE O/P EST LOW 20 MIN: CPT

## 2023-11-09 PROCEDURE — 76816 OB US FOLLOW-UP PER FETUS: CPT

## 2023-11-09 PROCEDURE — G0463: CPT

## 2023-11-09 PROCEDURE — 81003 URINALYSIS AUTO W/O SCOPE: CPT

## 2023-11-10 DIAGNOSIS — O09.529 SUPERVISION OF ELDERLY MULTIGRAVIDA, UNSPECIFIED TRIMESTER: ICD-10-CM

## 2023-11-10 DIAGNOSIS — Z34.90 ENCOUNTER FOR SUPERVISION OF NORMAL PREGNANCY, UNSPECIFIED, UNSPECIFIED TRIMESTER: ICD-10-CM

## 2023-11-13 ENCOUNTER — APPOINTMENT (OUTPATIENT)
Age: 43
End: 2023-11-13
Payer: COMMERCIAL

## 2023-11-13 PROCEDURE — D0270 BITEWING - SINGLE RADIOGRAPHIC IMAGE: CPT

## 2023-11-13 PROCEDURE — D9110: CPT

## 2023-11-13 PROCEDURE — D0220: CPT

## 2023-11-13 PROCEDURE — D0140: CPT

## 2023-11-22 ENCOUNTER — APPOINTMENT (OUTPATIENT)
Age: 43
End: 2023-11-22

## 2023-11-27 ENCOUNTER — APPOINTMENT (OUTPATIENT)
Age: 43
End: 2023-11-27

## 2023-12-07 ENCOUNTER — ASOB RESULT (OUTPATIENT)
Age: 43
End: 2023-12-07

## 2023-12-07 ENCOUNTER — OUTPATIENT (OUTPATIENT)
Dept: OUTPATIENT SERVICES | Facility: HOSPITAL | Age: 43
LOS: 1 days | End: 2023-12-07
Payer: MEDICAID

## 2023-12-07 ENCOUNTER — APPOINTMENT (OUTPATIENT)
Dept: ANTEPARTUM | Facility: CLINIC | Age: 43
End: 2023-12-07
Payer: MEDICAID

## 2023-12-07 ENCOUNTER — APPOINTMENT (OUTPATIENT)
Dept: OBGYN | Facility: CLINIC | Age: 43
End: 2023-12-07
Payer: MEDICAID

## 2023-12-07 ENCOUNTER — MED ADMIN CHARGE (OUTPATIENT)
Age: 43
End: 2023-12-07

## 2023-12-07 VITALS
OXYGEN SATURATION: 96 % | DIASTOLIC BLOOD PRESSURE: 71 MMHG | TEMPERATURE: 97.7 F | SYSTOLIC BLOOD PRESSURE: 106 MMHG | HEART RATE: 94 BPM | HEIGHT: 63 IN | BODY MASS INDEX: 33.13 KG/M2 | RESPIRATION RATE: 16 BRPM | WEIGHT: 187 LBS

## 2023-12-07 DIAGNOSIS — Z34.00 ENCOUNTER FOR SUPERVISION OF NORMAL FIRST PREGNANCY, UNSPECIFIED TRIMESTER: ICD-10-CM

## 2023-12-07 DIAGNOSIS — Z98.890 OTHER SPECIFIED POSTPROCEDURAL STATES: Chronic | ICD-10-CM

## 2023-12-07 DIAGNOSIS — Z86.19 PERSONAL HISTORY OF OTHER INFECTIOUS AND PARASITIC DISEASES: ICD-10-CM

## 2023-12-07 DIAGNOSIS — Z90.49 ACQUIRED ABSENCE OF OTHER SPECIFIED PARTS OF DIGESTIVE TRACT: Chronic | ICD-10-CM

## 2023-12-07 DIAGNOSIS — Z23 ENCOUNTER FOR IMMUNIZATION: ICD-10-CM

## 2023-12-07 PROCEDURE — 85025 COMPLETE CBC W/AUTO DIFF WBC: CPT

## 2023-12-07 PROCEDURE — 76816 OB US FOLLOW-UP PER FETUS: CPT

## 2023-12-07 PROCEDURE — G0463: CPT

## 2023-12-07 PROCEDURE — 86780 TREPONEMA PALLIDUM: CPT

## 2023-12-07 PROCEDURE — 99213 OFFICE O/P EST LOW 20 MIN: CPT

## 2023-12-07 PROCEDURE — 82950 GLUCOSE TEST: CPT

## 2023-12-07 PROCEDURE — 81003 URINALYSIS AUTO W/O SCOPE: CPT

## 2023-12-07 PROCEDURE — 36415 COLL VENOUS BLD VENIPUNCTURE: CPT

## 2023-12-08 LAB
BASOPHILS # BLD AUTO: 0.01 K/UL
BASOPHILS NFR BLD AUTO: 0.1 %
EOSINOPHIL # BLD AUTO: 0.07 K/UL
EOSINOPHIL NFR BLD AUTO: 0.8 %
HCT VFR BLD CALC: 33.6 %
HGB BLD-MCNC: 11.1 G/DL
IMM GRANULOCYTES NFR BLD AUTO: 0.4 %
LYMPHOCYTES # BLD AUTO: 2.34 K/UL
LYMPHOCYTES NFR BLD AUTO: 26.1 %
MAN DIFF?: NORMAL
MCHC RBC-ENTMCNC: 30.7 PG
MCHC RBC-ENTMCNC: 33 GM/DL
MCV RBC AUTO: 93.1 FL
MONOCYTES # BLD AUTO: 0.37 K/UL
MONOCYTES NFR BLD AUTO: 4.1 %
NEUTROPHILS # BLD AUTO: 6.13 K/UL
NEUTROPHILS NFR BLD AUTO: 68.5 %
PLATELET # BLD AUTO: 310 K/UL
RBC # BLD: 3.61 M/UL
RBC # FLD: 13 %
T PALLIDUM AB SER QL IA: NEGATIVE
WBC # FLD AUTO: 8.96 K/UL

## 2023-12-11 ENCOUNTER — APPOINTMENT (OUTPATIENT)
Age: 43
End: 2023-12-11

## 2023-12-11 ENCOUNTER — NON-APPOINTMENT (OUTPATIENT)
Age: 43
End: 2023-12-11

## 2023-12-11 DIAGNOSIS — Z3A.28 28 WEEKS GESTATION OF PREGNANCY: ICD-10-CM

## 2023-12-11 DIAGNOSIS — O09.899 SUPERVISION OF OTHER HIGH RISK PREGNANCIES, UNSPECIFIED TRIMESTER: ICD-10-CM

## 2023-12-11 DIAGNOSIS — O09.529 SUPERVISION OF ELDERLY MULTIGRAVIDA, UNSPECIFIED TRIMESTER: ICD-10-CM

## 2023-12-11 DIAGNOSIS — Z87.59 PERSONAL HISTORY OF OTHER COMPLICATIONS OF PREGNANCY, CHILDBIRTH AND THE PUERPERIUM: ICD-10-CM

## 2023-12-11 LAB — GLUCOSE 1H P 50 G GLC PO SERPL-MCNC: 153 MG/DL

## 2023-12-14 ENCOUNTER — APPOINTMENT (OUTPATIENT)
Age: 43
End: 2023-12-14
Payer: COMMERCIAL

## 2023-12-14 PROCEDURE — D3220: CPT

## 2023-12-20 ENCOUNTER — NON-APPOINTMENT (OUTPATIENT)
Age: 43
End: 2023-12-20

## 2023-12-20 ENCOUNTER — EMERGENCY (EMERGENCY)
Facility: HOSPITAL | Age: 43
LOS: 1 days | Discharge: HOME | End: 2023-12-20
Attending: EMERGENCY MEDICINE | Admitting: EMERGENCY MEDICINE
Payer: MEDICAID

## 2023-12-20 ENCOUNTER — APPOINTMENT (OUTPATIENT)
Age: 43
End: 2023-12-20

## 2023-12-20 VITALS
OXYGEN SATURATION: 100 % | SYSTOLIC BLOOD PRESSURE: 108 MMHG | DIASTOLIC BLOOD PRESSURE: 68 MMHG | HEART RATE: 76 BPM | TEMPERATURE: 98 F | RESPIRATION RATE: 16 BRPM

## 2023-12-20 VITALS
DIASTOLIC BLOOD PRESSURE: 68 MMHG | OXYGEN SATURATION: 100 % | SYSTOLIC BLOOD PRESSURE: 109 MMHG | RESPIRATION RATE: 17 BRPM | TEMPERATURE: 98 F | HEART RATE: 90 BPM

## 2023-12-20 DIAGNOSIS — Z98.890 OTHER SPECIFIED POSTPROCEDURAL STATES: Chronic | ICD-10-CM

## 2023-12-20 DIAGNOSIS — Z90.49 ACQUIRED ABSENCE OF OTHER SPECIFIED PARTS OF DIGESTIVE TRACT: Chronic | ICD-10-CM

## 2023-12-20 LAB
ALBUMIN SERPL ELPH-MCNC: 3.3 G/DL — SIGNIFICANT CHANGE UP (ref 3.3–5)
ALBUMIN SERPL ELPH-MCNC: 3.3 G/DL — SIGNIFICANT CHANGE UP (ref 3.3–5)
ALP SERPL-CCNC: 106 U/L — SIGNIFICANT CHANGE UP (ref 40–120)
ALP SERPL-CCNC: 106 U/L — SIGNIFICANT CHANGE UP (ref 40–120)
ALT FLD-CCNC: 19 U/L — SIGNIFICANT CHANGE UP (ref 4–33)
ALT FLD-CCNC: 19 U/L — SIGNIFICANT CHANGE UP (ref 4–33)
ANION GAP SERPL CALC-SCNC: 10 MMOL/L — SIGNIFICANT CHANGE UP (ref 7–14)
ANION GAP SERPL CALC-SCNC: 10 MMOL/L — SIGNIFICANT CHANGE UP (ref 7–14)
AST SERPL-CCNC: 16 U/L — SIGNIFICANT CHANGE UP (ref 4–32)
AST SERPL-CCNC: 16 U/L — SIGNIFICANT CHANGE UP (ref 4–32)
BASOPHILS # BLD AUTO: 0.01 K/UL — SIGNIFICANT CHANGE UP (ref 0–0.2)
BASOPHILS # BLD AUTO: 0.01 K/UL — SIGNIFICANT CHANGE UP (ref 0–0.2)
BASOPHILS NFR BLD AUTO: 0.1 % — SIGNIFICANT CHANGE UP (ref 0–2)
BASOPHILS NFR BLD AUTO: 0.1 % — SIGNIFICANT CHANGE UP (ref 0–2)
BILIRUB DIRECT SERPL-MCNC: <0.2 MG/DL — SIGNIFICANT CHANGE UP (ref 0–0.3)
BILIRUB DIRECT SERPL-MCNC: <0.2 MG/DL — SIGNIFICANT CHANGE UP (ref 0–0.3)
BILIRUB INDIRECT FLD-MCNC: >0 MG/DL — SIGNIFICANT CHANGE UP (ref 0–1)
BILIRUB INDIRECT FLD-MCNC: >0 MG/DL — SIGNIFICANT CHANGE UP (ref 0–1)
BILIRUB SERPL-MCNC: 0.2 MG/DL — SIGNIFICANT CHANGE UP (ref 0.2–1.2)
BILIRUB SERPL-MCNC: 0.2 MG/DL — SIGNIFICANT CHANGE UP (ref 0.2–1.2)
BUN SERPL-MCNC: 6 MG/DL — LOW (ref 7–23)
BUN SERPL-MCNC: 6 MG/DL — LOW (ref 7–23)
CALCIUM SERPL-MCNC: 9.2 MG/DL — SIGNIFICANT CHANGE UP (ref 8.4–10.5)
CALCIUM SERPL-MCNC: 9.2 MG/DL — SIGNIFICANT CHANGE UP (ref 8.4–10.5)
CHLORIDE SERPL-SCNC: 105 MMOL/L — SIGNIFICANT CHANGE UP (ref 98–107)
CHLORIDE SERPL-SCNC: 105 MMOL/L — SIGNIFICANT CHANGE UP (ref 98–107)
CO2 SERPL-SCNC: 23 MMOL/L — SIGNIFICANT CHANGE UP (ref 22–31)
CO2 SERPL-SCNC: 23 MMOL/L — SIGNIFICANT CHANGE UP (ref 22–31)
CREAT SERPL-MCNC: 0.46 MG/DL — LOW (ref 0.5–1.3)
CREAT SERPL-MCNC: 0.46 MG/DL — LOW (ref 0.5–1.3)
EGFR: 123 ML/MIN/1.73M2 — SIGNIFICANT CHANGE UP
EGFR: 123 ML/MIN/1.73M2 — SIGNIFICANT CHANGE UP
EOSINOPHIL # BLD AUTO: 0.06 K/UL — SIGNIFICANT CHANGE UP (ref 0–0.5)
EOSINOPHIL # BLD AUTO: 0.06 K/UL — SIGNIFICANT CHANGE UP (ref 0–0.5)
EOSINOPHIL NFR BLD AUTO: 0.5 % — SIGNIFICANT CHANGE UP (ref 0–6)
EOSINOPHIL NFR BLD AUTO: 0.5 % — SIGNIFICANT CHANGE UP (ref 0–6)
GLUCOSE SERPL-MCNC: 98 MG/DL — SIGNIFICANT CHANGE UP (ref 70–99)
GLUCOSE SERPL-MCNC: 98 MG/DL — SIGNIFICANT CHANGE UP (ref 70–99)
HCT VFR BLD CALC: 32 % — LOW (ref 34.5–45)
HCT VFR BLD CALC: 32 % — LOW (ref 34.5–45)
HGB BLD-MCNC: 10.7 G/DL — LOW (ref 11.5–15.5)
HGB BLD-MCNC: 10.7 G/DL — LOW (ref 11.5–15.5)
IANC: 7.76 K/UL — HIGH (ref 1.8–7.4)
IANC: 7.76 K/UL — HIGH (ref 1.8–7.4)
IMM GRANULOCYTES NFR BLD AUTO: 0.5 % — SIGNIFICANT CHANGE UP (ref 0–0.9)
IMM GRANULOCYTES NFR BLD AUTO: 0.5 % — SIGNIFICANT CHANGE UP (ref 0–0.9)
LDH SERPL L TO P-CCNC: 145 U/L — SIGNIFICANT CHANGE UP (ref 135–225)
LDH SERPL L TO P-CCNC: 145 U/L — SIGNIFICANT CHANGE UP (ref 135–225)
LYMPHOCYTES # BLD AUTO: 2.64 K/UL — SIGNIFICANT CHANGE UP (ref 1–3.3)
LYMPHOCYTES # BLD AUTO: 2.64 K/UL — SIGNIFICANT CHANGE UP (ref 1–3.3)
LYMPHOCYTES # BLD AUTO: 24 % — SIGNIFICANT CHANGE UP (ref 13–44)
LYMPHOCYTES # BLD AUTO: 24 % — SIGNIFICANT CHANGE UP (ref 13–44)
MAGNESIUM SERPL-MCNC: 1.9 MG/DL — SIGNIFICANT CHANGE UP (ref 1.6–2.6)
MAGNESIUM SERPL-MCNC: 1.9 MG/DL — SIGNIFICANT CHANGE UP (ref 1.6–2.6)
MCHC RBC-ENTMCNC: 30.1 PG — SIGNIFICANT CHANGE UP (ref 27–34)
MCHC RBC-ENTMCNC: 30.1 PG — SIGNIFICANT CHANGE UP (ref 27–34)
MCHC RBC-ENTMCNC: 33.4 GM/DL — SIGNIFICANT CHANGE UP (ref 32–36)
MCHC RBC-ENTMCNC: 33.4 GM/DL — SIGNIFICANT CHANGE UP (ref 32–36)
MCV RBC AUTO: 90.1 FL — SIGNIFICANT CHANGE UP (ref 80–100)
MCV RBC AUTO: 90.1 FL — SIGNIFICANT CHANGE UP (ref 80–100)
MONOCYTES # BLD AUTO: 0.47 K/UL — SIGNIFICANT CHANGE UP (ref 0–0.9)
MONOCYTES # BLD AUTO: 0.47 K/UL — SIGNIFICANT CHANGE UP (ref 0–0.9)
MONOCYTES NFR BLD AUTO: 4.3 % — SIGNIFICANT CHANGE UP (ref 2–14)
MONOCYTES NFR BLD AUTO: 4.3 % — SIGNIFICANT CHANGE UP (ref 2–14)
NEUTROPHILS # BLD AUTO: 7.76 K/UL — HIGH (ref 1.8–7.4)
NEUTROPHILS # BLD AUTO: 7.76 K/UL — HIGH (ref 1.8–7.4)
NEUTROPHILS NFR BLD AUTO: 70.6 % — SIGNIFICANT CHANGE UP (ref 43–77)
NEUTROPHILS NFR BLD AUTO: 70.6 % — SIGNIFICANT CHANGE UP (ref 43–77)
NRBC # BLD: 0 /100 WBCS — SIGNIFICANT CHANGE UP (ref 0–0)
NRBC # BLD: 0 /100 WBCS — SIGNIFICANT CHANGE UP (ref 0–0)
NRBC # FLD: 0 K/UL — SIGNIFICANT CHANGE UP (ref 0–0)
NRBC # FLD: 0 K/UL — SIGNIFICANT CHANGE UP (ref 0–0)
NT-PROBNP SERPL-SCNC: <36 PG/ML — SIGNIFICANT CHANGE UP
NT-PROBNP SERPL-SCNC: <36 PG/ML — SIGNIFICANT CHANGE UP
PLATELET # BLD AUTO: 293 K/UL — SIGNIFICANT CHANGE UP (ref 150–400)
PLATELET # BLD AUTO: 293 K/UL — SIGNIFICANT CHANGE UP (ref 150–400)
POTASSIUM SERPL-MCNC: 4 MMOL/L — SIGNIFICANT CHANGE UP (ref 3.5–5.3)
POTASSIUM SERPL-MCNC: 4 MMOL/L — SIGNIFICANT CHANGE UP (ref 3.5–5.3)
POTASSIUM SERPL-SCNC: 4 MMOL/L — SIGNIFICANT CHANGE UP (ref 3.5–5.3)
POTASSIUM SERPL-SCNC: 4 MMOL/L — SIGNIFICANT CHANGE UP (ref 3.5–5.3)
PROT SERPL-MCNC: 6.7 G/DL — SIGNIFICANT CHANGE UP (ref 6–8.3)
PROT SERPL-MCNC: 6.7 G/DL — SIGNIFICANT CHANGE UP (ref 6–8.3)
RBC # BLD: 3.55 M/UL — LOW (ref 3.8–5.2)
RBC # BLD: 3.55 M/UL — LOW (ref 3.8–5.2)
RBC # FLD: 13.7 % — SIGNIFICANT CHANGE UP (ref 10.3–14.5)
RBC # FLD: 13.7 % — SIGNIFICANT CHANGE UP (ref 10.3–14.5)
SODIUM SERPL-SCNC: 138 MMOL/L — SIGNIFICANT CHANGE UP (ref 135–145)
SODIUM SERPL-SCNC: 138 MMOL/L — SIGNIFICANT CHANGE UP (ref 135–145)
URATE SERPL-MCNC: 3.9 MG/DL — SIGNIFICANT CHANGE UP (ref 2.5–7)
URATE SERPL-MCNC: 3.9 MG/DL — SIGNIFICANT CHANGE UP (ref 2.5–7)
WBC # BLD: 10.99 K/UL — HIGH (ref 3.8–10.5)
WBC # BLD: 10.99 K/UL — HIGH (ref 3.8–10.5)
WBC # FLD AUTO: 10.99 K/UL — HIGH (ref 3.8–10.5)
WBC # FLD AUTO: 10.99 K/UL — HIGH (ref 3.8–10.5)

## 2023-12-20 PROCEDURE — 93970 EXTREMITY STUDY: CPT | Mod: 26

## 2023-12-20 PROCEDURE — 93010 ELECTROCARDIOGRAM REPORT: CPT

## 2023-12-20 PROCEDURE — 99285 EMERGENCY DEPT VISIT HI MDM: CPT

## 2023-12-20 PROCEDURE — 76815 OB US LIMITED FETUS(S): CPT | Mod: 26

## 2023-12-20 RX ORDER — ACETAMINOPHEN 500 MG
650 TABLET ORAL ONCE
Refills: 0 | Status: COMPLETED | OUTPATIENT
Start: 2023-12-20 | End: 2023-12-20

## 2023-12-20 RX ADMIN — Medication 650 MILLIGRAM(S): at 21:12

## 2023-12-20 NOTE — PROVIDER CONTACT NOTE (OTHER) - BACKGROUND
pt denies cramping, vaginal bleeding, dizziness, lightheadedness, blurry vision, N/V, pt reporting feeling normal fetal movements. pt VSS

## 2023-12-20 NOTE — CHART NOTE - NSCHARTNOTEFT_GEN_A_CORE
NST: 140/mod/+acels  [place]: no ctx     No evidence of fetal distress   Ofelia Machado, PGY-4 NST: 140/mod/+acels  [place]: no ctx     No evidence of fetal distress   Ofelia Machado, PGY-4

## 2023-12-20 NOTE — ED PROVIDER NOTE - MUSCULOSKELETAL, MLM
Spine appears normal, range of motion is not limited, no muscle or joint tenderness +1 mild pitting edema lateral ankle

## 2023-12-20 NOTE — ED ADULT TRIAGE NOTE - CHIEF COMPLAINT QUOTE
Pt presents 27 weeks pregnant c/o foot swelling noticed this morning. Pt denies trouble breathing. Denies pregnancy problems.

## 2023-12-20 NOTE — ED PROVIDER NOTE - NSFOLLOWUPINSTRUCTIONS_ED_ALL_ED_FT
FOLLOW UP WITH YOUR  OB DOCTOR WITHIN 1 WEEK  BRING THE COPIES OF YOUR RESULTS WITH YOU (PROVIDED)  CAN TAKE TYLENOL 650MG ORALLY EVERY 6 HOURS FOR PAIN OR FEVER.  RETURN TO ED FOR NEW OR WORSENING SYMPTOMS. FOLLOW UP WITH YOUR  OB DOCTOR WITHIN 1 WEEK  CAN FOLLOW UP WITH CARDIOLOGY FOR EDEMA WORKUP  BRING THE COPIES OF YOUR RESULTS WITH YOU (PROVIDED)  CAN TAKE TYLENOL 650MG ORALLY EVERY 6 HOURS FOR PAIN OR FEVER.  RETURN TO ED FOR NEW OR WORSENING SYMPTOMS.

## 2023-12-20 NOTE — ED PROVIDER NOTE - PATIENT PORTAL LINK FT
You can access the FollowMyHealth Patient Portal offered by [facility] by registering at the following website: [email or web address]. By joining [facility]’s FollowMyHealth portal, you will also be able to view your health information using other applications (apps) compatible with our system. You can access the FollowMyHealth Patient Portal offered by [facility] by registering at the following website: [email or web address]. By joining [facility]’s FollowMyHealth portal, you will also be able to view your health information using other applications (apps) compatible with our system.

## 2023-12-20 NOTE — ED PROVIDER NOTE - CLINICAL SUMMARY MEDICAL DECISION MAKING FREE TEXT BOX
Patient presents emergency department with bilateral pedal edema did not improve after elevation but has seemed to be improved.  No abdominal pain no vaginal bleeding no chest pain patient did have recent travel but no calf swelling or signs of a pulmonary bliss.  Patient states she fell like she had elevated blood pressure at home but did not have a chance to take her blood pressure here she is not in preeclampsia levels she is 27 weeks pregnant after that she will evaluate with bedside ultrasound which showed fetal heart rate normal.

## 2023-12-20 NOTE — ED PROVIDER NOTE - SKIN NEGATIVE STATEMENT, MLM
no abrasions, no jaundice, no lesions, no pruritis, and no rashes. +b/l mild ankle edema, no ttp over calf or [place]. no abrasions, no jaundice, no lesions, no pruritis, and no rashes. +b/l mild ankle edema, no ttp over calf or [place].

## 2023-12-20 NOTE — ED PROVIDER NOTE - PROGRESS NOTE DETAILS
DD ED ATTG: rec'd s/o from Dr Levine - 43F 28 wk pregnant p/w bilat pedal edema.  Recent long drive.  Feet and ankle edema.  Bedside 's.  US DVT neg.  Will d/w OB re:  need for NST or not.  No mention of pelvic complaint. DD ED ATTG:  NST reassuring.  Ok for d/c home f/u PMD.  Pt can follow up with cards or OB for any further edema w/u.

## 2023-12-20 NOTE — ED ADULT NURSE REASSESSMENT NOTE - NS ED NURSE REASSESS COMMENT FT1
Pt has 9 year old son at bedside, Pt is aware that she cannot have child with her at bedside per hospital policy. Pt has someone on the way to pick child up. DORA Salcedo aware.     Pt is vitally stable, awaiting ultrasound, and offers no complaints at present time.

## 2023-12-20 NOTE — ED ADULT NURSE NOTE - OBJECTIVE STATEMENT
pt received to intake. pt is AxO 3 and ambulatory. pt c/o bilateral foot swelling since this morning in her legs bilaterally. pt endorses returning form a 14hr drive. back to [place]. Pt is 27 weeks pregnant. pt endorses no hx of pregnancy issue's, and was instructed to come to the ER by her provider for further evaluation. Swelling noted on assessment. +pulses bilaterally, and no discoloration to the extremities.  Pt respirations even and unlabored. pt denies headaches, dizziness, n/v/d, abdominal pain, SOB, chest pain, fevers, or body aches. pt awaiting ultrasound. plan of care ongoing.

## 2023-12-20 NOTE — ED PROVIDER NOTE - CARE PLAN
1 Principal Discharge DX:	Pedal edema   Principal Discharge DX:	Pedal edema  Secondary Diagnosis:	Pregnancy

## 2023-12-20 NOTE — ED PROVIDER NOTE - OBJECTIVE STATEMENT
43-year-old female G5, P4 presents to the emergency department with bilateral ankle swelling and intermittent episodes of vision changes.  Patient states while she was unable to take her blood pressure at home she felt like her blood pressure was elevated.  Patient otherwise denies any chest pain shortness of breath abdominal pain.  States she feels movement.  Was came to emerged part for evaluation due to concern for baby.Patient does admit to driving from [place] for 15 hours but denies any calf pain or thigh pain or chest pain or shortness of breath.  Only became concerned because of bilateral ankle swelling that did not improve after elevating her legs.

## 2023-12-21 ENCOUNTER — OUTPATIENT (OUTPATIENT)
Dept: OUTPATIENT SERVICES | Facility: HOSPITAL | Age: 43
LOS: 1 days | End: 2023-12-21
Payer: MEDICAID

## 2023-12-21 ENCOUNTER — APPOINTMENT (OUTPATIENT)
Dept: OBGYN | Facility: CLINIC | Age: 43
End: 2023-12-21
Payer: MEDICAID

## 2023-12-21 VITALS
RESPIRATION RATE: 16 BRPM | BODY MASS INDEX: 33.84 KG/M2 | SYSTOLIC BLOOD PRESSURE: 108 MMHG | DIASTOLIC BLOOD PRESSURE: 69 MMHG | OXYGEN SATURATION: 97 % | HEIGHT: 63 IN | TEMPERATURE: 98.2 F | HEART RATE: 98 BPM | WEIGHT: 191 LBS

## 2023-12-21 DIAGNOSIS — Z34.00 ENCOUNTER FOR SUPERVISION OF NORMAL FIRST PREGNANCY, UNSPECIFIED TRIMESTER: ICD-10-CM

## 2023-12-21 DIAGNOSIS — Z98.890 OTHER SPECIFIED POSTPROCEDURAL STATES: Chronic | ICD-10-CM

## 2023-12-21 DIAGNOSIS — Z90.49 ACQUIRED ABSENCE OF OTHER SPECIFIED PARTS OF DIGESTIVE TRACT: Chronic | ICD-10-CM

## 2023-12-21 PROCEDURE — 99213 OFFICE O/P EST LOW 20 MIN: CPT

## 2023-12-21 PROCEDURE — G0463: CPT

## 2023-12-21 PROCEDURE — 81003 URINALYSIS AUTO W/O SCOPE: CPT

## 2023-12-21 RX ORDER — BLOOD-GLUCOSE METER
KIT MISCELLANEOUS
Qty: 1 | Refills: 0 | Status: ACTIVE | COMMUNITY
Start: 2023-12-21 | End: 1900-01-01

## 2023-12-21 RX ORDER — LANCETS
EACH MISCELLANEOUS
Qty: 1 | Refills: 6 | Status: ACTIVE | COMMUNITY
Start: 2023-12-21 | End: 1900-01-01

## 2023-12-21 RX ORDER — ISOPROPYL ALCOHOL 0.7 ML/ML
SWAB TOPICAL
Qty: 1 | Refills: 6 | Status: ACTIVE | COMMUNITY
Start: 2023-12-21 | End: 1900-01-01

## 2023-12-26 NOTE — REASON FOR VISIT
.   [Initial Eval - Existing Diagnosis] : an initial evaluation of an existing diagnosis [Follow-Up] : a follow-up visit

## 2023-12-27 ENCOUNTER — APPOINTMENT (OUTPATIENT)
Dept: ANTEPARTUM | Facility: CLINIC | Age: 43
End: 2023-12-27
Payer: MEDICAID

## 2023-12-27 ENCOUNTER — ASOB RESULT (OUTPATIENT)
Age: 43
End: 2023-12-27

## 2023-12-27 DIAGNOSIS — Z87.59 PERSONAL HISTORY OF OTHER COMPLICATIONS OF PREGNANCY, CHILDBIRTH AND THE PUERPERIUM: ICD-10-CM

## 2023-12-27 DIAGNOSIS — O09.899 SUPERVISION OF OTHER HIGH RISK PREGNANCIES, UNSPECIFIED TRIMESTER: ICD-10-CM

## 2023-12-27 DIAGNOSIS — Z3A.30 30 WEEKS GESTATION OF PREGNANCY: ICD-10-CM

## 2023-12-27 PROCEDURE — 76819 FETAL BIOPHYS PROFIL W/O NST: CPT | Mod: 59

## 2023-12-27 PROCEDURE — 76816 OB US FOLLOW-UP PER FETUS: CPT

## 2024-01-02 ENCOUNTER — APPOINTMENT (OUTPATIENT)
Dept: CARDIOLOGY | Facility: CLINIC | Age: 44
End: 2024-01-02
Payer: MEDICAID

## 2024-01-02 ENCOUNTER — NON-APPOINTMENT (OUTPATIENT)
Age: 44
End: 2024-01-02

## 2024-01-02 VITALS
DIASTOLIC BLOOD PRESSURE: 58 MMHG | OXYGEN SATURATION: 99 % | BODY MASS INDEX: 34.02 KG/M2 | HEIGHT: 63 IN | SYSTOLIC BLOOD PRESSURE: 98 MMHG | HEART RATE: 98 BPM | WEIGHT: 192 LBS

## 2024-01-02 DIAGNOSIS — R60.9 EDEMA, UNSPECIFIED: ICD-10-CM

## 2024-01-02 PROCEDURE — 93000 ELECTROCARDIOGRAM COMPLETE: CPT

## 2024-01-02 PROCEDURE — 99204 OFFICE O/P NEW MOD 45 MIN: CPT | Mod: 25

## 2024-01-02 NOTE — HISTORY OF PRESENT ILLNESS
[FreeTextEntry1] : 43 year old woman  who is 30 weeks pregnant and here for evaluation of edema. She states that she develop the edema of her legs. She drove from [place] in a car- and dveloped swelling She went to ED and was monitored and ruled out for DVT

## 2024-01-02 NOTE — DISCUSSION/SUMMARY
[FreeTextEntry1] : 43 year old woman  who is 30 weeks pregnant and here for evaluation of edema. She states that she develop the edema of her legs. She drove from [place] in a car- and dveloped swelling She went to ED and was monitored and ruled out for DVT Will check TTE FU thereafter [EKG obtained to assist in diagnosis and management of assessed problem(s)] : EKG obtained to assist in diagnosis and management of assessed problem(s)

## 2024-01-03 ENCOUNTER — ASOB RESULT (OUTPATIENT)
Age: 44
End: 2024-01-03

## 2024-01-03 ENCOUNTER — APPOINTMENT (OUTPATIENT)
Dept: ANTEPARTUM | Facility: CLINIC | Age: 44
End: 2024-01-03
Payer: MEDICAID

## 2024-01-03 ENCOUNTER — APPOINTMENT (OUTPATIENT)
Dept: OBGYN | Facility: CLINIC | Age: 44
End: 2024-01-03
Payer: MEDICAID

## 2024-01-03 ENCOUNTER — OUTPATIENT (OUTPATIENT)
Dept: OUTPATIENT SERVICES | Facility: HOSPITAL | Age: 44
LOS: 1 days | End: 2024-01-03
Payer: MEDICAID

## 2024-01-03 VITALS
HEART RATE: 103 BPM | SYSTOLIC BLOOD PRESSURE: 104 MMHG | WEIGHT: 192 LBS | HEIGHT: 63 IN | RESPIRATION RATE: 16 BRPM | BODY MASS INDEX: 34.02 KG/M2 | OXYGEN SATURATION: 99 % | DIASTOLIC BLOOD PRESSURE: 69 MMHG | TEMPERATURE: 98.1 F

## 2024-01-03 DIAGNOSIS — Z98.890 OTHER SPECIFIED POSTPROCEDURAL STATES: Chronic | ICD-10-CM

## 2024-01-03 DIAGNOSIS — Z90.49 ACQUIRED ABSENCE OF OTHER SPECIFIED PARTS OF DIGESTIVE TRACT: Chronic | ICD-10-CM

## 2024-01-03 DIAGNOSIS — Z34.00 ENCOUNTER FOR SUPERVISION OF NORMAL FIRST PREGNANCY, UNSPECIFIED TRIMESTER: ICD-10-CM

## 2024-01-03 PROCEDURE — 81003 URINALYSIS AUTO W/O SCOPE: CPT

## 2024-01-03 PROCEDURE — G0463: CPT

## 2024-01-03 PROCEDURE — 76818 FETAL BIOPHYS PROFILE W/NST: CPT

## 2024-01-03 PROCEDURE — 99213 OFFICE O/P EST LOW 20 MIN: CPT

## 2024-01-03 RX ORDER — BLOOD SUGAR DIAGNOSTIC
STRIP MISCELLANEOUS 4 TIMES DAILY
Qty: 1 | Refills: 6 | Status: ACTIVE | COMMUNITY
Start: 2023-12-21 | End: 1900-01-01

## 2024-01-04 ENCOUNTER — APPOINTMENT (OUTPATIENT)
Dept: ANTEPARTUM | Facility: CLINIC | Age: 44
End: 2024-01-04

## 2024-01-05 DIAGNOSIS — Z3A.31 31 WEEKS GESTATION OF PREGNANCY: ICD-10-CM

## 2024-01-05 DIAGNOSIS — O09.899 SUPERVISION OF OTHER HIGH RISK PREGNANCIES, UNSPECIFIED TRIMESTER: ICD-10-CM

## 2024-01-05 DIAGNOSIS — O09.529 SUPERVISION OF ELDERLY MULTIGRAVIDA, UNSPECIFIED TRIMESTER: ICD-10-CM

## 2024-01-11 ENCOUNTER — ASOB RESULT (OUTPATIENT)
Age: 44
End: 2024-01-11

## 2024-01-11 ENCOUNTER — APPOINTMENT (OUTPATIENT)
Dept: ANTEPARTUM | Facility: CLINIC | Age: 44
End: 2024-01-11
Payer: MEDICAID

## 2024-01-11 PROCEDURE — 76818 FETAL BIOPHYS PROFILE W/NST: CPT

## 2024-01-12 ENCOUNTER — APPOINTMENT (OUTPATIENT)
Age: 44
End: 2024-01-12
Payer: COMMERCIAL

## 2024-01-12 PROCEDURE — D0220: CPT

## 2024-01-12 PROCEDURE — D0140: CPT

## 2024-01-18 ENCOUNTER — NON-APPOINTMENT (OUTPATIENT)
Age: 44
End: 2024-01-18

## 2024-01-18 ENCOUNTER — OUTPATIENT (OUTPATIENT)
Dept: OUTPATIENT SERVICES | Facility: HOSPITAL | Age: 44
LOS: 1 days | End: 2024-01-18
Payer: MEDICAID

## 2024-01-18 ENCOUNTER — APPOINTMENT (OUTPATIENT)
Dept: ANTEPARTUM | Facility: CLINIC | Age: 44
End: 2024-01-18
Payer: MEDICAID

## 2024-01-18 ENCOUNTER — ASOB RESULT (OUTPATIENT)
Age: 44
End: 2024-01-18

## 2024-01-18 ENCOUNTER — APPOINTMENT (OUTPATIENT)
Dept: OBGYN | Facility: CLINIC | Age: 44
End: 2024-01-18
Payer: MEDICAID

## 2024-01-18 VITALS
BODY MASS INDEX: 33.66 KG/M2 | DIASTOLIC BLOOD PRESSURE: 59 MMHG | SYSTOLIC BLOOD PRESSURE: 98 MMHG | TEMPERATURE: 98.1 F | WEIGHT: 190 LBS | HEIGHT: 63 IN | HEART RATE: 96 BPM | OXYGEN SATURATION: 96 %

## 2024-01-18 DIAGNOSIS — Z98.890 OTHER SPECIFIED POSTPROCEDURAL STATES: Chronic | ICD-10-CM

## 2024-01-18 DIAGNOSIS — Z90.49 ACQUIRED ABSENCE OF OTHER SPECIFIED PARTS OF DIGESTIVE TRACT: Chronic | ICD-10-CM

## 2024-01-18 DIAGNOSIS — Z34.00 ENCOUNTER FOR SUPERVISION OF NORMAL FIRST PREGNANCY, UNSPECIFIED TRIMESTER: ICD-10-CM

## 2024-01-18 PROCEDURE — 99213 OFFICE O/P EST LOW 20 MIN: CPT

## 2024-01-18 PROCEDURE — 81003 URINALYSIS AUTO W/O SCOPE: CPT

## 2024-01-18 PROCEDURE — 76818 FETAL BIOPHYS PROFILE W/NST: CPT

## 2024-01-18 PROCEDURE — G0463: CPT

## 2024-01-18 PROCEDURE — 87086 URINE CULTURE/COLONY COUNT: CPT

## 2024-01-19 ENCOUNTER — APPOINTMENT (OUTPATIENT)
Age: 44
End: 2024-01-19

## 2024-01-19 DIAGNOSIS — Z34.90 ENCOUNTER FOR SUPERVISION OF NORMAL PREGNANCY, UNSPECIFIED, UNSPECIFIED TRIMESTER: ICD-10-CM

## 2024-01-19 DIAGNOSIS — O09.529 SUPERVISION OF ELDERLY MULTIGRAVIDA, UNSPECIFIED TRIMESTER: ICD-10-CM

## 2024-01-19 DIAGNOSIS — Z87.59 PERSONAL HISTORY OF OTHER COMPLICATIONS OF PREGNANCY, CHILDBIRTH AND THE PUERPERIUM: ICD-10-CM

## 2024-01-19 DIAGNOSIS — O09.899 SUPERVISION OF OTHER HIGH RISK PREGNANCIES, UNSPECIFIED TRIMESTER: ICD-10-CM

## 2024-01-24 LAB — BACTERIA UR CULT: NORMAL

## 2024-01-30 ENCOUNTER — NON-APPOINTMENT (OUTPATIENT)
Age: 44
End: 2024-01-30

## 2024-01-30 ENCOUNTER — APPOINTMENT (OUTPATIENT)
Dept: ANTEPARTUM | Facility: CLINIC | Age: 44
End: 2024-01-30

## 2024-01-30 ENCOUNTER — APPOINTMENT (OUTPATIENT)
Dept: OBGYN | Facility: CLINIC | Age: 44
End: 2024-01-30
Payer: MEDICAID

## 2024-01-30 ENCOUNTER — ASOB RESULT (OUTPATIENT)
Age: 44
End: 2024-01-30

## 2024-01-30 ENCOUNTER — APPOINTMENT (OUTPATIENT)
Dept: ANTEPARTUM | Facility: CLINIC | Age: 44
End: 2024-01-30
Payer: MEDICAID

## 2024-01-30 ENCOUNTER — OUTPATIENT (OUTPATIENT)
Dept: OUTPATIENT SERVICES | Facility: HOSPITAL | Age: 44
LOS: 1 days | End: 2024-01-30
Payer: MEDICAID

## 2024-01-30 VITALS
DIASTOLIC BLOOD PRESSURE: 67 MMHG | HEIGHT: 63 IN | OXYGEN SATURATION: 100 % | BODY MASS INDEX: 33.66 KG/M2 | WEIGHT: 190 LBS | HEART RATE: 82 BPM | TEMPERATURE: 97.7 F | SYSTOLIC BLOOD PRESSURE: 105 MMHG

## 2024-01-30 DIAGNOSIS — Z98.890 OTHER SPECIFIED POSTPROCEDURAL STATES: Chronic | ICD-10-CM

## 2024-01-30 DIAGNOSIS — Z34.00 ENCOUNTER FOR SUPERVISION OF NORMAL FIRST PREGNANCY, UNSPECIFIED TRIMESTER: ICD-10-CM

## 2024-01-30 DIAGNOSIS — Z90.49 ACQUIRED ABSENCE OF OTHER SPECIFIED PARTS OF DIGESTIVE TRACT: Chronic | ICD-10-CM

## 2024-01-30 PROCEDURE — 76818 FETAL BIOPHYS PROFILE W/NST: CPT | Mod: 59

## 2024-01-30 PROCEDURE — 76816 OB US FOLLOW-UP PER FETUS: CPT

## 2024-01-30 PROCEDURE — 87086 URINE CULTURE/COLONY COUNT: CPT

## 2024-01-30 PROCEDURE — G0463: CPT

## 2024-01-30 PROCEDURE — 99213 OFFICE O/P EST LOW 20 MIN: CPT

## 2024-01-30 PROCEDURE — 81003 URINALYSIS AUTO W/O SCOPE: CPT

## 2024-01-30 PROCEDURE — 87077 CULTURE AEROBIC IDENTIFY: CPT

## 2024-01-31 ENCOUNTER — APPOINTMENT (OUTPATIENT)
Dept: MATERNAL FETAL MEDICINE | Facility: CLINIC | Age: 44
End: 2024-01-31

## 2024-01-31 DIAGNOSIS — O09.529 SUPERVISION OF ELDERLY MULTIGRAVIDA, UNSPECIFIED TRIMESTER: ICD-10-CM

## 2024-01-31 DIAGNOSIS — O09.899 SUPERVISION OF OTHER HIGH RISK PREGNANCIES, UNSPECIFIED TRIMESTER: ICD-10-CM

## 2024-01-31 DIAGNOSIS — Z87.59 PERSONAL HISTORY OF OTHER COMPLICATIONS OF PREGNANCY, CHILDBIRTH AND THE PUERPERIUM: ICD-10-CM

## 2024-01-31 DIAGNOSIS — Z3A.35 35 WEEKS GESTATION OF PREGNANCY: ICD-10-CM

## 2024-02-02 ENCOUNTER — APPOINTMENT (OUTPATIENT)
Dept: ANTEPARTUM | Facility: CLINIC | Age: 44
End: 2024-02-02

## 2024-02-02 LAB — BACTERIA UR CULT: ABNORMAL

## 2024-02-06 ENCOUNTER — APPOINTMENT (OUTPATIENT)
Dept: ANTEPARTUM | Facility: CLINIC | Age: 44
End: 2024-02-06
Payer: MEDICAID

## 2024-02-06 ENCOUNTER — ASOB RESULT (OUTPATIENT)
Age: 44
End: 2024-02-06

## 2024-02-06 ENCOUNTER — APPOINTMENT (OUTPATIENT)
Dept: OBGYN | Facility: CLINIC | Age: 44
End: 2024-02-06
Payer: MEDICAID

## 2024-02-06 ENCOUNTER — OUTPATIENT (OUTPATIENT)
Dept: OUTPATIENT SERVICES | Facility: HOSPITAL | Age: 44
LOS: 1 days | End: 2024-02-06
Payer: MEDICAID

## 2024-02-06 VITALS
BODY MASS INDEX: 34.73 KG/M2 | HEART RATE: 88 BPM | HEIGHT: 63 IN | RESPIRATION RATE: 16 BRPM | WEIGHT: 196 LBS | OXYGEN SATURATION: 99 % | SYSTOLIC BLOOD PRESSURE: 105 MMHG | TEMPERATURE: 98.6 F | DIASTOLIC BLOOD PRESSURE: 68 MMHG

## 2024-02-06 DIAGNOSIS — O09.529 SUPERVISION OF ELDERLY MULTIGRAVIDA, UNSPECIFIED TRIMESTER: ICD-10-CM

## 2024-02-06 DIAGNOSIS — Z90.49 ACQUIRED ABSENCE OF OTHER SPECIFIED PARTS OF DIGESTIVE TRACT: Chronic | ICD-10-CM

## 2024-02-06 DIAGNOSIS — Z98.890 OTHER SPECIFIED POSTPROCEDURAL STATES: Chronic | ICD-10-CM

## 2024-02-06 DIAGNOSIS — Z34.00 ENCOUNTER FOR SUPERVISION OF NORMAL FIRST PREGNANCY, UNSPECIFIED TRIMESTER: ICD-10-CM

## 2024-02-06 PROCEDURE — 36415 COLL VENOUS BLD VENIPUNCTURE: CPT

## 2024-02-06 PROCEDURE — 99213 OFFICE O/P EST LOW 20 MIN: CPT

## 2024-02-06 PROCEDURE — 87389 HIV-1 AG W/HIV-1&-2 AB AG IA: CPT

## 2024-02-06 PROCEDURE — 81003 URINALYSIS AUTO W/O SCOPE: CPT

## 2024-02-06 PROCEDURE — 85025 COMPLETE CBC W/AUTO DIFF WBC: CPT

## 2024-02-06 PROCEDURE — 86780 TREPONEMA PALLIDUM: CPT

## 2024-02-06 PROCEDURE — 76818 FETAL BIOPHYS PROFILE W/NST: CPT

## 2024-02-06 PROCEDURE — 87653 STREP B DNA AMP PROBE: CPT

## 2024-02-06 PROCEDURE — 87086 URINE CULTURE/COLONY COUNT: CPT

## 2024-02-06 PROCEDURE — G0463: CPT

## 2024-02-07 ENCOUNTER — ASOB RESULT (OUTPATIENT)
Age: 44
End: 2024-02-07

## 2024-02-07 ENCOUNTER — APPOINTMENT (OUTPATIENT)
Dept: MATERNAL FETAL MEDICINE | Facility: CLINIC | Age: 44
End: 2024-02-07
Payer: MEDICAID

## 2024-02-07 DIAGNOSIS — O09.899 SUPERVISION OF OTHER HIGH RISK PREGNANCIES, UNSPECIFIED TRIMESTER: ICD-10-CM

## 2024-02-07 DIAGNOSIS — O09.529 SUPERVISION OF ELDERLY MULTIGRAVIDA, UNSPECIFIED TRIMESTER: ICD-10-CM

## 2024-02-07 DIAGNOSIS — Z34.90 ENCOUNTER FOR SUPERVISION OF NORMAL PREGNANCY, UNSPECIFIED, UNSPECIFIED TRIMESTER: ICD-10-CM

## 2024-02-07 DIAGNOSIS — Z87.59 PERSONAL HISTORY OF OTHER COMPLICATIONS OF PREGNANCY, CHILDBIRTH AND THE PUERPERIUM: ICD-10-CM

## 2024-02-07 LAB
BASOPHILS # BLD AUTO: 0.01 K/UL
BASOPHILS NFR BLD AUTO: 0.1 %
C TRACH RRNA SPEC QL NAA+PROBE: NOT DETECTED
EOSINOPHIL # BLD AUTO: 0.06 K/UL
EOSINOPHIL NFR BLD AUTO: 0.6 %
HCT VFR BLD CALC: 32.7 %
HGB BLD-MCNC: 10.6 G/DL
HIV1+2 AB SPEC QL IA.RAPID: NONREACTIVE
IMM GRANULOCYTES NFR BLD AUTO: 0.9 %
LYMPHOCYTES # BLD AUTO: 2.47 K/UL
LYMPHOCYTES NFR BLD AUTO: 23.4 %
MAN DIFF?: NORMAL
MCHC RBC-ENTMCNC: 29.6 PG
MCHC RBC-ENTMCNC: 32.4 GM/DL
MCV RBC AUTO: 91.3 FL
MONOCYTES # BLD AUTO: 0.49 K/UL
MONOCYTES NFR BLD AUTO: 4.6 %
N GONORRHOEA RRNA SPEC QL NAA+PROBE: NOT DETECTED
NEUTROPHILS # BLD AUTO: 7.43 K/UL
NEUTROPHILS NFR BLD AUTO: 70.4 %
PLATELET # BLD AUTO: 261 K/UL
RBC # BLD: 3.58 M/UL
RBC # FLD: 16 %
SOURCE AMPLIFICATION: NORMAL
WBC # FLD AUTO: 10.55 K/UL

## 2024-02-07 PROCEDURE — G0109 DIAB MANAGE TRN IND/GROUP: CPT | Mod: 95

## 2024-02-07 RX ORDER — URINE ACETONE TEST STRIPS
STRIP MISCELLANEOUS
Qty: 1 | Refills: 2 | Status: ACTIVE | COMMUNITY
Start: 2024-02-07 | End: 1900-01-01

## 2024-02-08 LAB
BACTERIA UR CULT: NORMAL
GP B STREP DNA SPEC QL NAA+PROBE: NOT DETECTED
SOURCE GBS: NORMAL
T PALLIDUM AB SER QL IA: NEGATIVE

## 2024-02-09 ENCOUNTER — APPOINTMENT (OUTPATIENT)
Dept: ANTEPARTUM | Facility: CLINIC | Age: 44
End: 2024-02-09

## 2024-02-13 ENCOUNTER — APPOINTMENT (OUTPATIENT)
Dept: ANTEPARTUM | Facility: CLINIC | Age: 44
End: 2024-02-13

## 2024-02-13 ENCOUNTER — APPOINTMENT (OUTPATIENT)
Dept: OBGYN | Facility: CLINIC | Age: 44
End: 2024-02-13

## 2024-02-14 ENCOUNTER — EMERGENCY (EMERGENCY)
Facility: HOSPITAL | Age: 44
LOS: 1 days | Discharge: STILL ADMITTED OR ADMINISTRATIVE | End: 2024-02-14
Admitting: EMERGENCY MEDICINE
Payer: MEDICAID

## 2024-02-14 ENCOUNTER — INPATIENT (INPATIENT)
Facility: HOSPITAL | Age: 44
LOS: 0 days | Discharge: HOME | End: 2024-02-15
Attending: SPECIALIST | Admitting: SPECIALIST
Payer: MEDICAID

## 2024-02-14 ENCOUNTER — APPOINTMENT (OUTPATIENT)
Dept: ANTEPARTUM | Facility: CLINIC | Age: 44
End: 2024-02-14

## 2024-02-14 VITALS — RESPIRATION RATE: 18 BRPM | HEART RATE: 113 BPM | TEMPERATURE: 98 F | OXYGEN SATURATION: 96 %

## 2024-02-14 VITALS — TEMPERATURE: 98 F

## 2024-02-14 DIAGNOSIS — Z90.49 ACQUIRED ABSENCE OF OTHER SPECIFIED PARTS OF DIGESTIVE TRACT: Chronic | ICD-10-CM

## 2024-02-14 DIAGNOSIS — Z98.890 OTHER SPECIFIED POSTPROCEDURAL STATES: Chronic | ICD-10-CM

## 2024-02-14 DIAGNOSIS — O26.899 OTHER SPECIFIED PREGNANCY RELATED CONDITIONS, UNSPECIFIED TRIMESTER: ICD-10-CM

## 2024-02-14 LAB
BASOPHILS # BLD AUTO: 0.03 K/UL — SIGNIFICANT CHANGE UP (ref 0–0.2)
BASOPHILS NFR BLD AUTO: 0.2 % — SIGNIFICANT CHANGE UP (ref 0–2)
BLD GP AB SCN SERPL QL: NEGATIVE — SIGNIFICANT CHANGE UP
EOSINOPHIL # BLD AUTO: 0.13 K/UL — SIGNIFICANT CHANGE UP (ref 0–0.5)
EOSINOPHIL NFR BLD AUTO: 0.8 % — SIGNIFICANT CHANGE UP (ref 0–6)
HCT VFR BLD CALC: 35.3 % — SIGNIFICANT CHANGE UP (ref 34.5–45)
HGB BLD-MCNC: 11.7 G/DL — SIGNIFICANT CHANGE UP (ref 11.5–15.5)
IANC: 12.02 K/UL — HIGH (ref 1.8–7.4)
IMM GRANULOCYTES NFR BLD AUTO: 0.6 % — SIGNIFICANT CHANGE UP (ref 0–0.9)
LYMPHOCYTES # BLD AUTO: 19.9 % — SIGNIFICANT CHANGE UP (ref 13–44)
LYMPHOCYTES # BLD AUTO: 3.28 K/UL — SIGNIFICANT CHANGE UP (ref 1–3.3)
MCHC RBC-ENTMCNC: 29.5 PG — SIGNIFICANT CHANGE UP (ref 27–34)
MCHC RBC-ENTMCNC: 33.1 GM/DL — SIGNIFICANT CHANGE UP (ref 32–36)
MCV RBC AUTO: 88.9 FL — SIGNIFICANT CHANGE UP (ref 80–100)
MONOCYTES # BLD AUTO: 0.93 K/UL — HIGH (ref 0–0.9)
MONOCYTES NFR BLD AUTO: 5.6 % — SIGNIFICANT CHANGE UP (ref 2–14)
NEUTROPHILS # BLD AUTO: 12.02 K/UL — HIGH (ref 1.8–7.4)
NEUTROPHILS NFR BLD AUTO: 72.9 % — SIGNIFICANT CHANGE UP (ref 43–77)
NRBC # BLD: 0 /100 WBCS — SIGNIFICANT CHANGE UP (ref 0–0)
NRBC # FLD: 0 K/UL — SIGNIFICANT CHANGE UP (ref 0–0)
PLATELET # BLD AUTO: 310 K/UL — SIGNIFICANT CHANGE UP (ref 150–400)
RBC # BLD: 3.97 M/UL — SIGNIFICANT CHANGE UP (ref 3.8–5.2)
RBC # FLD: 15.9 % — HIGH (ref 10.3–14.5)
RH IG SCN BLD-IMP: POSITIVE — SIGNIFICANT CHANGE UP
T PALLIDUM AB TITR SER: NEGATIVE — SIGNIFICANT CHANGE UP
WBC # BLD: 16.49 K/UL — HIGH (ref 3.8–10.5)
WBC # FLD AUTO: 16.49 K/UL — HIGH (ref 3.8–10.5)

## 2024-02-14 PROCEDURE — L9996: CPT

## 2024-02-14 PROCEDURE — 59409 OBSTETRICAL CARE: CPT | Mod: U9,GC

## 2024-02-14 RX ORDER — CHLORHEXIDINE GLUCONATE 213 G/1000ML
1 SOLUTION TOPICAL DAILY
Refills: 0 | Status: DISCONTINUED | OUTPATIENT
Start: 2024-02-14 | End: 2024-02-14

## 2024-02-14 RX ORDER — IBUPROFEN 200 MG
600 TABLET ORAL EVERY 6 HOURS
Refills: 0 | Status: COMPLETED | OUTPATIENT
Start: 2024-02-14 | End: 2025-01-12

## 2024-02-14 RX ORDER — LANOLIN
1 OINTMENT (GRAM) TOPICAL EVERY 6 HOURS
Refills: 0 | Status: DISCONTINUED | OUTPATIENT
Start: 2024-02-14 | End: 2024-02-15

## 2024-02-14 RX ORDER — OXYCODONE HYDROCHLORIDE 5 MG/1
5 TABLET ORAL ONCE
Refills: 0 | Status: DISCONTINUED | OUTPATIENT
Start: 2024-02-14 | End: 2024-02-15

## 2024-02-14 RX ORDER — OXYCODONE HYDROCHLORIDE 5 MG/1
5 TABLET ORAL
Refills: 0 | Status: DISCONTINUED | OUTPATIENT
Start: 2024-02-14 | End: 2024-02-15

## 2024-02-14 RX ORDER — SODIUM CHLORIDE 9 MG/ML
3 INJECTION INTRAMUSCULAR; INTRAVENOUS; SUBCUTANEOUS EVERY 8 HOURS
Refills: 0 | Status: DISCONTINUED | OUTPATIENT
Start: 2024-02-14 | End: 2024-02-15

## 2024-02-14 RX ORDER — BENZOCAINE 10 %
1 GEL (GRAM) MUCOUS MEMBRANE EVERY 6 HOURS
Refills: 0 | Status: DISCONTINUED | OUTPATIENT
Start: 2024-02-14 | End: 2024-02-15

## 2024-02-14 RX ORDER — SIMETHICONE 80 MG/1
80 TABLET, CHEWABLE ORAL EVERY 4 HOURS
Refills: 0 | Status: DISCONTINUED | OUTPATIENT
Start: 2024-02-14 | End: 2024-02-15

## 2024-02-14 RX ORDER — SODIUM CHLORIDE 9 MG/ML
1000 INJECTION, SOLUTION INTRAVENOUS
Refills: 0 | Status: DISCONTINUED | OUTPATIENT
Start: 2024-02-14 | End: 2024-02-14

## 2024-02-14 RX ORDER — CITRIC ACID/SODIUM CITRATE 300-500 MG
15 SOLUTION, ORAL ORAL EVERY 6 HOURS
Refills: 0 | Status: DISCONTINUED | OUTPATIENT
Start: 2024-02-14 | End: 2024-02-14

## 2024-02-14 RX ORDER — DIBUCAINE 1 %
1 OINTMENT (GRAM) RECTAL EVERY 6 HOURS
Refills: 0 | Status: DISCONTINUED | OUTPATIENT
Start: 2024-02-14 | End: 2024-02-15

## 2024-02-14 RX ORDER — KETOROLAC TROMETHAMINE 30 MG/ML
30 SYRINGE (ML) INJECTION ONCE
Refills: 0 | Status: DISCONTINUED | OUTPATIENT
Start: 2024-02-14 | End: 2024-02-14

## 2024-02-14 RX ORDER — HYDROCORTISONE 1 %
1 OINTMENT (GRAM) TOPICAL EVERY 6 HOURS
Refills: 0 | Status: DISCONTINUED | OUTPATIENT
Start: 2024-02-14 | End: 2024-02-15

## 2024-02-14 RX ORDER — OXYTOCIN 10 UNIT/ML
333.33 VIAL (ML) INJECTION
Qty: 20 | Refills: 0 | Status: DISCONTINUED | OUTPATIENT
Start: 2024-02-14 | End: 2024-02-14

## 2024-02-14 RX ORDER — AER TRAVELER 0.5 G/1
1 SOLUTION RECTAL; TOPICAL EVERY 4 HOURS
Refills: 0 | Status: DISCONTINUED | OUTPATIENT
Start: 2024-02-14 | End: 2024-02-15

## 2024-02-14 RX ORDER — TETANUS TOXOID, REDUCED DIPHTHERIA TOXOID AND ACELLULAR PERTUSSIS VACCINE, ADSORBED 5; 2.5; 8; 8; 2.5 [IU]/.5ML; [IU]/.5ML; UG/.5ML; UG/.5ML; UG/.5ML
0.5 SUSPENSION INTRAMUSCULAR ONCE
Refills: 0 | Status: DISCONTINUED | OUTPATIENT
Start: 2024-02-14 | End: 2024-02-15

## 2024-02-14 RX ORDER — IBUPROFEN 200 MG
600 TABLET ORAL EVERY 6 HOURS
Refills: 0 | Status: DISCONTINUED | OUTPATIENT
Start: 2024-02-14 | End: 2024-02-15

## 2024-02-14 RX ORDER — ACETAMINOPHEN 500 MG
975 TABLET ORAL
Refills: 0 | Status: DISCONTINUED | OUTPATIENT
Start: 2024-02-14 | End: 2024-02-15

## 2024-02-14 RX ORDER — DIPHENHYDRAMINE HCL 50 MG
25 CAPSULE ORAL EVERY 4 HOURS
Refills: 0 | Status: DISCONTINUED | OUTPATIENT
Start: 2024-02-14 | End: 2024-02-15

## 2024-02-14 RX ORDER — DIPHENHYDRAMINE HCL 50 MG
25 CAPSULE ORAL EVERY 6 HOURS
Refills: 0 | Status: DISCONTINUED | OUTPATIENT
Start: 2024-02-14 | End: 2024-02-15

## 2024-02-14 RX ORDER — PRAMOXINE HYDROCHLORIDE 150 MG/15G
1 AEROSOL, FOAM RECTAL EVERY 4 HOURS
Refills: 0 | Status: DISCONTINUED | OUTPATIENT
Start: 2024-02-14 | End: 2024-02-15

## 2024-02-14 RX ORDER — OXYTOCIN 10 UNIT/ML
41.67 VIAL (ML) INJECTION
Qty: 20 | Refills: 0 | Status: DISCONTINUED | OUTPATIENT
Start: 2024-02-14 | End: 2024-02-14

## 2024-02-14 RX ORDER — MAGNESIUM HYDROXIDE 400 MG/1
30 TABLET, CHEWABLE ORAL
Refills: 0 | Status: DISCONTINUED | OUTPATIENT
Start: 2024-02-14 | End: 2024-02-15

## 2024-02-14 RX ADMIN — Medication 975 MILLIGRAM(S): at 12:21

## 2024-02-14 RX ADMIN — Medication 125 MILLIUNIT(S)/MIN: at 09:57

## 2024-02-14 RX ADMIN — Medication 600 MILLIGRAM(S): at 18:27

## 2024-02-14 RX ADMIN — OXYCODONE HYDROCHLORIDE 5 MILLIGRAM(S): 5 TABLET ORAL at 23:28

## 2024-02-14 RX ADMIN — Medication 975 MILLIGRAM(S): at 22:40

## 2024-02-14 RX ADMIN — Medication 30 MILLIGRAM(S): at 09:56

## 2024-02-14 RX ADMIN — OXYCODONE HYDROCHLORIDE 5 MILLIGRAM(S): 5 TABLET ORAL at 11:37

## 2024-02-14 RX ADMIN — Medication 1 TABLET(S): at 15:47

## 2024-02-14 RX ADMIN — SODIUM CHLORIDE 3 MILLILITER(S): 9 INJECTION INTRAMUSCULAR; INTRAVENOUS; SUBCUTANEOUS at 17:00

## 2024-02-14 RX ADMIN — Medication 975 MILLIGRAM(S): at 21:45

## 2024-02-14 RX ADMIN — Medication 600 MILLIGRAM(S): at 17:43

## 2024-02-14 RX ADMIN — Medication 600 MILLIGRAM(S): at 23:29

## 2024-02-14 NOTE — OB PROVIDER DELIVERY SUMMARY - NSSELHIDDEN_OBGYN_ALL_OB_FT
[NS_DeliveryAssist1_OBGYN_ALL_OB_FT:[id number]],[NS_DeliveryAttending1_OBGYN_ALL_OB_FT:[id number]=]

## 2024-02-14 NOTE — ED ADULT TRIAGE NOTE - CHIEF COMPLAINT QUOTE
pt states water broke and contractions are q1 minute, 5th pregnancy, ABEBE 2/28, pt checked by MD Whitehead, brought directly to L&D, unable to get BP

## 2024-02-14 NOTE — OB PROVIDER H&P - HISTORY OF PRESENT ILLNESS
43 year old female  at 38 week gestation who presents in active labor     presented from ER with rectal pressure and srom upon arrival    Fully dilated on arrival in triage     [facility]

## 2024-02-14 NOTE — OB RN PATIENT PROFILE - FALL HARM RISK - UNIVERSAL INTERVENTIONS
Bed in lowest position, wheels locked, appropriate side rails in place/Call bell, personal items and telephone in reach/Instruct patient to call for assistance before getting out of bed or chair/Non-slip footwear when patient is out of bed/[place] to call system/Physically safe environment - no spills, clutter or unnecessary equipment/Purposeful Proactive Rounding/Room/bathroom lighting operational, light cord in reach

## 2024-02-14 NOTE — OB PROVIDER DELIVERY SUMMARY - NSPROVIDERDELIVERYNOTE_OBGYN_ALL_OB_FT
patient presented from ER in active labor  fully dilated on arrival    Viable male fetus delivered over intact perineum  spontaneous cry   mouth and nose bulb suctioned and  placed on maternal abdomen   Cord clamped and cut Cord gases sent     placenta delivered spontaneously  intact  Uterus firm        intact perineum  no laceration or tears     Apgar 8 9    EFW   EBL  [id number]

## 2024-02-14 NOTE — OB RN DELIVERY SUMMARY - NS_SEPSISRSKCALC_OBGYN_ALL_OB_FT
No temperature has been documented for this patient in CPN or on the OB Flowsheet. Ensure the highest temperature during labor was documented on the OB Flowsheet.   EOS calculated successfully. EOS Risk Factor: 0.5/1000 live births ([facility] national incidence); GA=37w6d; Temp=98.24; ROM=0; GBS='Negative'; Antibiotics='No antibiotics or any antibiotics < 2 hrs prior to birth'

## 2024-02-14 NOTE — OB PROVIDER DELIVERY SUMMARY - NSINTDELPPHRISK_OBGYN_A_OB_CAL
Referring physician:Ginette Norris, *    Reason for referral  AMA age at delivery 40 years  Obesity BMI 47.77  Question of hypertension    HISTORY OF PRESENT ILLNESS:   Jamila Donaldson is a 39 year old  G 4 P  female seen at 13w4d  gestation for the issues noted above.    Her blood pressure today was mildly elevated at 158/84. She stated that she is being observed by her physician for hypertension.     CURRENT MEDICATIONS:  Current Outpatient Medications   Medication Sig Dispense Refill   • aspirin 81 MG tablet Take 1 tablet by mouth daily.     • progesterone 200 mg vaginal suppository Unwrap and insert 1 suppository vaginally at bedtime. 30 g 2     No current facility-administered medications for this visit.        PAST OB HISTORY:   OB History    Para Term  AB Living   4 2 0 0 1 0   SAB TAB Ectopic Molar Multiple Live Births   1 0 0 0 0 0       Allergies as of 2020   • (No Known Allergies)       PAST MEDICAL HISTORY:     GERD (gastroesophageal reflux disease)                        Depression                                                    Obesity                                                       Tobacco dependence                                            LUQ pain                                                      History of bloody stools                        2016          Comment: loose     PAST SURGICAL HISTORY:      SECTION, CLASSIC                       ,       Comment: x2     FAMILY HISTORY:  Family History   Problem Relation Age of Onset   • Hypertension Mother    • Diabetes Mother    • Other Brother         spina bifida   • Learning disabilities Brother    • Mental retardation Brother    • Kidney disease Brother    • Cancer Father         stomach   • NEGATIVE FAMILY HX OF Daughter    • NEGATIVE FAMILY HX OF Daughter        SOCIAL HISTORY:   Social History     Tobacco Use   • Smoking status: Former Smoker     Packs/day: 0.25     Years: 4.00      Pack years: 1.00     Types: Cigarettes     Last attempt to quit: 2019     Years since quittin.1   • Smokeless tobacco: Never Used   • Tobacco comment: Patient will try to quit on her own.   Substance Use Topics   • Alcohol use: No     Alcohol/week: 0.0 standard drinks   • Drug use: No       VITAL SIGNS:    Visit Vitals  LMP 10/02/2019 (Exact Date)       LABS  Lab Services on 2019   Component Date Value   • HEP B Surface AG 2019 NEGATIVE    • WBC 2019 10.0    • RBC 2019 4.59    • HGB 2019 13.6    • HCT 2019 39.4    • MCV 2019 85.8    • MCH 2019 29.6    • MCHC 2019 34.5    • RDW-CV 2019 13.0    • PLT 2019 240    • NRBC 2019 0    • DIFF TYPE 2019 AUTOMATED DIFFERENTIAL    • Neutrophil 2019 69    • LYMPH 2019 20    • MONO 2019 8    • EOSIN 2019 2    • BASO 2019 0    • Percent Immature Granulo* 2019 1    • Absolute Neutrophil 2019 6.9    • Absolute Lymph 2019 2.0    • Absolute Mono 2019 0.8    • Absolute Eos 2019 0.2    • Absolute Baso 2019 0.0    • Absolute Immature Granul* 2019 0.1    • Treponema pallidum IgG/I* 2019 NONREACTIVE    • Rubella Antibody IgG 2019 37.6    • ABO/RH(D) 2019 B POSITIVE    • ANTIBODY SCREEN 2019 NEGATIVE    • HIV Antigen/Antibody Scr* 2019 NONREACTIVE    First OB Visit on 2019   Component Date Value   • COLOR 2019 YELLOW    • APPEARANCE 2019 CLEAR    • GLUCOSE(URINE) 2019 NEGATIVE    • BILIRUBIN 2019 NEGATIVE    • KETONES 2019 NEGATIVE    • SPECIFIC GRAVITY 2019 1.025    • BLOOD 2019 NEGATIVE    • pH 2019 5.0    • PROTEIN(URINE) 2019 NEGATIVE    • UROBILINOGEN 2019 0.2    • NITRITE 2019 NEGATIVE    • LEUKOCYTE ESTERASE 2019 NEGATIVE    • SPECIMEN TYPE 2019 URINE, CLEAN CATCH/MIDSTREAM    • PATH REPORT, PAPHPV 2019                       Value:Name: CELESTE YODER               MRN:     [id number]    :  1980                     Visit#:  [id number]                            Cytology Report        Client: [facility]                      Submitting Physician:Ginette Gardiner        Date Specimen Collected: 19            Accession #:  [id number]    Date Specimen Received:  19            Requisition #:[id number]    Date Reported:           2019 16:40     [facility] [place]                                * Addendum Present *    ______________________________________________________________________________    Cytologic Interpretation :        NEGATIVE FOR INTRAEPITHELIAL LESION OR MALIGNANCY.          Satisfactory for evaluation.  Presence of endocervical/transformation zone    component.    Primary screening of this case was performed at [facility], [address] [place] [address].            Carlos Head, SCT(ASCP)        ** Electro                          cathy Signature (SJB) 2019 16:40 **        Educational note:  The Pap test is a screening test with a well-recognized    false negative rate.  The best means available to lower the false negative    rate and to detect early cervical lesions is a Pap test at regular intervals.     All ThinPrep Paps will be reviewed with the aid of the ThinPrep Imaging    System, unless otherwise specified.        ______________________________________________________________________________    Clinical Information:    LMP: [id number]    Menstrual Hx:  Pregnant    REASON FOR COLLECTION::LOW RISK - ENCOUNTER FOR SCREENING FOR MALIGNANT    NEOPLASM OF CERVIX Z12.4    SOURCE::CERVICAL        Specimen(s) Submitted:     PAP with High Risk HPV        Procedures/Addenda:    HPV, High Risk_WI:    Date Ordered:     2019     Date Reported:     2019         Interpretation                                    1        Aptima High Risk HPV Assay Result:  NEGATIVE        Results-Comments        The APTIMA HPV                           Assay is an in vitro nucleic acid amplification test for the    qualitative detection of E6/E7 viral messenger RNA (mRNA) from 14 high-risk    types of human papillomavirus (HPV) in cervical specimens. The high-risk HPV    types detected by the assay include: 16, 18, 31, 33, 35, 39, 45, 51, 52, 56,    58, 59, 66, and 68. The Aptima HPV Assay does not discriminate between the 14    high-risk types. Cervical specimens in the Thin Prep Pap Test vials containing    PreservCyt Solution and collected with broom-type or cytobrush/spatula    collection devices are FDA approved for this assay using the [employer] System.         The Aptima High Risk HPV assay is not FDA approved for use as a stand-alone    test for primary HPV screening.  It is approved for use only in conjunction    with cytology. The Aptima High Risk HPV assay is not intended for use as a    screening device for women under 30 with normal cervical cytology or    replacement of regular cervical cytology screening.         The Ap                          giselle High Risk HPV assay is not FDA approved for testing on vaginal    and/or rectal specimens. [employer] has internally validated these    specimen sources. The expected normal reference range is negative.        Tech Code:  3303         [facility] Cytology Department    ** Electronic Signature (AJD) 12/17/2019 11:47 **          ICD Codes:     Z23 Z34.91 Z12.4 Z11.3        Fee Codes:     A: [id number]     HPV_WI: [id number]        Performing Lab Location (Unless otherwise specified):    [address]  [address]       • Thin Prep Source 12/16/2019 CERVICOVAGINAL    • Chlamydia Trachomatis by* 12/16/2019 NEGATIVE    • Neisseria Gonorrhoeae by* 12/16/2019 NEGATIVE          ULTRASOUND  Please see full report under the imaging tab. Fetal measurement  consistent with early ultrasound dating. NT normal. A nasal bone could not be seen secondary to fetal position.    ASSESSMENT AND PLAN:   The patient was seen in referral today at 13w4d for:    Advanced maternal age. The patient was counseled for her age risk of 40. She has a risk for fetal aneuploidy. She has been seen by genetics and has opted for cell-free DNA testing. She also has a risk of gestational diabetes. She will be screened for gestational diabetes later in the pregnancy. She also has a risk of pre-eclampsia. She will be watched for pre-eclampsia during the entire pregnancy and the postpartum period. To reduce this risk she should begin to take a baby aspirin daily. She also has a risk of intrauterine fetal demise. To reduce this risk daily maternal kick counts should begin at 24 weeks gestation. Since IUFD's are associated with poor fetal growth, a scan for fetal growth is recommended at 32 weeks gestation.    Obesity The patient was counseled regarding weight gain in an overweight woman. The recommended weight gain for the entire pregnancy is 15 lbs.    Question of hypertension. BP today was 158/84. She was counseled regarding the need to keep blood pressures within the normal range during pregnancy. Although mild hypertension is not usually treated during pregnancy, her blood pressure today indicate she may need to begin an antihypertensive medication. If needed labetalol would be my first choice. Fetal growth would then be monitored at four week intervals. It would be wise to obtain baseline labs since about a third of chronic hypertensives develop pre-eclampsia during pregnancy. This is another reason to begin a baby aspirin daily.    Her anatomy scan should be scheduled for approximately 7 - 8 weeks.    I spent approximately 25 minutes in face to face counseling with this patient.    If you have any concerns or questions, please feel free to contact the [facility] office. Thank you for allowing me to  participate in your patient's care.

## 2024-02-14 NOTE — OB RN PATIENT PROFILE - SURGICAL SITE INCISION
I have reviewed the patient's pertinent history.  I have discussed this case with the resident and agree with the assessment and plan.       no

## 2024-02-14 NOTE — OB RN PATIENT PROFILE - PARENTS VERBALIZED UNDERSTANDING OF THE SAFE SKIN TO SKIN POSITIONING OF THE NEWBORN.
08/17/21 1530   Provider Notification   Reason for Communication Evaluate;New orders   Provider Name Dr Karla Velarde   Provider Notification Physician   Method of Communication Call   Response Waiting for response   However patient potassium was 5.5 today. Cariology had discontinued Lisonapril anticipating that was the cause but potassium remains high. No cardiac arrythmias noted. No potassium supplements have been given. Statement Selected

## 2024-02-14 NOTE — OB PROVIDER H&P - ASSESSMENT
43 year ol d female  at 38 wk active labor    precipitous  delivery in triage      Dr Rayomnd in attendance

## 2024-02-14 NOTE — OB PROVIDER TRIAGE NOTE - HISTORY OF PRESENT ILLNESS
43 year old female  at 38 week gestation who presents in active labor     presented from ER with rectal pressure and srom upon arrival    Fully dilated on arrival in triage     [facility]

## 2024-02-14 NOTE — OB PROVIDER DELIVERY SUMMARY - NSINTDELPPHRISK_OBGYN_A_OB
[FreeTextEntry1] : Impression: History of spontaneous left nostril CSF leak, s/p endoscopic endonasal repair of CSF with skull base reconstruction, lumbar drain 6/29/2022, after procedure, increased ICP, s/p VPS placement by Dr. MARLOW 7/7/2022\par \par \par Plan: FU in 3 months\par Patient knows to call the office if there are any new or worsening symptoms. \par All questions and concerns answered and addressed in detail to patient's complete satisfaction. Patient verbalized understanding and agreed to plan.\par  Precipitous delivery

## 2024-02-14 NOTE — OB PROVIDER TRIAGE NOTE - NSOBPROVIDERNOTE_OBGYN_ALL_OB_FT
43 year ol d female  at 38 wk active labor    precipitous  delivery in triage      Dr Raymond in attendance

## 2024-02-14 NOTE — OB RN DELIVERY SUMMARY - NSSELHIDDEN_OBGYN_ALL_OB_FT
[NS_DeliveryAssist1_OBGYN_ALL_OB_FT:[id number]],[NS_DeliveryAttending1_OBGYN_ALL_OB_FT:[id number]],[NS_DeliveryRN_OBGYN_ALL_OB_FT:[id number]]

## 2024-02-15 ENCOUNTER — TRANSCRIPTION ENCOUNTER (OUTPATIENT)
Age: 44
End: 2024-02-15

## 2024-02-15 VITALS
DIASTOLIC BLOOD PRESSURE: 80 MMHG | HEART RATE: 92 BPM | OXYGEN SATURATION: 100 % | RESPIRATION RATE: 17 BRPM | SYSTOLIC BLOOD PRESSURE: 117 MMHG | TEMPERATURE: 99 F

## 2024-02-15 DIAGNOSIS — H10.9 UNSPECIFIED CONJUNCTIVITIS: ICD-10-CM

## 2024-02-15 LAB
BASOPHILS # BLD AUTO: 0.02 K/UL — SIGNIFICANT CHANGE UP (ref 0–0.2)
BASOPHILS NFR BLD AUTO: 0.1 % — SIGNIFICANT CHANGE UP (ref 0–2)
EOSINOPHIL # BLD AUTO: 0.12 K/UL — SIGNIFICANT CHANGE UP (ref 0–0.5)
EOSINOPHIL NFR BLD AUTO: 0.8 % — SIGNIFICANT CHANGE UP (ref 0–6)
HCT VFR BLD CALC: 31.3 % — LOW (ref 34.5–45)
HGB BLD-MCNC: 10.2 G/DL — LOW (ref 11.5–15.5)
IANC: 9.82 K/UL — HIGH (ref 1.8–7.4)
IMM GRANULOCYTES NFR BLD AUTO: 0.6 % — SIGNIFICANT CHANGE UP (ref 0–0.9)
LYMPHOCYTES # BLD AUTO: 26.2 % — SIGNIFICANT CHANGE UP (ref 13–44)
LYMPHOCYTES # BLD AUTO: 3.79 K/UL — HIGH (ref 1–3.3)
MCHC RBC-ENTMCNC: 29 PG — SIGNIFICANT CHANGE UP (ref 27–34)
MCHC RBC-ENTMCNC: 32.6 GM/DL — SIGNIFICANT CHANGE UP (ref 32–36)
MCV RBC AUTO: 88.9 FL — SIGNIFICANT CHANGE UP (ref 80–100)
MONOCYTES # BLD AUTO: 0.65 K/UL — SIGNIFICANT CHANGE UP (ref 0–0.9)
MONOCYTES NFR BLD AUTO: 4.5 % — SIGNIFICANT CHANGE UP (ref 2–14)
NEUTROPHILS # BLD AUTO: 9.82 K/UL — HIGH (ref 1.8–7.4)
NEUTROPHILS NFR BLD AUTO: 67.8 % — SIGNIFICANT CHANGE UP (ref 43–77)
NRBC # BLD: 0 /100 WBCS — SIGNIFICANT CHANGE UP (ref 0–0)
NRBC # FLD: 0 K/UL — SIGNIFICANT CHANGE UP (ref 0–0)
PLATELET # BLD AUTO: 269 K/UL — SIGNIFICANT CHANGE UP (ref 150–400)
RBC # BLD: 3.52 M/UL — LOW (ref 3.8–5.2)
RBC # FLD: 16.3 % — HIGH (ref 10.3–14.5)
WBC # BLD: 14.49 K/UL — HIGH (ref 3.8–10.5)
WBC # FLD AUTO: 14.49 K/UL — HIGH (ref 3.8–10.5)

## 2024-02-15 RX ORDER — POLYMYXIN B SULF/TRIMETHOPRIM 10000-1/ML
1 DROPS OPHTHALMIC (EYE)
Qty: 1 | Refills: 0
Start: 2024-02-15 | End: 2024-02-24

## 2024-02-15 RX ORDER — IBUPROFEN 200 MG
1 TABLET ORAL
Qty: 0 | Refills: 0 | DISCHARGE
Start: 2024-02-15

## 2024-02-15 RX ORDER — POLYMYXIN B SULF/TRIMETHOPRIM 10000-1/ML
1 DROPS OPHTHALMIC (EYE)
Refills: 0 | Status: DISCONTINUED | OUTPATIENT
Start: 2024-02-15 | End: 2024-02-15

## 2024-02-15 RX ORDER — ERYTHROMYCIN BASE 5 MG/GRAM
1 OINTMENT (GRAM) OPHTHALMIC (EYE)
Refills: 0 | Status: DISCONTINUED | OUTPATIENT
Start: 2024-02-15 | End: 2024-02-15

## 2024-02-15 RX ORDER — ACETAMINOPHEN 500 MG
3 TABLET ORAL
Qty: 0 | Refills: 0 | DISCHARGE
Start: 2024-02-15

## 2024-02-15 RX ORDER — MEDROXYPROGESTERONE ACETATE 150 MG/ML
150 INJECTION, SUSPENSION, EXTENDED RELEASE INTRAMUSCULAR ONCE
Refills: 0 | Status: COMPLETED | OUTPATIENT
Start: 2024-02-15 | End: 2024-02-15

## 2024-02-15 RX ADMIN — SODIUM CHLORIDE 3 MILLILITER(S): 9 INJECTION INTRAMUSCULAR; INTRAVENOUS; SUBCUTANEOUS at 06:00

## 2024-02-15 RX ADMIN — Medication 975 MILLIGRAM(S): at 08:48

## 2024-02-15 RX ADMIN — Medication 600 MILLIGRAM(S): at 17:55

## 2024-02-15 RX ADMIN — Medication 600 MILLIGRAM(S): at 01:27

## 2024-02-15 RX ADMIN — Medication 975 MILLIGRAM(S): at 04:00

## 2024-02-15 RX ADMIN — Medication 600 MILLIGRAM(S): at 18:31

## 2024-02-15 RX ADMIN — Medication 975 MILLIGRAM(S): at 02:59

## 2024-02-15 RX ADMIN — Medication 1 DROP(S): at 17:56

## 2024-02-15 RX ADMIN — Medication 975 MILLIGRAM(S): at 15:03

## 2024-02-15 RX ADMIN — Medication 600 MILLIGRAM(S): at 12:40

## 2024-02-15 RX ADMIN — Medication 600 MILLIGRAM(S): at 11:45

## 2024-02-15 RX ADMIN — Medication 975 MILLIGRAM(S): at 16:00

## 2024-02-15 RX ADMIN — Medication 1 APPLICATION(S): at 02:59

## 2024-02-15 RX ADMIN — Medication 100 MILLIGRAM(S): at 02:56

## 2024-02-15 RX ADMIN — Medication 1 DROP(S): at 11:46

## 2024-02-15 RX ADMIN — Medication 1 TABLET(S): at 11:45

## 2024-02-15 RX ADMIN — SODIUM CHLORIDE 3 MILLILITER(S): 9 INJECTION INTRAMUSCULAR; INTRAVENOUS; SUBCUTANEOUS at 01:28

## 2024-02-15 RX ADMIN — Medication 975 MILLIGRAM(S): at 09:45

## 2024-02-15 RX ADMIN — MEDROXYPROGESTERONE ACETATE 150 MILLIGRAM(S): 150 INJECTION, SUSPENSION, EXTENDED RELEASE INTRAMUSCULAR at 15:56

## 2024-02-15 NOTE — DISCHARGE NOTE OB - MEDICATION SUMMARY - MEDICATIONS TO TAKE
I will START or STAY ON the medications listed below when I get home from the hospital:    ibuprofen 600 mg oral tablet  -- 1 tab(s) by mouth every 6 hours  -- Indication: For Normal vaginal delivery    acetaminophen 325 mg oral tablet  -- 3 tab(s) by mouth every 6 hours  -- Indication: For Normal vaginal delivery    Prenatal Multivitamins with Folic Acid 1 mg oral tablet  -- 1 tab(s) by mouth once a day  -- Indication: For Normal vaginal delivery    polymyxin B-trimethoprim 10,000 units-1 mg/mL ophthalmic solution  -- 1 drop(s) in each affected eye 4 times a day  -- Indication: For Bacterial conjunctivitis    Hair, Skin, and Nails Gummies oral tablet, chewable  -- 1 gum by mouth once a day  -- Indication: For health maintainance

## 2024-02-15 NOTE — CHART NOTE - NSCHARTNOTEFT_GEN_A_CORE
RN called for pt complaining of right eye itching and drainage.     Seen and examined at bedside. Patient reports pruritic, red right eye for 1 day with new onset crustiness and drainage over the evening. Patient reports she has a child at home who has been diagnosed with bacterial conjunctivitis.     Right eye appears erythematous and white drainage from corner of eye. Left eye wnl.     A&P:  Bacterial conjunctivitis  - gave patient contact precautions regarding washing hands thoroughly after touching her eye  - Start erythromycin ophthalmic ointment, 4x daily for 5-7 days and monitor symptoms    Olive Garza, PGY1

## 2024-02-15 NOTE — DISCHARGE NOTE OB - PROVIDER TOKENS
FREE:[LAST:[[address] [facility] baron],PHONE:[phone]],FAX:[(   )    -],ADDRESS:[[address] [place] [address]],FOLLOWUP:[1 month]]

## 2024-02-15 NOTE — PROGRESS NOTE ADULT - ATTENDING COMMENTS
Associate Chief of L & D (Late entry)    I have met this patient for the first time today. She was admitted and delivered by DR Raymond   OB Progress Note:  PPD#1    S: 44yo  PPD#1 s/p . Patient feels well. Pain is well controlled. She is tolerating a regular diet and passing flatus. She is voiding spontaneously, and ambulating without difficulty. Denies CP/SOB. Denies lightheadedness/dizziness. Denies N/V.    O:  Vitals:  Vital Signs Last 24 Hrs  T(C): 36.9 (15 Feb 2024 06:00), Max: 37.1 (2024 16:32)  T(F): 98.4 (15 Feb 2024 06:00), Max: 98.7 (2024 16:32)  HR: 90 (15 Feb 2024 06:00) (82 - 109)  BP: 112/64 (15 Feb 2024 06:00) (100/55 - 118/68)  RR: 16 (15 Feb 2024 06:00) (16 - 18)  SpO2: 100% (15 Feb 2024 06:00) (98% - 100%)    Parameters below as of 2024 22:00  Patient On (Oxygen Delivery Method): room air        MEDICATIONS  (STANDING):  acetaminophen     Tablet .. 975 milliGRAM(s) Oral <User Schedule>  diphtheria/tetanus/pertussis (acellular) Vaccine (Adacel) 0.5 milliLiter(s) IntraMuscular once  ibuprofen  Tablet. 600 milliGRAM(s) Oral every 6 hours  medroxyPROGESTERone depot Injectable 150 milliGRAM(s) IntraMuscular once  prenatal multivitamin 1 Tablet(s) Oral daily  sodium chloride 0.9% lock flush 3 milliLiter(s) IV Push every 8 hours  trimethoprim/polymyxin Solution 1 Drop(s) Right EYE four times a day      Labs:  Blood type: B Positive  Rubella IgG: RPR: Negative                          10.2<L>   14.49<H> >-----------< 269    ( 02-15 @ 07:46 )             31.3<L>                        11.7   16.49<H> >-----------< 310    (  @ 08:45 )             35.3      Physical Exam:  Abdomen: soft, non-tender, non-distended, fundus firm  Vaginal: Lochia scant   Extremities: No erythema/trace edema    A/P: 44yo PPD#1 s/p precipitous   and right eye conjunctivitis     - Patient is stable for discharge and will follow up in the [facility] for OB care and with an outside MD for her conjunctivitis     Shabana Jorge M.D., M.B.A., M.S.

## 2024-02-15 NOTE — DISCHARGE NOTE OB - CARE PLAN
Principal Discharge DX:	Normal vaginal delivery  Assessment and plan of treatment:	uncomplicated   anticipate full recovery  Secondary Diagnosis:	Bacterial conjunctivitis  Assessment and plan of treatment:	right eye itching and yellowing crusting   Rx Polytrim eye drops, 1 drop 4x daily for 7-10 days   1 Principal Discharge DX:	Normal vaginal delivery  Assessment and plan of treatment:	uncomplicated   anticipate full recovery  Secondary Diagnosis:	Bacterial conjunctivitis  Assessment and plan of treatment:	Right eye itching and yellowing crusting   Rx Polytrim eye drops, 1 drop 4x daily for 7-10 days  Please wash hands thoroughly after touching affected eye

## 2024-02-15 NOTE — PROGRESS NOTE ADULT - PROBLEM SELECTOR PLAN 1
QBL: 350ml  HCT: 35.3->  F/u AM CBC  - Continue with po analgesia  - Increase ambulation  - Continue regular diet - right eye pruritis and discharge   - On Polytrim opthalmic drop (1 drop in affected eye, 4x daily)  to be continued for 7-10 days   - s/p erythromycin ophthalmic ointment, (2/15)  - reassess PRN    Priyanka Rose PGY1

## 2024-02-15 NOTE — PROGRESS NOTE ADULT - ASSESSMENT
42yo  PPD#1 s/p . Vitals stable overnight. Pt complained of right eye itching associated with whitish discharge and crusting overnight, reported having a child at home with bacteria conjunctivitis. This morning Pt reports  44yo  PPD#1 s/p . Vitals stable overnight. Pt complained of right eye itching associated with whitish discharge, crusting and blurry vision overnight, erythromycin ointment started. This morning pt reports vision improved, however with continued itchy, tearing and yellowish crusting of the eye.

## 2024-02-15 NOTE — DISCHARGE NOTE OB - PLAN OF CARE
uncomplicated   anticipate full recovery right eye itching and yellowing crusting   Rx Polytrim eye drops, 1 drop 4x daily for 7-10 days Right eye itching and yellowing crusting   Rx Polytrim eye drops, 1 drop 4x daily for 7-10 days  Please wash hands thoroughly after touching affected eye

## 2024-02-15 NOTE — DISCHARGE NOTE OB - PATIENT PORTAL LINK FT
You can access the FollowMyHealth Patient Portal offered by [facility] by registering at the following website: [email or web address]. By joining [employer]’s FollowMyHealth portal, you will also be able to view your health information using other applications (apps) compatible with our system.

## 2024-02-15 NOTE — PROGRESS NOTE ADULT - PROBLEM SELECTOR PLAN 2
- right eye pruritis and discharge   - started on erythromycin ophthalmic ointment, apply 4x daily (2/15-)  to be continued for 5-7 days  - reassess PRN    Priyanka Rose PGY1 - right eye pruritis and discharge   - On Polytrim opthalmic drop (1 drop in affected eye, 4x daily)  to be continued for 7-10 days   - s/p erythromycin ophthalmic ointment, (2/15)  - reassess PRN    Priyanka Rose PGY1 QBL: 350ml  HCT: 35.3->31.3  - Continue with po analgesia  - Increase ambulation  - Continue regular diet  - pt desiring depo provera for birth control   - Pt wants to be discharged today   - d/c planning    Priyanka Rose PGY1

## 2024-02-15 NOTE — DISCHARGE NOTE OB - HOSPITAL COURSE
Patient was admitted to L+D in active labor. Pt had an uncomplicated  followed by an uncomplicated postpartum course.  EBL: 350ml  Hct:  35.3->31.3  On Postpartum day 1, patient was discharged home in stable condition, voiding spontaneously, pain well controlled, ambulating, tolerating PO and with normal vital signs. Pt plans to follow up in the [facility] in 6 weeks. Telephone number and clinic information provided prior to discharge.  Patient was admitted to L+D in active labor. Pt had an uncomplicated . Postpartum course complicated by right eye itchiness and discharge consistent with bacteria conjuctivitis   EBL: 350ml  Hct:  35.3->31.3  On Postpartum day 1, patient was discharged home in stable condition, voiding spontaneously, pain well controlled, ambulating, tolerating PO and with normal vital signs. Pt plans to follow up in the [facility] in 6 weeks. Telephone number and clinic information provided prior to discharge.

## 2024-02-15 NOTE — DISCHARGE NOTE OB - MATERIALS PROVIDED
[facility] [place] Screening [facility]/[place]  Immunization Record/Guide to Postpartum Care/Shaken Baby Prevention Handout/Discharge Medication Information for Patients and Families Pocket Guide

## 2024-02-15 NOTE — DISCHARGE NOTE OB - CARE PROVIDER_API CALL
[place] [facility],   [address],   [place] [address]  Phone: [phone]  Fax: (   )    -  Follow Up Time: 1 month

## 2024-02-15 NOTE — PROGRESS NOTE ADULT - SUBJECTIVE AND OBJECTIVE BOX
42yo  PPD#1 s/p   Patient seen and examined at bedside. No acute complaints, pain well controlled. Patient is ambulating, voiding spontaneously, passing gas, and tolerating regular diet. Denies CP, SOB, N/V, HA, blurred vision, epigastric pain.    Vital Signs Last 24 Hours  T(C): 37 (02-15-24 @ 02:00), Max: 37.1 (24 @ 16:32)  HR: 86 (02-15-24 @ 02:00) (71 - 113)  BP: 113/65 (02-15-24 @ 02:00) (100/55 - 132/78)  RR: 17 (02-15-24 @ 02:00) (17 - 18)  SpO2: 100% (02-15-24 @ 02:00) (96% - 100%)    Physical Exam:  General: NAD  Abdomen: Soft, non-tender, non-distended, fundus firm  Pelvic: Lochia wnl    Labs:    Blood Type: B Positive  Antibody Screen: --  RPR: Negative               11.7   16.49 )-----------( 310      ( 14 @ 08:45 )             35.3         MEDICATIONS  (STANDING):  acetaminophen     Tablet .. 975 milliGRAM(s) Oral <User Schedule>  diphtheria/tetanus/pertussis (acellular) Vaccine (Adacel) 0.5 milliLiter(s) IntraMuscular once  erythromycin   Ointment 1 Application(s) Right EYE four times a day  ibuprofen  Tablet. 600 milliGRAM(s) Oral every 6 hours  prenatal multivitamin 1 Tablet(s) Oral daily  sodium chloride 0.9% lock flush 3 milliLiter(s) IV Push every 8 hours    MEDICATIONS  (PRN):  artificial  tears Solution 1 Drop(s) Both EYES four times a day PRN Dry Eyes  benzocaine 20%/menthol 0.5% Spray 1 Spray(s) Topical every 6 hours PRN for Perineal discomfort  dibucaine 1% Ointment 1 Application(s) Topical every 6 hours PRN Perineal discomfort  diphenhydrAMINE 25 milliGRAM(s) Oral every 4 hours PRN Rash and/or Itching  diphenhydrAMINE 25 milliGRAM(s) Oral every 6 hours PRN Pruritus  guaiFENesin Oral Liquid (Sugar-Free) 100 milliGRAM(s) Oral every 6 hours PRN Cough  hydrocortisone 1% Cream 1 Application(s) Topical every 6 hours PRN Moderate Pain (4-6)  lanolin Ointment 1 Application(s) Topical every 6 hours PRN nipple soreness  magnesium hydroxide Suspension 30 milliLiter(s) Oral two times a day PRN Constipation  oxyCODONE    IR 5 milliGRAM(s) Oral every 3 hours PRN Moderate to Severe Pain (4-10)  oxyCODONE    IR 5 milliGRAM(s) Oral once PRN Moderate to Severe Pain (4-10)  pramoxine 1%/zinc 5% Cream 1 Application(s) Topical every 4 hours PRN Moderate Pain (4-6)  simethicone 80 milliGRAM(s) Chew every 4 hours PRN Gas  witch hazel Pads 1 Application(s) Topical every 4 hours PRN Perineal discomfort       42yo  PPD#1 s/p   Patient seen and examined at bedside. Pain well controlled. Patient is ambulating, voiding spontaneously, passing gas, and tolerating regular diet. Denies CP, SOB, N/V, HA, blurred vision, epigastric pain.    Vital Signs Last 24 Hours  T(C): 37 (02-15-24 @ 02:00), Max: 37.1 (24 @ 16:32)  HR: 86 (02-15-24 @ 02:00) (71 - 113)  BP: 113/65 (02-15-24 @ 02:00) (100/55 - 132/78)  RR: 17 (02-15-24 @ 02:00) (17 - 18)  SpO2: 100% (02-15-24 @ 02:00) (96% - 100%)    Physical Exam:  General: NAD  Abdomen: Soft, non-tender, non-distended, fundus firm  Pelvic: Lochia wnl    Labs:    Blood Type: B Positive  Antibody Screen: --  RPR: Negative               11.7   16.49 )-----------( 310      ( 14 @ 08:45 )             35.3         MEDICATIONS  (STANDING):  acetaminophen     Tablet .. 975 milliGRAM(s) Oral <User Schedule>  diphtheria/tetanus/pertussis (acellular) Vaccine (Adacel) 0.5 milliLiter(s) IntraMuscular once  erythromycin   Ointment 1 Application(s) Right EYE four times a day  ibuprofen  Tablet. 600 milliGRAM(s) Oral every 6 hours  prenatal multivitamin 1 Tablet(s) Oral daily  sodium chloride 0.9% lock flush 3 milliLiter(s) IV Push every 8 hours    MEDICATIONS  (PRN):  artificial  tears Solution 1 Drop(s) Both EYES four times a day PRN Dry Eyes  benzocaine 20%/menthol 0.5% Spray 1 Spray(s) Topical every 6 hours PRN for Perineal discomfort  dibucaine 1% Ointment 1 Application(s) Topical every 6 hours PRN Perineal discomfort  diphenhydrAMINE 25 milliGRAM(s) Oral every 4 hours PRN Rash and/or Itching  diphenhydrAMINE 25 milliGRAM(s) Oral every 6 hours PRN Pruritus  guaiFENesin Oral Liquid (Sugar-Free) 100 milliGRAM(s) Oral every 6 hours PRN Cough  hydrocortisone 1% Cream 1 Application(s) Topical every 6 hours PRN Moderate Pain (4-6)  lanolin Ointment 1 Application(s) Topical every 6 hours PRN nipple soreness  magnesium hydroxide Suspension 30 milliLiter(s) Oral two times a day PRN Constipation  oxyCODONE    IR 5 milliGRAM(s) Oral every 3 hours PRN Moderate to Severe Pain (4-10)  oxyCODONE    IR 5 milliGRAM(s) Oral once PRN Moderate to Severe Pain (4-10)  pramoxine 1%/zinc 5% Cream 1 Application(s) Topical every 4 hours PRN Moderate Pain (4-6)  simethicone 80 milliGRAM(s) Chew every 4 hours PRN Gas  witch hazel Pads 1 Application(s) Topical every 4 hours PRN Perineal discomfort

## 2024-02-16 ENCOUNTER — APPOINTMENT (OUTPATIENT)
Dept: ANTEPARTUM | Facility: CLINIC | Age: 44
End: 2024-02-16

## 2024-02-16 ENCOUNTER — APPOINTMENT (OUTPATIENT)
Dept: MATERNAL FETAL MEDICINE | Facility: CLINIC | Age: 44
End: 2024-02-16

## 2024-02-22 ENCOUNTER — NON-APPOINTMENT (OUTPATIENT)
Age: 44
End: 2024-02-22

## 2024-03-01 ENCOUNTER — APPOINTMENT (OUTPATIENT)
Age: 44
End: 2024-03-01

## 2024-03-15 ENCOUNTER — APPOINTMENT (OUTPATIENT)
Dept: OBGYN | Facility: CLINIC | Age: 44
End: 2024-03-15

## 2024-03-28 NOTE — ED PROVIDER NOTE - MDM PATIENT STATEMENT FOR ADDL TREATMENT
Last office visit date: 6/12/2023   Medication Refill Protocol Failed. Failed criteria: SEE BELOW. Sent to clinician to review. Cholesterol (mg/dL)   Date Value   05/17/2022 184     HDL (mg/dL)   Date Value   05/17/2022 51     Cholesterol/ HDL Ratio (no units)   Date Value   05/17/2022 3.6     Triglycerides (mg/dL)   Date Value   05/17/2022 152 (H)     LDL (mg/dL)   Date Value   05/17/2022 103       Name from pharmacy: METFORMIN 500MG TABLETS         Will file in chart as: metFORMIN (GLUCOPHAGE) 500 MG tablet    Sig: Take 1 tablet by mouth nightly. Overdue for lipid panel, please call to setup.     Original sig: TAKE 1 TABLET BY MOUTH EVERY NIGHT    Disp: 90 tablet    Refills: 0 (Pharmacy requested: Not specified)    Start: 3/28/2024    Class: Eprescribe    For: Type 2 diabetes mellitus with hyperglycemia, without long-term current use of insulin (CMD)    Last ordered: 3 months ago (12/18/2023) by Williams Guallpa MD    Last refill: 12/18/2023    Rx #: [id number]    Mary Trinh.
Patient with one or more new problems requiring additional work-up/treatment.

## 2024-05-02 ENCOUNTER — OUTPATIENT (OUTPATIENT)
Dept: OUTPATIENT SERVICES | Facility: HOSPITAL | Age: 44
LOS: 1 days | End: 2024-05-02
Payer: MEDICAID

## 2024-05-02 ENCOUNTER — APPOINTMENT (OUTPATIENT)
Dept: OBGYN | Facility: CLINIC | Age: 44
End: 2024-05-02
Payer: MEDICAID

## 2024-05-02 VITALS
TEMPERATURE: 97.9 F | OXYGEN SATURATION: 99 % | BODY MASS INDEX: 32.09 KG/M2 | HEIGHT: 63 IN | SYSTOLIC BLOOD PRESSURE: 118 MMHG | HEART RATE: 83 BPM | WEIGHT: 181.13 LBS | RESPIRATION RATE: 17 BRPM | DIASTOLIC BLOOD PRESSURE: 79 MMHG

## 2024-05-02 DIAGNOSIS — O09.899 SUPERVISION OF OTHER HIGH RISK PREGNANCIES, UNSPECIFIED TRIMESTER: ICD-10-CM

## 2024-05-02 DIAGNOSIS — Z34.90 ENCOUNTER FOR SUPERVISION OF NORMAL PREGNANCY, UNSPECIFIED, UNSPECIFIED TRIMESTER: ICD-10-CM

## 2024-05-02 DIAGNOSIS — Z98.890 OTHER SPECIFIED POSTPROCEDURAL STATES: Chronic | ICD-10-CM

## 2024-05-02 DIAGNOSIS — Z34.00 ENCOUNTER FOR SUPERVISION OF NORMAL FIRST PREGNANCY, UNSPECIFIED TRIMESTER: ICD-10-CM

## 2024-05-02 DIAGNOSIS — Z87.59 PERSONAL HISTORY OF OTHER COMPLICATIONS OF PREGNANCY, CHILDBIRTH AND THE PUERPERIUM: ICD-10-CM

## 2024-05-02 DIAGNOSIS — O24.419 GESTATIONAL DIABETES MELLITUS IN PREGNANCY, UNSPECIFIED CONTROL: ICD-10-CM

## 2024-05-02 DIAGNOSIS — Z90.49 ACQUIRED ABSENCE OF OTHER SPECIFIED PARTS OF DIGESTIVE TRACT: Chronic | ICD-10-CM

## 2024-05-02 DIAGNOSIS — R39.9 UNSPECIFIED SYMPTOMS AND SIGNS INVOLVING THE GENITOURINARY SYSTEM: ICD-10-CM

## 2024-05-02 DIAGNOSIS — Z30.42 ENCOUNTER FOR SURVEILLANCE OF INJECTABLE CONTRACEPTIVE: ICD-10-CM

## 2024-05-02 PROCEDURE — 87591 N.GONORRHOEAE DNA AMP PROB: CPT

## 2024-05-02 PROCEDURE — 81513 NFCT DS BV RNA VAG FLU ALG: CPT

## 2024-05-02 PROCEDURE — 87491 CHLMYD TRACH DNA AMP PROBE: CPT

## 2024-05-02 PROCEDURE — 87661 TRICHOMONAS VAGINALIS AMPLIF: CPT

## 2024-05-02 PROCEDURE — 99213 OFFICE O/P EST LOW 20 MIN: CPT

## 2024-05-02 PROCEDURE — 87481 CANDIDA DNA AMP PROBE: CPT

## 2024-05-02 PROCEDURE — 87086 URINE CULTURE/COLONY COUNT: CPT

## 2024-05-02 PROCEDURE — 87186 SC STD MICRODIL/AGAR DIL: CPT

## 2024-05-02 PROCEDURE — G0463: CPT

## 2024-05-02 RX ORDER — MEDROXYPROGESTERONE ACETATE 150 MG/ML
150 INJECTION, SUSPENSION INTRAMUSCULAR
Qty: 1 | Refills: 3 | Status: ACTIVE | COMMUNITY
Start: 2024-05-02 | End: 1900-01-01

## 2024-05-02 NOTE — PLAN
[FreeTextEntry1] : urine culture sent Will notify of abnormal findings Return to clinic for either dose 2 of Depo or IUD insertion, patient undecided at this time

## 2024-05-02 NOTE — HISTORY OF PRESENT ILLNESS
[Postpartum Follow Up] : postpartum follow up [Delivery Date: ___] : on [unfilled] [] : delivered by vaginal delivery [Male] : Delivery History: baby boy [Wt. ___] : weighing [unfilled] [Breastfeeding] : currently nursing [Discharge HCT: ___] : hematocrit level was [unfilled] [Discharge HGB: ___] : hemoglobin level was [unfilled] [Resumed [place]] : has resumed intercourse [Intended Contraception] : Intended Contraception: [FreeTextEntry1] : Patient missed her 6-week postpartum appointment delivery date 2/14/2024 baby boy 6.14 LBS, delivery at [facility] patient states that is where her partner wanted to take her.   Patient complains of urinary frequency,   Patient received Depo 2/15/2024, undecided if she wants to continue or wants to change to ParaGard IUD  Edinberg depression scale score 14, patient is under extreme stress and is in the process of having to move out, going to court regarding her home, patient is now receiving public assistance and is planning to move.  Patient requesting a letter regarding her depression to take to court.  Letter was provided today.

## 2024-05-02 NOTE — HISTORY OF PRESENT ILLNESS
[Postpartum Follow Up] : postpartum follow up [Delivery Date: ___] : on [unfilled] [] : delivered by vaginal delivery [Male] : Delivery History: baby boy [Wt. ___] : weighing [unfilled] [Breastfeeding] : currently nursing [Discharge HCT: ___] : hematocrit level was [unfilled] [Discharge HGB: ___] : hemoglobin level was [unfilled] [Resumed [place]] : has resumed intercourse [Intended Contraception] : Intended Contraception: [FreeTextEntry1] : Patient missed her 6-week postpartum appointment delivery date 2/14/2024 baby boy 6.14 LBS, delivery at [facility] patient states that is where her partner wanted to take her.   Patient complains of urinary frequency,   Patient received Depo 2/15/2024, undecided if she wants to continue or wants to change to ParaGard IUD  Edinberg depression scale score 14, patient is under extreme stress and is in the process of having to move out, going to court regarding her home, patient is now receiving public assistance and is planning to move.  Patient requesting a letter regarding her depression to take to court.  Letter was provided today.

## 2024-05-03 DIAGNOSIS — R39.9 UNSPECIFIED SYMPTOMS AND SIGNS INVOLVING THE GENITOURINARY SYSTEM: ICD-10-CM

## 2024-05-03 DIAGNOSIS — Z30.42 ENCOUNTER FOR SURVEILLANCE OF INJECTABLE CONTRACEPTIVE: ICD-10-CM

## 2024-05-06 DIAGNOSIS — A49.9 URINARY TRACT INFECTION, SITE NOT SPECIFIED: ICD-10-CM

## 2024-05-06 DIAGNOSIS — B96.89 ACUTE VAGINITIS: ICD-10-CM

## 2024-05-06 DIAGNOSIS — N76.0 ACUTE VAGINITIS: ICD-10-CM

## 2024-05-06 DIAGNOSIS — N39.0 URINARY TRACT INFECTION, SITE NOT SPECIFIED: ICD-10-CM

## 2024-05-06 RX ORDER — METRONIDAZOLE 7.5 MG/G
0.75 GEL VAGINAL
Qty: 1 | Refills: 0 | Status: ACTIVE | COMMUNITY
Start: 2024-05-06 | End: 1900-01-01

## 2024-05-06 RX ORDER — NITROFURANTOIN (MONOHYDRATE/MACROCRYSTALS) 25; 75 MG/1; MG/1
100 CAPSULE ORAL
Qty: 14 | Refills: 0 | Status: ACTIVE | COMMUNITY
Start: 2024-05-06 | End: 1900-01-01

## 2024-05-07 ENCOUNTER — NON-APPOINTMENT (OUTPATIENT)
Age: 44
End: 2024-05-07

## 2024-05-07 LAB
BACTERIA UR CULT: ABNORMAL
BV BACTERIA RRNA VAG QL NAA+PROBE: DETECTED
C GLABRATA RNA VAG QL NAA+PROBE: NOT DETECTED
C TRACH RRNA SPEC QL NAA+PROBE: NOT DETECTED
CANDIDA RRNA VAG QL PROBE: NOT DETECTED
N GONORRHOEA RRNA SPEC QL NAA+PROBE: NOT DETECTED
T VAGINALIS RRNA SPEC QL NAA+PROBE: NOT DETECTED

## 2024-05-16 ENCOUNTER — APPOINTMENT (OUTPATIENT)
Dept: OBGYN | Facility: CLINIC | Age: 44
End: 2024-05-16

## 2024-08-22 ENCOUNTER — NON-APPOINTMENT (OUTPATIENT)
Age: 44
End: 2024-08-22

## 2024-08-30 NOTE — ED PROVIDER NOTE - DATE/TIME 1
07-Nov-2020 17:40 Complex Repair And Dermal Autograft Text: The defect edges were debeveled with a #15 scalpel blade.  The primary defect was closed partially with a complex linear closure.  Given the location of the defect, shape of the defect and the proximity to free margins an dermal autograft was deemed most appropriate to repair the remaining defect.  The graft was trimmed to fit the size of the remaining defect.  The graft was then placed in the primary defect, oriented appropriately, and sutured into place.

## 2024-09-23 ENCOUNTER — NON-APPOINTMENT (OUTPATIENT)
Age: 44
End: 2024-09-23

## 2024-09-24 ENCOUNTER — APPOINTMENT (OUTPATIENT)
Dept: OBGYN | Facility: CLINIC | Age: 44
End: 2024-09-24
Payer: MEDICAID

## 2024-09-24 ENCOUNTER — OUTPATIENT (OUTPATIENT)
Dept: OUTPATIENT SERVICES | Facility: HOSPITAL | Age: 44
LOS: 1 days | End: 2024-09-24
Payer: MEDICAID

## 2024-09-24 VITALS
BODY MASS INDEX: 32.25 KG/M2 | DIASTOLIC BLOOD PRESSURE: 76 MMHG | HEIGHT: 63 IN | RESPIRATION RATE: 18 BRPM | SYSTOLIC BLOOD PRESSURE: 119 MMHG | HEART RATE: 83 BPM | OXYGEN SATURATION: 99 % | WEIGHT: 182 LBS | TEMPERATURE: 97.7 F

## 2024-09-24 DIAGNOSIS — Z00.00 ENCOUNTER FOR GENERAL ADULT MEDICAL EXAMINATION WITHOUT ABNORMAL FINDINGS: ICD-10-CM

## 2024-09-24 DIAGNOSIS — Z98.890 OTHER SPECIFIED POSTPROCEDURAL STATES: Chronic | ICD-10-CM

## 2024-09-24 DIAGNOSIS — Z90.49 ACQUIRED ABSENCE OF OTHER SPECIFIED PARTS OF DIGESTIVE TRACT: Chronic | ICD-10-CM

## 2024-09-24 DIAGNOSIS — Z01.419 ENCOUNTER FOR GYNECOLOGICAL EXAMINATION (GENERAL) (ROUTINE) W/OUT ABNORMAL FINDINGS: ICD-10-CM

## 2024-09-24 PROCEDURE — 87491 CHLMYD TRACH DNA AMP PROBE: CPT

## 2024-09-24 PROCEDURE — 87661 TRICHOMONAS VAGINALIS AMPLIF: CPT

## 2024-09-24 PROCEDURE — G0463: CPT

## 2024-09-24 PROCEDURE — 87591 N.GONORRHOEAE DNA AMP PROB: CPT

## 2024-09-24 PROCEDURE — 87481 CANDIDA DNA AMP PROBE: CPT

## 2024-09-24 PROCEDURE — 99213 OFFICE O/P EST LOW 20 MIN: CPT

## 2024-09-24 PROCEDURE — 81513 NFCT DS BV RNA VAG FLU ALG: CPT

## 2024-09-24 NOTE — PHYSICAL EXAM
[Chaperone Present] : A chaperone was present in the examining room during all aspects of the physical examination [[id number]] : A chaperone was present during the pelvic exam. [FreeTextEntry2] : Idania [Appropriately responsive] : appropriately responsive [Soft] : soft [Oriented x3] : oriented x3 [Labia Minora] : normal [Labia Majora] : normal [Normal] : normal

## 2024-09-24 NOTE — PHYSICAL EXAM
[Chaperone Present] : A chaperone was present in the examining room during all aspects of the physical examination [[id number]] : A chaperone was present during the pelvic exam. [FreeTextEntry2] : Idania [Appropriately responsive] : appropriately responsive [Soft] : soft [Oriented x3] : oriented x3 [Labia Minora] : normal [Labia Majora] : normal [Normal] : normal

## 2024-09-24 NOTE — HISTORY OF PRESENT ILLNESS
[Normal Amount/Duration] :  normal amount and duration [TextBox_4] : Annual exam Patient going through a depressive episode due to recent infection and custody issues of her first 3 children Counseled by  today, referred for psych services Patient interested in ParaGard, sexually active without contraception, understands IUD must be placed on menses Needs mammo referral No GYN complaints currently Monthly menses [PapSmeardate] : 4/14/22 [TextBox_31] : wnl [HPVDate] : 4/14/22 [TextBox_78] : neg [FreeTextEntry1] : 8/28/24

## 2024-09-26 DIAGNOSIS — Z01.419 ENCOUNTER FOR GYNECOLOGICAL EXAMINATION (GENERAL) (ROUTINE) WITHOUT ABNORMAL FINDINGS: ICD-10-CM

## 2024-09-26 LAB
BV BACTERIA RRNA VAG QL NAA+PROBE: DETECTED
C GLABRATA RNA VAG QL NAA+PROBE: NOT DETECTED
C TRACH RRNA SPEC QL NAA+PROBE: NOT DETECTED
CANDIDA RRNA VAG QL PROBE: NOT DETECTED
N GONORRHOEA RRNA SPEC QL NAA+PROBE: NOT DETECTED
T VAGINALIS RRNA SPEC QL NAA+PROBE: NOT DETECTED

## 2024-12-10 NOTE — DISCHARGE NOTE PROVIDER - NSDCHHPTASSISTHOME_GEN_ALL_CORE
PATIENT HAS THE FOLLOWING DIAGNOSIS SUPPORTING ADMINISTRATION OF ALLERGY INJECTIONS: J30.1Allergic rhinitis due to pollen  AND 61820 MULTIPLE ALLERGY INJECTIONS FOR ALLERGY INJECTION ADMINISTRATION      Patient here for allergy injection today.  Patient was presented with the opportunity to speak with provider and ask questions regarding allergy injections.  Patient was also instructed they need to wait 30 minutes after receiving allergy injections. All risks associated with potential adverse effects have been explained to patient and patient handout was provided following allergy testing.    After consent obtained/verified, allergy injection subcutaneously given in back of arm(s).  Please see below for specific details of site injections, concentration of serum, and volume injected.    VIAL COLOR OF ALL VIALS TODAY IS red 1:1. Vial allergy w/v concentration today is: 1:1     ALLERGY INJECTION FROM VIAL A GIVEN right  UPPER ARM IN THE AMOUNT OF 0.10 ML    ALLERGY INJECTION FROM VIAL B GIVEN left lower ARM IN THE AMOUNT OF 0.10  ML    ALLERGY INJECTION FROM VIAL C GIVEN leftupper ARM IN THE AMOUNT OF 0.10 ML      Documentation of vial injection specific to arm(s) noted on Allergy Immunotherapy Administration Form.       Patient waited 30 minutes for observation.No  Patient has received information and verbalizes understanding of the potential  health risks associated with allergy injections . Patient understands risks including anaphylaxis and chooses not to wait 30 minutes after administration of allergy injection(s).    Patient tolerated well without adverse reaction while the patient was in the office.    SHOT REACTION TREATMENT INSTRUCTIONS    During the 30 minute wait after an allergy injection the following symptoms should be reported:    Itching other than at the injection site  Hives or swelling other than at the injection site  Redness other than at the injection site  Difficulty breathing  Chest 
Patient Needs Assistance to Leave Residence...

## 2024-12-18 NOTE — OB PROVIDER H&P - NS_TRIAGEMEMBRANE_OBGYN_ALL_OB_DT
What Type Of Note Output Would You Prefer (Optional)?: Standard Output How Severe Are Your Spot(S)?: moderate Have Your Spot(S) Been Treated In The Past?: has not been treated Hpi Title: Evaluation of Skin Lesions 14-Feb-2024 08:30

## 2024-12-27 ENCOUNTER — NON-APPOINTMENT (OUTPATIENT)
Age: 44
End: 2024-12-27

## 2024-12-31 ENCOUNTER — APPOINTMENT (OUTPATIENT)
Dept: OBGYN | Facility: CLINIC | Age: 44
End: 2024-12-31
Payer: MEDICAID

## 2024-12-31 ENCOUNTER — OUTPATIENT (OUTPATIENT)
Dept: OUTPATIENT SERVICES | Facility: HOSPITAL | Age: 44
LOS: 1 days | End: 2024-12-31
Payer: MEDICAID

## 2024-12-31 VITALS
DIASTOLIC BLOOD PRESSURE: 73 MMHG | TEMPERATURE: 98 F | HEIGHT: 63 IN | SYSTOLIC BLOOD PRESSURE: 112 MMHG | WEIGHT: 176 LBS | BODY MASS INDEX: 31.18 KG/M2 | OXYGEN SATURATION: 97 % | RESPIRATION RATE: 18 BRPM | HEART RATE: 86 BPM

## 2024-12-31 DIAGNOSIS — Z00.00 ENCOUNTER FOR GENERAL ADULT MEDICAL EXAMINATION WITHOUT ABNORMAL FINDINGS: ICD-10-CM

## 2024-12-31 DIAGNOSIS — Z3A.09 9 WEEKS GESTATION OF PREGNANCY: ICD-10-CM

## 2024-12-31 DIAGNOSIS — Z90.49 ACQUIRED ABSENCE OF OTHER SPECIFIED PARTS OF DIGESTIVE TRACT: Chronic | ICD-10-CM

## 2024-12-31 DIAGNOSIS — Z98.890 OTHER SPECIFIED POSTPROCEDURAL STATES: Chronic | ICD-10-CM

## 2024-12-31 DIAGNOSIS — R39.9 UNSPECIFIED SYMPTOMS AND SIGNS INVOLVING THE GENITOURINARY SYSTEM: ICD-10-CM

## 2024-12-31 DIAGNOSIS — O21.9 VOMITING OF PREGNANCY, UNSPECIFIED: ICD-10-CM

## 2024-12-31 PROCEDURE — 87086 URINE CULTURE/COLONY COUNT: CPT

## 2024-12-31 PROCEDURE — 81025 URINE PREGNANCY TEST: CPT

## 2024-12-31 PROCEDURE — 99213 OFFICE O/P EST LOW 20 MIN: CPT

## 2024-12-31 PROCEDURE — G0463: CPT

## 2024-12-31 RX ORDER — DOXYLAMINE SUCCINATE 25 MG
25 TABLET ORAL
Qty: 30 | Refills: 1 | Status: ACTIVE | COMMUNITY
Start: 2024-12-31 | End: 1900-01-01

## 2024-12-31 RX ORDER — CEFDINIR 300 MG/1
300 CAPSULE ORAL
Qty: 14 | Refills: 0 | Status: ACTIVE | COMMUNITY
Start: 2024-12-31 | End: 1900-01-01

## 2024-12-31 RX ORDER — PYRIDOXINE HCL (VITAMIN B6) 25 MG
25 TABLET ORAL
Qty: 90 | Refills: 1 | Status: ACTIVE | COMMUNITY
Start: 2024-12-31 | End: 1900-01-01

## 2024-12-31 RX ORDER — PRENATAL VIT NO.130/IRON/FOLIC 27MG-0.8MG
27-0.8 TABLET ORAL
Qty: 90 | Refills: 3 | Status: ACTIVE | COMMUNITY
Start: 2024-12-31 | End: 1900-01-01

## 2025-01-02 DIAGNOSIS — O21.9 VOMITING OF PREGNANCY, UNSPECIFIED: ICD-10-CM

## 2025-01-02 DIAGNOSIS — Z3A.09 9 WEEKS GESTATION OF PREGNANCY: ICD-10-CM

## 2025-01-02 DIAGNOSIS — R39.9 UNSPECIFIED SYMPTOMS AND SIGNS INVOLVING THE GENITOURINARY SYSTEM: ICD-10-CM

## 2025-01-03 ENCOUNTER — OUTPATIENT (OUTPATIENT)
Dept: OUTPATIENT SERVICES | Facility: HOSPITAL | Age: 45
LOS: 1 days | End: 2025-01-03

## 2025-01-03 ENCOUNTER — APPOINTMENT (OUTPATIENT)
Dept: OBGYN | Facility: CLINIC | Age: 45
End: 2025-01-03

## 2025-01-03 DIAGNOSIS — Z98.890 OTHER SPECIFIED POSTPROCEDURAL STATES: Chronic | ICD-10-CM

## 2025-01-03 DIAGNOSIS — Z90.49 ACQUIRED ABSENCE OF OTHER SPECIFIED PARTS OF DIGESTIVE TRACT: Chronic | ICD-10-CM

## 2025-01-03 DIAGNOSIS — Z00.00 ENCOUNTER FOR GENERAL ADULT MEDICAL EXAMINATION WITHOUT ABNORMAL FINDINGS: ICD-10-CM

## 2025-01-03 LAB — BACTERIA UR CULT: ABNORMAL

## 2025-01-03 PROCEDURE — 87186 SC STD MICRODIL/AGAR DIL: CPT

## 2025-01-10 ENCOUNTER — APPOINTMENT (OUTPATIENT)
Dept: OBGYN | Facility: CLINIC | Age: 45
End: 2025-01-10

## 2025-01-24 ENCOUNTER — NON-APPOINTMENT (OUTPATIENT)
Age: 45
End: 2025-01-24

## 2025-01-27 ENCOUNTER — OUTPATIENT (OUTPATIENT)
Dept: OUTPATIENT SERVICES | Facility: HOSPITAL | Age: 45
LOS: 1 days | End: 2025-01-27
Payer: MEDICAID

## 2025-01-27 ENCOUNTER — APPOINTMENT (OUTPATIENT)
Dept: OBGYN | Facility: CLINIC | Age: 45
End: 2025-01-27
Payer: MEDICAID

## 2025-01-27 ENCOUNTER — NON-APPOINTMENT (OUTPATIENT)
Age: 45
End: 2025-01-27

## 2025-01-27 VITALS
HEART RATE: 71 BPM | TEMPERATURE: 98.4 F | WEIGHT: 178 LBS | SYSTOLIC BLOOD PRESSURE: 105 MMHG | HEIGHT: 63 IN | OXYGEN SATURATION: 97 % | BODY MASS INDEX: 31.54 KG/M2 | DIASTOLIC BLOOD PRESSURE: 69 MMHG | RESPIRATION RATE: 18 BRPM

## 2025-01-27 DIAGNOSIS — Z34.90 ENCOUNTER FOR SUPERVISION OF NORMAL PREGNANCY, UNSPECIFIED, UNSPECIFIED TRIMESTER: ICD-10-CM

## 2025-01-27 DIAGNOSIS — Z00.00 ENCOUNTER FOR GENERAL ADULT MEDICAL EXAMINATION WITHOUT ABNORMAL FINDINGS: ICD-10-CM

## 2025-01-27 DIAGNOSIS — Z90.49 ACQUIRED ABSENCE OF OTHER SPECIFIED PARTS OF DIGESTIVE TRACT: Chronic | ICD-10-CM

## 2025-01-27 DIAGNOSIS — Z30.42 ENCOUNTER FOR SURVEILLANCE OF INJECTABLE CONTRACEPTIVE: ICD-10-CM

## 2025-01-27 DIAGNOSIS — O09.299 SUPERVISION OF PREGNANCY WITH OTHER POOR REPRODUCTIVE OR OBSTETRIC HISTORY, UNSPECIFIED TRIMESTER: ICD-10-CM

## 2025-01-27 DIAGNOSIS — O09.529 SUPERVISION OF ELDERLY MULTIGRAVIDA, UNSPECIFIED TRIMESTER: ICD-10-CM

## 2025-01-27 DIAGNOSIS — Z98.890 OTHER SPECIFIED POSTPROCEDURAL STATES: Chronic | ICD-10-CM

## 2025-01-27 DIAGNOSIS — Z3A.09 9 WEEKS GESTATION OF PREGNANCY: ICD-10-CM

## 2025-01-27 DIAGNOSIS — Z01.419 ENCOUNTER FOR GYNECOLOGICAL EXAMINATION (GENERAL) (ROUTINE) W/OUT ABNORMAL FINDINGS: ICD-10-CM

## 2025-01-27 PROCEDURE — 86850 RBC ANTIBODY SCREEN: CPT

## 2025-01-27 PROCEDURE — 87491 CHLMYD TRACH DNA AMP PROBE: CPT

## 2025-01-27 PROCEDURE — 83036 HEMOGLOBIN GLYCOSYLATED A1C: CPT

## 2025-01-27 PROCEDURE — 86787 VARICELLA-ZOSTER ANTIBODY: CPT

## 2025-01-27 PROCEDURE — 81025 URINE PREGNANCY TEST: CPT

## 2025-01-27 PROCEDURE — 86480 TB TEST CELL IMMUN MEASURE: CPT

## 2025-01-27 PROCEDURE — 87389 HIV-1 AG W/HIV-1&-2 AB AG IA: CPT

## 2025-01-27 PROCEDURE — 86765 RUBEOLA ANTIBODY: CPT

## 2025-01-27 PROCEDURE — 80053 COMPREHEN METABOLIC PANEL: CPT

## 2025-01-27 PROCEDURE — 87591 N.GONORRHOEAE DNA AMP PROB: CPT

## 2025-01-27 PROCEDURE — 86803 HEPATITIS C AB TEST: CPT

## 2025-01-27 PROCEDURE — G0463: CPT

## 2025-01-27 PROCEDURE — 84550 ASSAY OF BLOOD/URIC ACID: CPT

## 2025-01-27 PROCEDURE — 84443 ASSAY THYROID STIM HORMONE: CPT

## 2025-01-27 PROCEDURE — 87340 HEPATITIS B SURFACE AG IA: CPT

## 2025-01-27 PROCEDURE — 86762 RUBELLA ANTIBODY: CPT

## 2025-01-27 PROCEDURE — 81422 FETAL CHRMOML MICRODELTJ: CPT

## 2025-01-27 PROCEDURE — 83655 ASSAY OF LEAD: CPT

## 2025-01-27 PROCEDURE — 82570 ASSAY OF URINE CREATININE: CPT

## 2025-01-27 PROCEDURE — 86900 BLOOD TYPING SEROLOGIC ABO: CPT

## 2025-01-27 PROCEDURE — 84156 ASSAY OF PROTEIN URINE: CPT

## 2025-01-27 PROCEDURE — 99214 OFFICE O/P EST MOD 30 MIN: CPT

## 2025-01-27 PROCEDURE — 81420 FETAL CHRMOML ANEUPLOIDY: CPT

## 2025-01-27 PROCEDURE — 81003 URINALYSIS AUTO W/O SCOPE: CPT

## 2025-01-27 PROCEDURE — 87624 HPV HI-RISK TYP POOLED RSLT: CPT

## 2025-01-27 PROCEDURE — 85025 COMPLETE CBC W/AUTO DIFF WBC: CPT

## 2025-01-27 PROCEDURE — 86780 TREPONEMA PALLIDUM: CPT

## 2025-01-28 DIAGNOSIS — O09.299 SUPERVISION OF PREGNANCY WITH OTHER POOR REPRODUCTIVE OR OBSTETRIC HISTORY, UNSPECIFIED TRIMESTER: ICD-10-CM

## 2025-01-28 DIAGNOSIS — O09.529 SUPERVISION OF ELDERLY MULTIGRAVIDA, UNSPECIFIED TRIMESTER: ICD-10-CM

## 2025-01-28 DIAGNOSIS — Z3A.13 13 WEEKS GESTATION OF PREGNANCY: ICD-10-CM

## 2025-01-28 LAB
ABO + RH PNL BLD: NORMAL
ALBUMIN SERPL ELPH-MCNC: 4.1 G/DL
ALP BLD-CCNC: 71 U/L
ALT SERPL-CCNC: 10 U/L
ANION GAP SERPL CALC-SCNC: 14 MMOL/L
AST SERPL-CCNC: 10 U/L
BASOPHILS # BLD AUTO: 0.01 K/UL
BASOPHILS NFR BLD AUTO: 0.1 %
BILIRUB SERPL-MCNC: 0.2 MG/DL
BLD GP AB SCN SERPL QL: NORMAL
BUN SERPL-MCNC: 10 MG/DL
C TRACH RRNA SPEC QL NAA+PROBE: NOT DETECTED
CALCIUM SERPL-MCNC: 9.5 MG/DL
CHLORIDE SERPL-SCNC: 102 MMOL/L
CO2 SERPL-SCNC: 21 MMOL/L
CREAT SERPL-MCNC: 0.54 MG/DL
CREAT SPEC-SCNC: 150 MG/DL
CREAT/PROT UR: 0.1 RATIO
EGFR: 118 ML/MIN/1.73M2
EOSINOPHIL # BLD AUTO: 0.08 K/UL
EOSINOPHIL NFR BLD AUTO: 0.8 %
ESTIMATED AVERAGE GLUCOSE: 105 MG/DL
GLUCOSE SERPL-MCNC: 74 MG/DL
HBA1C MFR BLD HPLC: 5.3 %
HBV SURFACE AG SER QL: NONREACTIVE
HCT VFR BLD CALC: 36.2 %
HCV AB SER QL: NONREACTIVE
HCV S/CO RATIO: 0.06 S/CO
HGB BLD-MCNC: 11.9 G/DL
HIV1+2 AB SPEC QL IA.RAPID: NONREACTIVE
HPV HIGH+LOW RISK DNA PNL CVX: NOT DETECTED
IMM GRANULOCYTES NFR BLD AUTO: 0.1 %
LYMPHOCYTES # BLD AUTO: 3.07 K/UL
LYMPHOCYTES NFR BLD AUTO: 32.6 %
MAN DIFF?: NORMAL
MCHC RBC-ENTMCNC: 30.7 PG
MCHC RBC-ENTMCNC: 32.9 G/DL
MCV RBC AUTO: 93.5 FL
MEV IGG FLD QL IA: <5 AU/ML
MEV IGG+IGM SER-IMP: NEGATIVE
MONOCYTES # BLD AUTO: 0.37 K/UL
MONOCYTES NFR BLD AUTO: 3.9 %
N GONORRHOEA RRNA SPEC QL NAA+PROBE: NOT DETECTED
NEUTROPHILS # BLD AUTO: 5.89 K/UL
NEUTROPHILS NFR BLD AUTO: 62.5 %
PLATELET # BLD AUTO: 350 K/UL
POTASSIUM SERPL-SCNC: 3.8 MMOL/L
PROT SERPL-MCNC: 6.9 G/DL
PROT UR-MCNC: 11 MG/DL
RBC # BLD: 3.87 M/UL
RBC # FLD: 13.5 %
RUBV IGG FLD-ACNC: 5.24 INDEX
RUBV IGG SER-IMP: POSITIVE
SODIUM SERPL-SCNC: 137 MMOL/L
SOURCE TP AMPLIFICATION: NORMAL
T PALLIDUM AB SER QL IA: NEGATIVE
TSH SERPL-ACNC: 0.67 UIU/ML
URATE SERPL-MCNC: 3.4 MG/DL
VZV AB TITR SER: NEGATIVE
VZV IGG SER IF-ACNC: 0.67 S/CO
WBC # FLD AUTO: 9.43 K/UL

## 2025-01-29 LAB — LEAD BLD-MCNC: <1 UG/DL

## 2025-01-30 ENCOUNTER — APPOINTMENT (OUTPATIENT)
Dept: ANTEPARTUM | Facility: CLINIC | Age: 45
End: 2025-01-30
Payer: MEDICAID

## 2025-01-30 ENCOUNTER — ASOB RESULT (OUTPATIENT)
Age: 45
End: 2025-01-30

## 2025-01-30 LAB — CYTOLOGY CVX/VAG DOC THIN PREP: NORMAL

## 2025-01-30 PROCEDURE — 76813 OB US NUCHAL MEAS 1 GEST: CPT

## 2025-01-31 LAB
M TB IFN-G BLD-IMP: NEGATIVE
QUANTIFERON TB PLUS MITOGEN MINUS NIL: 1.15 IU/ML
QUANTIFERON TB PLUS NIL: 0.02 IU/ML
QUANTIFERON TB PLUS TB1 MINUS NIL: 0.07 IU/ML
QUANTIFERON TB PLUS TB2 MINUS NIL: 0.15 IU/ML

## 2025-02-03 LAB
CHROMOSOME13 INTERPRETATION: NORMAL
CHROMOSOME13 TEST RESULT: NORMAL
CHROMOSOME18 INTERPRETATION: NORMAL
CHROMOSOME18 TEST RESULT: NORMAL
CHROMOSOME21 INTERPRETATION: NORMAL
CHROMOSOME21 TEST RESULT: NORMAL
FETAL FRACTION: NORMAL
MICRODELETIONS INTERPRETATION: NORMAL
MICRODELETIONS RESULT: NORMAL
PERFORMANCE AND LIMITATIONS: NORMAL
SEX CHROMOSOME INTERPRETATION: NORMAL
SEX CHROMOSOME TEST RESULT: NORMAL
VERIFI WITH MICRODELETIONS: NOT DETECTED

## 2025-02-21 ENCOUNTER — NON-APPOINTMENT (OUTPATIENT)
Age: 45
End: 2025-02-21

## 2025-02-24 ENCOUNTER — OUTPATIENT (OUTPATIENT)
Dept: OUTPATIENT SERVICES | Facility: HOSPITAL | Age: 45
LOS: 1 days | End: 2025-02-24
Payer: MEDICAID

## 2025-02-24 ENCOUNTER — LABORATORY RESULT (OUTPATIENT)
Age: 45
End: 2025-02-24

## 2025-02-24 ENCOUNTER — APPOINTMENT (OUTPATIENT)
Dept: OBGYN | Facility: CLINIC | Age: 45
End: 2025-02-24
Payer: MEDICAID

## 2025-02-24 ENCOUNTER — APPOINTMENT (OUTPATIENT)
Dept: ANTEPARTUM | Facility: CLINIC | Age: 45
End: 2025-02-24
Payer: MEDICAID

## 2025-02-24 ENCOUNTER — ASOB RESULT (OUTPATIENT)
Age: 45
End: 2025-02-24

## 2025-02-24 ENCOUNTER — APPOINTMENT (OUTPATIENT)
Dept: MATERNAL FETAL MEDICINE | Facility: CLINIC | Age: 45
End: 2025-02-24
Payer: MEDICAID

## 2025-02-24 VITALS
SYSTOLIC BLOOD PRESSURE: 99 MMHG | BODY MASS INDEX: 32.43 KG/M2 | HEART RATE: 71 BPM | WEIGHT: 183 LBS | DIASTOLIC BLOOD PRESSURE: 64 MMHG | HEIGHT: 63 IN

## 2025-02-24 DIAGNOSIS — Z34.90 ENCOUNTER FOR SUPERVISION OF NORMAL PREGNANCY, UNSPECIFIED, UNSPECIFIED TRIMESTER: ICD-10-CM

## 2025-02-24 DIAGNOSIS — Z00.00 ENCOUNTER FOR GENERAL ADULT MEDICAL EXAMINATION WITHOUT ABNORMAL FINDINGS: ICD-10-CM

## 2025-02-24 DIAGNOSIS — O09.899 SUPERVISION OF OTHER HIGH RISK PREGNANCIES, UNSPECIFIED TRIMESTER: ICD-10-CM

## 2025-02-24 DIAGNOSIS — Z23 ENCOUNTER FOR IMMUNIZATION: ICD-10-CM

## 2025-02-24 DIAGNOSIS — Z90.49 ACQUIRED ABSENCE OF OTHER SPECIFIED PARTS OF DIGESTIVE TRACT: Chronic | ICD-10-CM

## 2025-02-24 DIAGNOSIS — O09.529 SUPERVISION OF ELDERLY MULTIGRAVIDA, UNSPECIFIED TRIMESTER: ICD-10-CM

## 2025-02-24 DIAGNOSIS — Z98.890 OTHER SPECIFIED POSTPROCEDURAL STATES: Chronic | ICD-10-CM

## 2025-02-24 PROCEDURE — 36415 COLL VENOUS BLD VENIPUNCTURE: CPT

## 2025-02-24 PROCEDURE — G0463: CPT

## 2025-02-24 PROCEDURE — 99213 OFFICE O/P EST LOW 20 MIN: CPT

## 2025-02-24 PROCEDURE — 76805 OB US >/= 14 WKS SNGL FETUS: CPT

## 2025-02-24 PROCEDURE — 76817 TRANSVAGINAL US OBSTETRIC: CPT

## 2025-02-24 PROCEDURE — 86147 CARDIOLIPIN ANTIBODY EA IG: CPT

## 2025-02-24 PROCEDURE — 87086 URINE CULTURE/COLONY COUNT: CPT

## 2025-02-24 PROCEDURE — 87077 CULTURE AEROBIC IDENTIFY: CPT

## 2025-02-24 PROCEDURE — 85613 RUSSELL VIPER VENOM DILUTED: CPT

## 2025-02-24 PROCEDURE — 81003 URINALYSIS AUTO W/O SCOPE: CPT

## 2025-02-24 PROCEDURE — 82105 ALPHA-FETOPROTEIN SERUM: CPT

## 2025-02-24 PROCEDURE — 86146 BETA-2 GLYCOPROTEIN ANTIBODY: CPT

## 2025-02-24 PROCEDURE — 85730 THROMBOPLASTIN TIME PARTIAL: CPT

## 2025-02-24 PROCEDURE — 99214 OFFICE O/P EST MOD 30 MIN: CPT | Mod: 25

## 2025-02-25 DIAGNOSIS — O09.899 SUPERVISION OF OTHER HIGH RISK PREGNANCIES, UNSPECIFIED TRIMESTER: ICD-10-CM

## 2025-02-25 DIAGNOSIS — O09.529 SUPERVISION OF ELDERLY MULTIGRAVIDA, UNSPECIFIED TRIMESTER: ICD-10-CM

## 2025-02-25 DIAGNOSIS — Z34.90 ENCOUNTER FOR SUPERVISION OF NORMAL PREGNANCY, UNSPECIFIED, UNSPECIFIED TRIMESTER: ICD-10-CM

## 2025-02-25 LAB
AFP INTERP SERPL-IMP: NORMAL
AFP INTERP SERPL-IMP: NORMAL
AFP MOM CUT-OFF: 2.5
AFP MOM SERPL: 1.95
AFP PERCENTILE: 98.1
AFP SERPL-ACNC: 64.94 NG/ML
CARBAMAZEPINE?: NO
CURRENT SMOKER: NORMAL
DIABETES STATUS PATIENT: NORMAL
GA: NORMAL
GESTATIONAL AGE METHOD: NORMAL
HX OF NTD NARR: NORMAL
LUPUS ANTICOAGULANT CASCADE REFLEX: NORMAL
MULTIPLE PREGNANCY: NORMAL
NEURAL TUBE DEFECT RISK FETUS: NORMAL
NEURAL TUBE DEFECT RISK POP: NORMAL
RECOM F/U: NO
TEST PERFORMANCE INFO SPEC: NORMAL
VALPROIC ACID?: NORMAL

## 2025-02-27 LAB — BACTERIA UR CULT: ABNORMAL

## 2025-03-12 ENCOUNTER — ASOB RESULT (OUTPATIENT)
Age: 45
End: 2025-03-12

## 2025-03-12 ENCOUNTER — APPOINTMENT (OUTPATIENT)
Dept: ANTEPARTUM | Facility: CLINIC | Age: 45
End: 2025-03-12
Payer: MEDICAID

## 2025-03-12 PROCEDURE — 76817 TRANSVAGINAL US OBSTETRIC: CPT

## 2025-03-12 PROCEDURE — 99213 OFFICE O/P EST LOW 20 MIN: CPT | Mod: 25

## 2025-03-20 ENCOUNTER — APPOINTMENT (OUTPATIENT)
Dept: ANTEPARTUM | Facility: CLINIC | Age: 45
End: 2025-03-20
Payer: MEDICAID

## 2025-03-20 ENCOUNTER — ASOB RESULT (OUTPATIENT)
Age: 45
End: 2025-03-20

## 2025-03-20 PROCEDURE — 76817 TRANSVAGINAL US OBSTETRIC: CPT

## 2025-03-20 PROCEDURE — 76811 OB US DETAILED SNGL FETUS: CPT

## 2025-03-31 ENCOUNTER — NON-APPOINTMENT (OUTPATIENT)
Age: 45
End: 2025-03-31

## 2025-04-03 ENCOUNTER — NON-APPOINTMENT (OUTPATIENT)
Age: 45
End: 2025-04-03

## 2025-04-03 ENCOUNTER — OUTPATIENT (OUTPATIENT)
Dept: OUTPATIENT SERVICES | Facility: HOSPITAL | Age: 45
LOS: 1 days | End: 2025-04-03
Payer: MEDICAID

## 2025-04-03 ENCOUNTER — APPOINTMENT (OUTPATIENT)
Dept: OBGYN | Facility: CLINIC | Age: 45
End: 2025-04-03
Payer: MEDICAID

## 2025-04-03 ENCOUNTER — APPOINTMENT (OUTPATIENT)
Dept: ANTEPARTUM | Facility: CLINIC | Age: 45
End: 2025-04-03
Payer: MEDICAID

## 2025-04-03 ENCOUNTER — ASOB RESULT (OUTPATIENT)
Age: 45
End: 2025-04-03

## 2025-04-03 VITALS
WEIGHT: 184 LBS | SYSTOLIC BLOOD PRESSURE: 91 MMHG | HEIGHT: 63 IN | TEMPERATURE: 97.4 F | HEART RATE: 82 BPM | RESPIRATION RATE: 18 BRPM | DIASTOLIC BLOOD PRESSURE: 53 MMHG | OXYGEN SATURATION: 98 % | BODY MASS INDEX: 32.6 KG/M2

## 2025-04-03 DIAGNOSIS — O09.529 SUPERVISION OF ELDERLY MULTIGRAVIDA, UNSPECIFIED TRIMESTER: ICD-10-CM

## 2025-04-03 DIAGNOSIS — Z34.00 ENCOUNTER FOR SUPERVISION OF NORMAL FIRST PREGNANCY, UNSPECIFIED TRIMESTER: ICD-10-CM

## 2025-04-03 DIAGNOSIS — O26.879 CERVICAL SHORTENING, UNSPECIFIED TRIMESTER: ICD-10-CM

## 2025-04-03 DIAGNOSIS — Z98.890 OTHER SPECIFIED POSTPROCEDURAL STATES: Chronic | ICD-10-CM

## 2025-04-03 DIAGNOSIS — O09.899 SUPERVISION OF OTHER HIGH RISK PREGNANCIES, UNSPECIFIED TRIMESTER: ICD-10-CM

## 2025-04-03 DIAGNOSIS — Z34.90 ENCOUNTER FOR SUPERVISION OF NORMAL PREGNANCY, UNSPECIFIED, UNSPECIFIED TRIMESTER: ICD-10-CM

## 2025-04-03 DIAGNOSIS — Z90.49 ACQUIRED ABSENCE OF OTHER SPECIFIED PARTS OF DIGESTIVE TRACT: Chronic | ICD-10-CM

## 2025-04-03 PROCEDURE — 99213 OFFICE O/P EST LOW 20 MIN: CPT

## 2025-04-03 PROCEDURE — 76815 OB US LIMITED FETUS(S): CPT

## 2025-04-03 PROCEDURE — 87086 URINE CULTURE/COLONY COUNT: CPT

## 2025-04-03 PROCEDURE — 76817 TRANSVAGINAL US OBSTETRIC: CPT

## 2025-04-03 PROCEDURE — 99214 OFFICE O/P EST MOD 30 MIN: CPT | Mod: 25

## 2025-04-03 PROCEDURE — 87077 CULTURE AEROBIC IDENTIFY: CPT

## 2025-04-03 PROCEDURE — 81003 URINALYSIS AUTO W/O SCOPE: CPT

## 2025-04-03 PROCEDURE — G0463: CPT

## 2025-04-03 RX ORDER — PROGESTERONE 200 MG/1
200 CAPSULE ORAL
Qty: 130 | Refills: 0 | Status: ACTIVE | COMMUNITY
Start: 2025-04-03 | End: 1900-01-01

## 2025-04-07 DIAGNOSIS — O26.879 CERVICAL SHORTENING, UNSPECIFIED TRIMESTER: ICD-10-CM

## 2025-04-07 DIAGNOSIS — O09.529 SUPERVISION OF ELDERLY MULTIGRAVIDA, UNSPECIFIED TRIMESTER: ICD-10-CM

## 2025-04-07 DIAGNOSIS — O09.899 SUPERVISION OF OTHER HIGH RISK PREGNANCIES, UNSPECIFIED TRIMESTER: ICD-10-CM

## 2025-04-07 LAB — BACTERIA UR CULT: NORMAL

## 2025-04-08 ENCOUNTER — APPOINTMENT (OUTPATIENT)
Dept: PEDIATRIC CARDIOLOGY | Facility: CLINIC | Age: 45
End: 2025-04-08
Payer: MEDICAID

## 2025-04-08 PROCEDURE — 76820 UMBILICAL ARTERY ECHO: CPT | Mod: 26

## 2025-04-08 PROCEDURE — 76827 ECHO EXAM OF FETAL HEART: CPT

## 2025-04-08 PROCEDURE — 76821 MIDDLE CEREBRAL ARTERY ECHO: CPT | Mod: 26

## 2025-04-08 PROCEDURE — 99203 OFFICE O/P NEW LOW 30 MIN: CPT | Mod: 25

## 2025-04-08 PROCEDURE — 76825 ECHO EXAM OF FETAL HEART: CPT

## 2025-04-10 ENCOUNTER — ASOB RESULT (OUTPATIENT)
Age: 45
End: 2025-04-10

## 2025-04-10 ENCOUNTER — APPOINTMENT (OUTPATIENT)
Dept: ANTEPARTUM | Facility: CLINIC | Age: 45
End: 2025-04-10
Payer: MEDICAID

## 2025-04-10 PROCEDURE — 76817 TRANSVAGINAL US OBSTETRIC: CPT

## 2025-04-10 PROCEDURE — 76816 OB US FOLLOW-UP PER FETUS: CPT

## 2025-04-18 ENCOUNTER — APPOINTMENT (OUTPATIENT)
Dept: ANTEPARTUM | Facility: CLINIC | Age: 45
End: 2025-04-18

## 2025-04-30 ENCOUNTER — NON-APPOINTMENT (OUTPATIENT)
Age: 45
End: 2025-04-30

## 2025-05-01 ENCOUNTER — OUTPATIENT (OUTPATIENT)
Dept: OUTPATIENT SERVICES | Facility: HOSPITAL | Age: 45
LOS: 1 days | End: 2025-05-01
Payer: MEDICAID

## 2025-05-01 ENCOUNTER — APPOINTMENT (OUTPATIENT)
Dept: OBGYN | Facility: CLINIC | Age: 45
End: 2025-05-01
Payer: MEDICAID

## 2025-05-01 ENCOUNTER — ASOB RESULT (OUTPATIENT)
Age: 45
End: 2025-05-01

## 2025-05-01 ENCOUNTER — APPOINTMENT (OUTPATIENT)
Dept: ANTEPARTUM | Facility: CLINIC | Age: 45
End: 2025-05-01
Payer: MEDICAID

## 2025-05-01 VITALS — SYSTOLIC BLOOD PRESSURE: 96 MMHG | HEART RATE: 85 BPM | DIASTOLIC BLOOD PRESSURE: 49 MMHG

## 2025-05-01 VITALS
TEMPERATURE: 97.3 F | SYSTOLIC BLOOD PRESSURE: 106 MMHG | BODY MASS INDEX: 33.31 KG/M2 | RESPIRATION RATE: 18 BRPM | OXYGEN SATURATION: 99 % | DIASTOLIC BLOOD PRESSURE: 68 MMHG | WEIGHT: 188 LBS | HEART RATE: 89 BPM | HEIGHT: 63 IN

## 2025-05-01 DIAGNOSIS — Z34.90 ENCOUNTER FOR SUPERVISION OF NORMAL PREGNANCY, UNSPECIFIED, UNSPECIFIED TRIMESTER: ICD-10-CM

## 2025-05-01 DIAGNOSIS — Z34.00 ENCOUNTER FOR SUPERVISION OF NORMAL FIRST PREGNANCY, UNSPECIFIED TRIMESTER: ICD-10-CM

## 2025-05-01 DIAGNOSIS — O09.529 SUPERVISION OF ELDERLY MULTIGRAVIDA, UNSPECIFIED TRIMESTER: ICD-10-CM

## 2025-05-01 DIAGNOSIS — Z98.890 OTHER SPECIFIED POSTPROCEDURAL STATES: Chronic | ICD-10-CM

## 2025-05-01 DIAGNOSIS — O09.899 SUPERVISION OF OTHER HIGH RISK PREGNANCIES, UNSPECIFIED TRIMESTER: ICD-10-CM

## 2025-05-01 DIAGNOSIS — Z90.49 ACQUIRED ABSENCE OF OTHER SPECIFIED PARTS OF DIGESTIVE TRACT: Chronic | ICD-10-CM

## 2025-05-01 DIAGNOSIS — O26.879 CERVICAL SHORTENING, UNSPECIFIED TRIMESTER: ICD-10-CM

## 2025-05-01 PROCEDURE — 81003 URINALYSIS AUTO W/O SCOPE: CPT

## 2025-05-01 PROCEDURE — 86780 TREPONEMA PALLIDUM: CPT

## 2025-05-01 PROCEDURE — 87186 SC STD MICRODIL/AGAR DIL: CPT

## 2025-05-01 PROCEDURE — G0463: CPT

## 2025-05-01 PROCEDURE — 85025 COMPLETE CBC W/AUTO DIFF WBC: CPT

## 2025-05-01 PROCEDURE — 82950 GLUCOSE TEST: CPT

## 2025-05-01 PROCEDURE — 87086 URINE CULTURE/COLONY COUNT: CPT

## 2025-05-01 PROCEDURE — 76816 OB US FOLLOW-UP PER FETUS: CPT

## 2025-05-01 PROCEDURE — 99213 OFFICE O/P EST LOW 20 MIN: CPT

## 2025-05-02 ENCOUNTER — NON-APPOINTMENT (OUTPATIENT)
Age: 45
End: 2025-05-02

## 2025-05-02 LAB
BASOPHILS # BLD AUTO: 0.01 K/UL
BASOPHILS NFR BLD AUTO: 0.1 %
EOSINOPHIL # BLD AUTO: 0.07 K/UL
EOSINOPHIL NFR BLD AUTO: 0.7 %
GLUCOSE 1H P 50 G GLC PO SERPL-MCNC: 140 MG/DL
HCT VFR BLD CALC: 34 %
HGB BLD-MCNC: 11.1 G/DL
IMM GRANULOCYTES NFR BLD AUTO: 0.6 %
LYMPHOCYTES # BLD AUTO: 2.37 K/UL
LYMPHOCYTES NFR BLD AUTO: 22.8 %
MAN DIFF?: NORMAL
MCHC RBC-ENTMCNC: 30.9 PG
MCHC RBC-ENTMCNC: 32.6 G/DL
MCV RBC AUTO: 94.7 FL
MONOCYTES # BLD AUTO: 0.41 K/UL
MONOCYTES NFR BLD AUTO: 3.9 %
NEUTROPHILS # BLD AUTO: 7.47 K/UL
NEUTROPHILS NFR BLD AUTO: 71.9 %
PLATELET # BLD AUTO: 321 K/UL
RBC # BLD: 3.59 M/UL
RBC # FLD: 13.5 %
T PALLIDUM AB SER QL IA: NEGATIVE
WBC # FLD AUTO: 10.39 K/UL

## 2025-05-05 DIAGNOSIS — O26.879 CERVICAL SHORTENING, UNSPECIFIED TRIMESTER: ICD-10-CM

## 2025-05-05 DIAGNOSIS — O09.899 SUPERVISION OF OTHER HIGH RISK PREGNANCIES, UNSPECIFIED TRIMESTER: ICD-10-CM

## 2025-05-05 DIAGNOSIS — O09.529 SUPERVISION OF ELDERLY MULTIGRAVIDA, UNSPECIFIED TRIMESTER: ICD-10-CM

## 2025-05-05 LAB — BACTERIA UR CULT: ABNORMAL

## 2025-05-05 RX ORDER — NITROFURANTOIN (MONOHYDRATE/MACROCRYSTALS) 25; 75 MG/1; MG/1
100 CAPSULE ORAL
Qty: 14 | Refills: 0 | Status: ACTIVE | COMMUNITY
Start: 2025-05-05 | End: 1900-01-01

## 2025-05-15 ENCOUNTER — NON-APPOINTMENT (OUTPATIENT)
Age: 45
End: 2025-05-15

## 2025-05-15 ENCOUNTER — APPOINTMENT (OUTPATIENT)
Dept: OBGYN | Facility: CLINIC | Age: 45
End: 2025-05-15
Payer: MEDICAID

## 2025-05-15 ENCOUNTER — OUTPATIENT (OUTPATIENT)
Dept: OUTPATIENT SERVICES | Facility: HOSPITAL | Age: 45
LOS: 1 days | End: 2025-05-15
Payer: MEDICAID

## 2025-05-15 VITALS
OXYGEN SATURATION: 98 % | RESPIRATION RATE: 18 BRPM | TEMPERATURE: 97.9 F | HEART RATE: 92 BPM | BODY MASS INDEX: 33.49 KG/M2 | WEIGHT: 189 LBS | SYSTOLIC BLOOD PRESSURE: 101 MMHG | DIASTOLIC BLOOD PRESSURE: 66 MMHG | HEIGHT: 63 IN

## 2025-05-15 DIAGNOSIS — Z90.49 ACQUIRED ABSENCE OF OTHER SPECIFIED PARTS OF DIGESTIVE TRACT: Chronic | ICD-10-CM

## 2025-05-15 DIAGNOSIS — A49.9 URINARY TRACT INFECTION, SITE NOT SPECIFIED: ICD-10-CM

## 2025-05-15 DIAGNOSIS — Z34.00 ENCOUNTER FOR SUPERVISION OF NORMAL FIRST PREGNANCY, UNSPECIFIED TRIMESTER: ICD-10-CM

## 2025-05-15 DIAGNOSIS — O09.899 SUPERVISION OF OTHER HIGH RISK PREGNANCIES, UNSPECIFIED TRIMESTER: ICD-10-CM

## 2025-05-15 DIAGNOSIS — O09.529 SUPERVISION OF ELDERLY MULTIGRAVIDA, UNSPECIFIED TRIMESTER: ICD-10-CM

## 2025-05-15 DIAGNOSIS — Z98.890 OTHER SPECIFIED POSTPROCEDURAL STATES: Chronic | ICD-10-CM

## 2025-05-15 DIAGNOSIS — O24.419 GESTATIONAL DIABETES MELLITUS IN PREGNANCY, UNSPECIFIED CONTROL: ICD-10-CM

## 2025-05-15 DIAGNOSIS — N39.0 URINARY TRACT INFECTION, SITE NOT SPECIFIED: ICD-10-CM

## 2025-05-15 DIAGNOSIS — Z34.90 ENCOUNTER FOR SUPERVISION OF NORMAL PREGNANCY, UNSPECIFIED, UNSPECIFIED TRIMESTER: ICD-10-CM

## 2025-05-15 PROCEDURE — 87186 SC STD MICRODIL/AGAR DIL: CPT

## 2025-05-15 PROCEDURE — 81003 URINALYSIS AUTO W/O SCOPE: CPT

## 2025-05-15 PROCEDURE — G0463: CPT

## 2025-05-15 PROCEDURE — 87086 URINE CULTURE/COLONY COUNT: CPT

## 2025-05-15 PROCEDURE — 99213 OFFICE O/P EST LOW 20 MIN: CPT

## 2025-05-15 RX ORDER — BLOOD SUGAR DIAGNOSTIC
STRIP MISCELLANEOUS 4 TIMES DAILY
Qty: 1 | Refills: 4 | Status: ACTIVE | COMMUNITY
Start: 2025-05-15 | End: 1900-01-01

## 2025-05-15 RX ORDER — LANCETS 28 GAUGE
EACH MISCELLANEOUS
Qty: 120 | Refills: 4 | Status: ACTIVE | COMMUNITY
Start: 2025-05-15 | End: 1900-01-01

## 2025-05-15 RX ORDER — 70%ISOPROPYL ALCOHOL 0.7 ML/ML
70 SWAB TOPICAL
Qty: 1 | Refills: 1 | Status: ACTIVE | COMMUNITY
Start: 2025-05-15 | End: 1900-01-01

## 2025-05-15 RX ORDER — BLOOD-GLUCOSE METER
KIT MISCELLANEOUS
Qty: 1 | Refills: 0 | Status: ACTIVE | COMMUNITY
Start: 2025-05-15 | End: 1900-01-01

## 2025-05-19 DIAGNOSIS — O09.899 SUPERVISION OF OTHER HIGH RISK PREGNANCIES, UNSPECIFIED TRIMESTER: ICD-10-CM

## 2025-05-19 DIAGNOSIS — N39.0 URINARY TRACT INFECTION, SITE NOT SPECIFIED: ICD-10-CM

## 2025-05-19 DIAGNOSIS — O09.529 SUPERVISION OF ELDERLY MULTIGRAVIDA, UNSPECIFIED TRIMESTER: ICD-10-CM

## 2025-05-19 LAB — BACTERIA UR CULT: ABNORMAL

## 2025-05-29 ENCOUNTER — APPOINTMENT (OUTPATIENT)
Dept: ANTEPARTUM | Facility: CLINIC | Age: 45
End: 2025-05-29
Payer: MEDICAID

## 2025-05-29 ENCOUNTER — ASOB RESULT (OUTPATIENT)
Age: 45
End: 2025-05-29

## 2025-05-29 ENCOUNTER — APPOINTMENT (OUTPATIENT)
Dept: OBGYN | Facility: CLINIC | Age: 45
End: 2025-05-29
Payer: MEDICAID

## 2025-05-29 ENCOUNTER — OUTPATIENT (OUTPATIENT)
Dept: OUTPATIENT SERVICES | Facility: HOSPITAL | Age: 45
LOS: 1 days | End: 2025-05-29
Payer: MEDICAID

## 2025-05-29 VITALS
RESPIRATION RATE: 18 BRPM | WEIGHT: 192 LBS | TEMPERATURE: 97.3 F | BODY MASS INDEX: 34.02 KG/M2 | HEIGHT: 63 IN | HEART RATE: 91 BPM | DIASTOLIC BLOOD PRESSURE: 69 MMHG | SYSTOLIC BLOOD PRESSURE: 105 MMHG | OXYGEN SATURATION: 97 %

## 2025-05-29 DIAGNOSIS — O24.410 GESTATIONAL DIABETES MELLITUS IN PREGNANCY, DIET CONTROLLED: ICD-10-CM

## 2025-05-29 DIAGNOSIS — O09.529 SUPERVISION OF ELDERLY MULTIGRAVIDA, UNSPECIFIED TRIMESTER: ICD-10-CM

## 2025-05-29 DIAGNOSIS — Z34.00 ENCOUNTER FOR SUPERVISION OF NORMAL FIRST PREGNANCY, UNSPECIFIED TRIMESTER: ICD-10-CM

## 2025-05-29 DIAGNOSIS — A49.9 URINARY TRACT INFECTION, SITE NOT SPECIFIED: ICD-10-CM

## 2025-05-29 DIAGNOSIS — Z98.890 OTHER SPECIFIED POSTPROCEDURAL STATES: Chronic | ICD-10-CM

## 2025-05-29 DIAGNOSIS — Z90.49 ACQUIRED ABSENCE OF OTHER SPECIFIED PARTS OF DIGESTIVE TRACT: Chronic | ICD-10-CM

## 2025-05-29 DIAGNOSIS — O09.299 SUPERVISION OF PREGNANCY WITH OTHER POOR REPRODUCTIVE OR OBSTETRIC HISTORY, UNSPECIFIED TRIMESTER: ICD-10-CM

## 2025-05-29 DIAGNOSIS — Z34.90 ENCOUNTER FOR SUPERVISION OF NORMAL PREGNANCY, UNSPECIFIED, UNSPECIFIED TRIMESTER: ICD-10-CM

## 2025-05-29 DIAGNOSIS — O09.899 SUPERVISION OF OTHER HIGH RISK PREGNANCIES, UNSPECIFIED TRIMESTER: ICD-10-CM

## 2025-05-29 DIAGNOSIS — N39.0 URINARY TRACT INFECTION, SITE NOT SPECIFIED: ICD-10-CM

## 2025-05-29 PROCEDURE — 76816 OB US FOLLOW-UP PER FETUS: CPT

## 2025-05-29 PROCEDURE — 87186 SC STD MICRODIL/AGAR DIL: CPT

## 2025-05-29 PROCEDURE — 99213 OFFICE O/P EST LOW 20 MIN: CPT

## 2025-05-29 PROCEDURE — 87086 URINE CULTURE/COLONY COUNT: CPT

## 2025-05-29 PROCEDURE — G0463: CPT

## 2025-05-29 PROCEDURE — 81003 URINALYSIS AUTO W/O SCOPE: CPT

## 2025-05-29 RX ORDER — CEFUROXIME AXETIL 500 MG/1
500 TABLET, FILM COATED ORAL
Qty: 14 | Refills: 0 | Status: ACTIVE | COMMUNITY
Start: 2025-05-29 | End: 1900-01-01

## 2025-06-02 LAB — BACTERIA UR CULT: ABNORMAL

## 2025-06-03 DIAGNOSIS — O09.529 SUPERVISION OF ELDERLY MULTIGRAVIDA, UNSPECIFIED TRIMESTER: ICD-10-CM

## 2025-06-03 DIAGNOSIS — O09.299 SUPERVISION OF PREGNANCY WITH OTHER POOR REPRODUCTIVE OR OBSTETRIC HISTORY, UNSPECIFIED TRIMESTER: ICD-10-CM

## 2025-06-03 DIAGNOSIS — N39.0 URINARY TRACT INFECTION, SITE NOT SPECIFIED: ICD-10-CM

## 2025-06-03 DIAGNOSIS — O09.899 SUPERVISION OF OTHER HIGH RISK PREGNANCIES, UNSPECIFIED TRIMESTER: ICD-10-CM

## 2025-06-03 DIAGNOSIS — O24.410 GESTATIONAL DIABETES MELLITUS IN PREGNANCY, DIET CONTROLLED: ICD-10-CM

## 2025-06-04 ENCOUNTER — APPOINTMENT (OUTPATIENT)
Dept: MATERNAL FETAL MEDICINE | Facility: CLINIC | Age: 45
End: 2025-06-04

## 2025-06-04 ENCOUNTER — NON-APPOINTMENT (OUTPATIENT)
Age: 45
End: 2025-06-04

## 2025-06-10 ENCOUNTER — APPOINTMENT (OUTPATIENT)
Dept: MATERNAL FETAL MEDICINE | Facility: CLINIC | Age: 45
End: 2025-06-10

## 2025-06-11 ENCOUNTER — RESULT REVIEW (OUTPATIENT)
Age: 45
End: 2025-06-11

## 2025-06-11 ENCOUNTER — NON-APPOINTMENT (OUTPATIENT)
Age: 45
End: 2025-06-11

## 2025-06-11 ENCOUNTER — INPATIENT (INPATIENT)
Facility: HOSPITAL | Age: 45
LOS: 0 days | Discharge: HOME | End: 2025-06-12
Attending: SPECIALIST | Admitting: SPECIALIST
Payer: MEDICAID

## 2025-06-11 ENCOUNTER — EMERGENCY (EMERGENCY)
Facility: HOSPITAL | Age: 45
LOS: 1 days | End: 2025-06-11
Admitting: EMERGENCY MEDICINE
Payer: MEDICAID

## 2025-06-11 ENCOUNTER — ASOB RESULT (OUTPATIENT)
Age: 45
End: 2025-06-11

## 2025-06-11 ENCOUNTER — APPOINTMENT (OUTPATIENT)
Dept: ANTEPARTUM | Facility: CLINIC | Age: 45
End: 2025-06-11
Payer: MEDICAID

## 2025-06-11 VITALS
WEIGHT: 190.04 LBS | DIASTOLIC BLOOD PRESSURE: 62 MMHG | RESPIRATION RATE: 19 BRPM | OXYGEN SATURATION: 99 % | HEART RATE: 95 BPM | SYSTOLIC BLOOD PRESSURE: 101 MMHG | TEMPERATURE: 98 F

## 2025-06-11 VITALS
SYSTOLIC BLOOD PRESSURE: 106 MMHG | HEART RATE: 71 BPM | TEMPERATURE: 98 F | DIASTOLIC BLOOD PRESSURE: 62 MMHG | OXYGEN SATURATION: 99 % | RESPIRATION RATE: 15 BRPM

## 2025-06-11 DIAGNOSIS — N39.0 URINARY TRACT INFECTION, SITE NOT SPECIFIED: ICD-10-CM

## 2025-06-11 DIAGNOSIS — Z98.890 OTHER SPECIFIED POSTPROCEDURAL STATES: Chronic | ICD-10-CM

## 2025-06-11 DIAGNOSIS — O60.03 PRETERM LABOR WITHOUT DELIVERY, THIRD TRIMESTER: ICD-10-CM

## 2025-06-11 DIAGNOSIS — O09.899 SUPERVISION OF OTHER HIGH RISK PREGNANCIES, UNSPECIFIED TRIMESTER: ICD-10-CM

## 2025-06-11 DIAGNOSIS — Z90.49 ACQUIRED ABSENCE OF OTHER SPECIFIED PARTS OF DIGESTIVE TRACT: Chronic | ICD-10-CM

## 2025-06-11 DIAGNOSIS — O26.899 OTHER SPECIFIED PREGNANCY RELATED CONDITIONS, UNSPECIFIED TRIMESTER: ICD-10-CM

## 2025-06-11 DIAGNOSIS — O24.419 GESTATIONAL DIABETES MELLITUS IN PREGNANCY, UNSPECIFIED CONTROL: ICD-10-CM

## 2025-06-11 LAB
ALBUMIN SERPL ELPH-MCNC: 3.6 G/DL — SIGNIFICANT CHANGE UP (ref 3.3–5)
ALP SERPL-CCNC: 138 U/L — HIGH (ref 40–120)
ALT FLD-CCNC: 10 U/L — SIGNIFICANT CHANGE UP (ref 4–33)
ANION GAP SERPL CALC-SCNC: 11 MMOL/L — SIGNIFICANT CHANGE UP (ref 7–14)
APPEARANCE UR: ABNORMAL
AST SERPL-CCNC: 15 U/L — SIGNIFICANT CHANGE UP (ref 4–32)
BACTERIA # UR AUTO: ABNORMAL /HPF
BASOPHILS # BLD AUTO: 0.02 K/UL — SIGNIFICANT CHANGE UP (ref 0–0.2)
BASOPHILS NFR BLD AUTO: 0.2 % — SIGNIFICANT CHANGE UP (ref 0–2)
BILIRUB SERPL-MCNC: <0.2 MG/DL — SIGNIFICANT CHANGE UP (ref 0.2–1.2)
BILIRUB UR-MCNC: NEGATIVE — SIGNIFICANT CHANGE UP
BLD GP AB SCN SERPL QL: NEGATIVE — SIGNIFICANT CHANGE UP
BUN SERPL-MCNC: 7 MG/DL — SIGNIFICANT CHANGE UP (ref 7–23)
CALCIUM SERPL-MCNC: 9.5 MG/DL — SIGNIFICANT CHANGE UP (ref 8.4–10.5)
CAST: 0 /LPF — SIGNIFICANT CHANGE UP (ref 0–4)
CHLORIDE SERPL-SCNC: 103 MMOL/L — SIGNIFICANT CHANGE UP (ref 98–107)
CO2 SERPL-SCNC: 22 MMOL/L — SIGNIFICANT CHANGE UP (ref 22–31)
COLOR SPEC: YELLOW — SIGNIFICANT CHANGE UP
CREAT SERPL-MCNC: 0.47 MG/DL — LOW (ref 0.5–1.3)
DIFF PNL FLD: NEGATIVE — SIGNIFICANT CHANGE UP
EGFR: 121 ML/MIN/1.73M2 — SIGNIFICANT CHANGE UP
EGFR: 121 ML/MIN/1.73M2 — SIGNIFICANT CHANGE UP
EOSINOPHIL # BLD AUTO: 0.13 K/UL — SIGNIFICANT CHANGE UP (ref 0–0.5)
EOSINOPHIL NFR BLD AUTO: 1.1 % — SIGNIFICANT CHANGE UP (ref 0–6)
GLUCOSE SERPL-MCNC: 86 MG/DL — SIGNIFICANT CHANGE UP (ref 70–99)
GLUCOSE UR QL: NEGATIVE MG/DL — SIGNIFICANT CHANGE UP
HCT VFR BLD CALC: 34.9 % — SIGNIFICANT CHANGE UP (ref 34.5–45)
HGB BLD-MCNC: 11.1 G/DL — LOW (ref 11.5–15.5)
IANC: 8.22 K/UL — HIGH (ref 1.8–7.4)
IMM GRANULOCYTES NFR BLD AUTO: 0.6 % — SIGNIFICANT CHANGE UP (ref 0–0.9)
KETONES UR QL: NEGATIVE MG/DL — SIGNIFICANT CHANGE UP
LEUKOCYTE ESTERASE UR-ACNC: ABNORMAL
LYMPHOCYTES # BLD AUTO: 2.47 K/UL — SIGNIFICANT CHANGE UP (ref 1–3.3)
LYMPHOCYTES # BLD AUTO: 21.3 % — SIGNIFICANT CHANGE UP (ref 13–44)
MCHC RBC-ENTMCNC: 29.6 PG — SIGNIFICANT CHANGE UP (ref 27–34)
MCHC RBC-ENTMCNC: 31.8 G/DL — LOW (ref 32–36)
MCV RBC AUTO: 93.1 FL — SIGNIFICANT CHANGE UP (ref 80–100)
MONOCYTES # BLD AUTO: 0.66 K/UL — SIGNIFICANT CHANGE UP (ref 0–0.9)
MONOCYTES NFR BLD AUTO: 5.7 % — SIGNIFICANT CHANGE UP (ref 2–14)
NEUTROPHILS # BLD AUTO: 8.22 K/UL — HIGH (ref 1.8–7.4)
NEUTROPHILS NFR BLD AUTO: 71.1 % — SIGNIFICANT CHANGE UP (ref 43–77)
NITRITE UR-MCNC: NEGATIVE — SIGNIFICANT CHANGE UP
NRBC # BLD AUTO: 0 K/UL — SIGNIFICANT CHANGE UP (ref 0–0)
NRBC # FLD: 0 K/UL — SIGNIFICANT CHANGE UP (ref 0–0)
NRBC BLD AUTO-RTO: 0 /100 WBCS — SIGNIFICANT CHANGE UP (ref 0–0)
PH UR: 6.5 — SIGNIFICANT CHANGE UP (ref 5–8)
PLATELET # BLD AUTO: 272 K/UL — SIGNIFICANT CHANGE UP (ref 150–400)
POTASSIUM SERPL-MCNC: 4.2 MMOL/L — SIGNIFICANT CHANGE UP (ref 3.5–5.3)
POTASSIUM SERPL-SCNC: 4.2 MMOL/L — SIGNIFICANT CHANGE UP (ref 3.5–5.3)
PROT SERPL-MCNC: 7 G/DL — SIGNIFICANT CHANGE UP (ref 6–8.3)
PROT UR-MCNC: SIGNIFICANT CHANGE UP MG/DL
RBC # BLD: 3.75 M/UL — LOW (ref 3.8–5.2)
RBC # FLD: 13.7 % — SIGNIFICANT CHANGE UP (ref 10.3–14.5)
RBC CASTS # UR COMP ASSIST: 0 /HPF — SIGNIFICANT CHANGE UP (ref 0–4)
REVIEW: SIGNIFICANT CHANGE UP
RH IG SCN BLD-IMP: POSITIVE — SIGNIFICANT CHANGE UP
SODIUM SERPL-SCNC: 136 MMOL/L — SIGNIFICANT CHANGE UP (ref 135–145)
SP GR SPEC: 1.02 — SIGNIFICANT CHANGE UP (ref 1–1.03)
SQUAMOUS # UR AUTO: 69 /HPF — HIGH (ref 0–5)
UROBILINOGEN FLD QL: 0.2 MG/DL — SIGNIFICANT CHANGE UP (ref 0.2–1)
WBC # BLD: 11.57 K/UL — HIGH (ref 3.8–10.5)
WBC # FLD AUTO: 11.57 K/UL — HIGH (ref 3.8–10.5)
WBC UR QL: 11 /HPF — HIGH (ref 0–5)

## 2025-06-11 PROCEDURE — 76815 OB US LIMITED FETUS(S): CPT | Mod: 26

## 2025-06-11 PROCEDURE — 76819 FETAL BIOPHYS PROFIL W/O NST: CPT | Mod: 26

## 2025-06-11 PROCEDURE — 76770 US EXAM ABDO BACK WALL COMP: CPT | Mod: 26

## 2025-06-11 PROCEDURE — L9996: CPT

## 2025-06-11 RX ORDER — AMPICILLIN SODIUM 1 G/1
1 INJECTION, POWDER, FOR SOLUTION INTRAMUSCULAR; INTRAVENOUS EVERY 4 HOURS
Refills: 0 | Status: DISCONTINUED | OUTPATIENT
Start: 2025-06-11 | End: 2025-06-12

## 2025-06-11 RX ORDER — NIFEDIPINE 30 MG
10 TABLET, EXTENDED RELEASE 24 HR ORAL EVERY 6 HOURS
Refills: 0 | Status: DISCONTINUED | OUTPATIENT
Start: 2025-06-11 | End: 2025-06-12

## 2025-06-11 RX ORDER — NIFEDIPINE 30 MG
10 TABLET, EXTENDED RELEASE 24 HR ORAL
Refills: 0 | Status: COMPLETED | OUTPATIENT
Start: 2025-06-11 | End: 2025-06-11

## 2025-06-11 RX ORDER — CEFUROXIME SODIUM 1.5 G
1 VIAL (EA) INJECTION
Refills: 0
Start: 2025-06-11

## 2025-06-11 RX ORDER — SODIUM CHLORIDE 9 G/1000ML
1000 INJECTION, SOLUTION INTRAVENOUS
Refills: 0 | Status: DISCONTINUED | OUTPATIENT
Start: 2025-06-11 | End: 2025-06-11

## 2025-06-11 RX ORDER — CEFUROXIME SODIUM 1.5 G
500 VIAL (EA) INJECTION EVERY 12 HOURS
Refills: 0 | Status: DISCONTINUED | OUTPATIENT
Start: 2025-06-11 | End: 2025-06-11

## 2025-06-11 RX ORDER — ACETAMINOPHEN 500 MG/5ML
975 LIQUID (ML) ORAL ONCE
Refills: 0 | Status: COMPLETED | OUTPATIENT
Start: 2025-06-11 | End: 2025-06-11

## 2025-06-11 RX ORDER — AMPICILLIN SODIUM 1 G/1
2 INJECTION, POWDER, FOR SOLUTION INTRAMUSCULAR; INTRAVENOUS ONCE
Refills: 0 | Status: COMPLETED | OUTPATIENT
Start: 2025-06-11 | End: 2025-06-11

## 2025-06-11 RX ORDER — PRENATAL 136/IRON/FOLIC ACID 27 MG-1 MG
1 TABLET ORAL DAILY
Refills: 0 | Status: DISCONTINUED | OUTPATIENT
Start: 2025-06-11 | End: 2025-06-12

## 2025-06-11 RX ORDER — SODIUM CHLORIDE 9 G/1000ML
1000 INJECTION, SOLUTION INTRAVENOUS
Refills: 0 | Status: DISCONTINUED | OUTPATIENT
Start: 2025-06-11 | End: 2025-06-12

## 2025-06-11 RX ADMIN — Medication 10 MILLIGRAM(S): at 20:30

## 2025-06-11 RX ADMIN — Medication 10 MILLIGRAM(S): at 21:29

## 2025-06-11 RX ADMIN — Medication 975 MILLIGRAM(S): at 23:30

## 2025-06-11 RX ADMIN — AMPICILLIN SODIUM 200 GRAM(S): 1 INJECTION, POWDER, FOR SOLUTION INTRAMUSCULAR; INTRAVENOUS at 20:05

## 2025-06-11 RX ADMIN — Medication 12 MILLIGRAM(S): at 20:05

## 2025-06-11 RX ADMIN — Medication 125 MILLILITER(S): at 23:14

## 2025-06-11 RX ADMIN — SODIUM CHLORIDE 125 MILLILITER(S): 9 INJECTION, SOLUTION INTRAVENOUS at 21:39

## 2025-06-11 RX ADMIN — Medication 10 MILLIGRAM(S): at 21:03

## 2025-06-11 RX ADMIN — Medication 975 MILLIGRAM(S): at 23:14

## 2025-06-11 RX ADMIN — AMPICILLIN SODIUM 100 GRAM(S): 1 INJECTION, POWDER, FOR SOLUTION INTRAMUSCULAR; INTRAVENOUS at 23:47

## 2025-06-11 NOTE — OB PROVIDER H&P - NSLOWPPHRISK_OBGYN_A_OB
No previous uterine incision/Sapp Pregnancy/No known bleeding disorder/No history of postpartum hemorrhage

## 2025-06-11 NOTE — OB PROVIDER TRIAGE NOTE - NS_OBGYNHISTORY_OBGYN_ALL_OB_FT
sab x1, d&c  01 FT    06 36 week - PTL  6/28/10 FT    14 FT     24wk IUFD   24 FT     AP course complicated by:  GDMA1- Failed 1 hour and than recorded fingersticks. Due to elevated numbers, diagnosed GDM.   Short Cervix- prescribed vaginal progesterone, patient self DC'd  2 Vessel Cord- Normal fetal Echo   Poor OB history- H/O 36 week NSD and 24 week IUFD  Urine cultures from [facility] positive for E Coli on , 5/15 and , sensitive to all abx sab x1, d&c  01 FT    06 36 week - PTL  6/28/10 FT    14 FT     24wk IUFD   24 FT     AP course complicated by:  GDMA1- Failed 1 hour and than recorded fingersticks. Due to elevated numbers, diagnosed GDM.   Short Cervix- prescribed vaginal progesterone, patient self DC'd after 2 doses.  2 Vessel Cord- Normal fetal Echo   Poor OB history- H/O 36 week NSD and 24 week IUFD  Urine cultures from [facility] positive for E Coli on , 5/15 and , sensitive to all abx

## 2025-06-11 NOTE — OB PROVIDER TRIAGE NOTE - HISTORY OF PRESENT ILLNESS
46yo  @ 32.2 presents for evaluation for c/o low back pain. Reports the pain comes and goes and started a few days ago. The pain radiates around to her lower abdomen. Reports feeling "hot" but has not taken her temp. Denies dysuria. Denies LOF, VB and reports GFM.   Currently being treated for UTI with Ceftin 500mg PO BID on day 5. Patient initially had UTI from . Was treated with Macrobid but was only taking it once a day so it did not treat it properly.   Gets care with [facility]  AP course complicated by:  GDMA1- Failed 1 hour and than recorded fingersticks. Due to elevated numbers, diagnosed GDM.   Short Cervix- prescribed vaginal progesterone, patient self DC'd  2 Vessel Cord- Normal fetal Echo   Poor OB history- H/O 36 week NSD and 24 week IUFD  Urine cultures from [facility] positive for E Coli on , 5/15 and , sensitive to all abx    H/O Anxiety self diagnosed   and  Hernia Repair  2019 Appendectomy  D&C 44yo  @ 32.2 presents for evaluation for c/o low back pain. Reports the pain comes and goes and started a few days ago. The pain radiates around to her lower abdomen. Reports feeling "hot" but has not taken her temp. Denies dysuria. Denies LOF, VB and reports GFM.   Currently being treated for UTI with Ceftin 500mg PO BID on day 5. Patient initially had UTI from . Was treated with Macrobid but was only taking it once a day so it did not treat it properly.   Gets care with [facility]  AP course complicated by:  GDMA1- Failed 1 hour and than recorded fingersticks. Due to elevated numbers, diagnosed GDM.   Short Cervix- prescribed vaginal progesterone, patient self DC'd after 2 doses  2 Vessel Cord- Normal fetal Echo   Poor OB history- H/O 36 week NSD and 24 week IUFD  Urine cultures from [facility] positive for E Coli on , 5/15 and , sensitive to all abx    H/O Anxiety self diagnosed   and  Hernia Repair  2019 Appendectomy  D&C

## 2025-06-11 NOTE — OB PROVIDER TRIAGE NOTE - NSHPPHYSICALEXAM_GEN_ALL_CORE
Assessment reveals VSS, abdomen soft, NT, gravid.  No CVA tenderness  SSE- cervix appears closed  VE 1.5/30/-3 - Chaperoned by DARNELL Peters RN  NST  Baseline  ( 130 ) BPM  Variability (x  )  Moderate   (  ) Minimal  (  ) Absent  (  )  Marked  Accelerations (x  ) 15x15   (  ) 10x10  (  ) no  Decelerations (x  ) no  (  ) Variable  (  ) Early  (  ) Late      Description _________  Contractions (x  ) no  (  ) yes     Description  __________  Interpretation ( x ) reactive   (  )  non-reactive  Cat 1 FHT    CBC, CMP, UA and Urine culture ordered  Renal US ordered Assessment reveals VSS, abdomen soft, NT, gravid.  No CVA tenderness  SSE- cervix appears closed  VE 1.5/30/-3 - Chaperoned by DARNELL Peters RN  NST  Baseline  ( 130 ) BPM  Variability (x  )  Moderate   (  ) Minimal  (  ) Absent  (  )  Marked  Accelerations (x  ) 15x15   (  ) 10x10  (  ) no  Decelerations (x  ) no  (  ) Variable  (  ) Early  (  ) Late      Description _________  Contractions (x  ) no  (  ) yes     Description  __________  Interpretation ( x ) reactive   (  )  non-reactive  Cat 1 FHT    CBC, CMP, UA and Urine culture ordered  Renal US ordered    Renal US reveals: Normal renal US  Incidental finding of echogenic liver suggesting fatty infiltration- patient made aware.     Labs WNL  Urine culture pending Assessment reveals VSS, abdomen soft, NT, gravid.  Well appearing in NAD  No CVA tenderness  SSE- cervix appears closed  VE 1.5/30/-3 - Chaperoned by DARNELL Peters RN  NST  Baseline  ( 130 ) BPM  Variability (x  )  Moderate   (  ) Minimal  (  ) Absent  (  )  Marked  Accelerations (x  ) 15x15   (  ) 10x10  (  ) no  Decelerations (x  ) no  (  ) Variable  (  ) Early  (  ) Late      Description _________  Contractions (x  ) no  (  ) yes     Description  __________  Interpretation ( x ) reactive   (  )  non-reactive  Cat 1 FHT    CBC, CMP, UA and Urine culture ordered  Renal US ordered    Renal US reveals: Normal renal US  Incidental finding of echogenic liver suggesting fatty infiltration- patient made aware.     Labs WNL  Urine culture pending    1640:   VE repeated- 2.5/30/-3  D/W Dr. Melara, for repeat exam in 2 hours. Assessment reveals VSS, abdomen soft, NT, gravid.  Well appearing in NAD  No CVA tenderness  SSE- cervix appears closed  VE 1.5/30/-3 - Chaperoned by DARNELL Peters RN  NST  Baseline  ( 130 ) BPM  Variability (x  )  Moderate   (  ) Minimal  (  ) Absent  (  )  Marked  Accelerations (x  ) 15x15   (  ) 10x10  (  ) no  Decelerations (x  ) no  (  ) Variable  (  ) Early  (  ) Late      Description _________  Contractions (x  ) no  (  ) yes     Description  __________  Interpretation ( x ) reactive   (  )  non-reactive  Cat 1 FHT    CBC, CMP, UA and Urine culture ordered  Renal US ordered    Renal US reveals: Normal renal US  Incidental finding of echogenic liver suggesting fatty infiltration- patient made aware.     Labs WNL  Urine culture pending    1640:   VE repeated- 2.5/30/-3  D/W Dr. Melara, for repeat exam in 2 hours.    1830:  No complaints, patient sleeping  NST  Baseline  (135 ) BPM  Variability (x  )  Moderate   (  ) Minimal  (  ) Absent  (  )  Marked  Accelerations ( x ) 15x15   (  ) 10x10  (  ) no  Decelerations ( x ) no  (  ) Variable  (  ) Early  (  ) Late      Description _________  Contractions (  ) no  (  ) yes  Irritability on toco  Interpretation ( x ) reactive   (  )  non-reactive  Patient re-examined- 4/70/-3  Cervical change noted  For admission for PTL  D/W Dr. Chau

## 2025-06-11 NOTE — OB PROVIDER TRIAGE NOTE - NS_DISPOSITION_OBGYN_ALL_OB
Left foot pain and swelling X months, states diagnosed w/ gout and medications not working, states got worse w/ increased pain and swelling and difficulty ambulating since yesterday, radiates to upper left leg. Continue to Observe Discharge Admit to Labor and Delivery

## 2025-06-11 NOTE — OB PROVIDER H&P - HISTORY OF PRESENT ILLNESS
46yo  @ 32.2 presents for evaluation for c/o low back pain. Reports the pain comes and goes and started a few days ago. The pain radiates around to her lower abdomen. Reports feeling "hot" but has not taken her temp. Denies dysuria. Denies LOF, VB and reports GFM.   Currently being treated for UTI with Ceftin 500mg PO BID on day 5. Patient initially had UTI from . Was treated with Macrobid but was only taking it once a day so it did not treat it properly.   Gets care with [facility]  AP course complicated by:  GDMA1- Failed 1 hour and than recorded fingersticks. Due to elevated numbers, diagnosed GDM.   Short Cervix- prescribed vaginal progesterone, patient self DC'd after 2 doses  2 Vessel Cord- Normal fetal Echo   Poor OB history- H/O 36 week NSD and 24 week IUFD  Urine cultures from [facility] positive for E Coli on , 5/15 and , sensitivities performed     H/O Anxiety self diagnosed   and  Hernia Repair  2019 Appendectomy  D&C

## 2025-06-11 NOTE — OB PROVIDER H&P - NSHPPHYSICALEXAM_GEN_ALL_CORE
Assessment reveals VSS, abdomen soft, NT, gravid.  Well appearing in NAD  No CVA tenderness  SSE- cervix appears closed  VE 1.5/30/-3 - Chaperoned by DARNELL Peters RN  NST  Baseline  ( 130 ) BPM  Variability (x  )  Moderate   (  ) Minimal  (  ) Absent  (  )  Marked  Accelerations (x  ) 15x15   (  ) 10x10  (  ) no  Decelerations (x  ) no  (  ) Variable  (  ) Early  (  ) Late      Description _________  Contractions (x  ) no  (  ) yes     Description  __________  Interpretation ( x ) reactive   (  )  non-reactive  Cat 1 FHT    CBC, CMP, UA and Urine culture ordered  Renal US ordered    Renal US reveals: Normal renal US  Incidental finding of echogenic liver suggesting fatty infiltration- patient made aware.     Labs WNL  Urine culture pending    1640:   VE repeated- 2.5/30/-3  D/W Dr. Melara, for repeat exam in 2 hours.    1830:  No complaints, patient sleeping  NST  Baseline  (135 ) BPM  Variability (x  )  Moderate   (  ) Minimal  (  ) Absent  (  )  Marked  Accelerations ( x ) 15x15   (  ) 10x10  (  ) no  Decelerations ( x ) no  (  ) Variable  (  ) Early  (  ) Late      Description _________  Contractions (  ) no  (  ) yes  Irritability on toco  Interpretation ( x ) reactive   (  )  non-reactive  Patient re-examined- 4/70/-3  Cervical change noted  For admission for PTL  D/W Dr. Chau

## 2025-06-11 NOTE — OB RN PATIENT PROFILE - SUICIDE SCREENING DEPRESSION
Allergy shots given subcu right upper arm and left upper arm.  No adverse reaction noted.  
Negative

## 2025-06-11 NOTE — OB PROVIDER TRIAGE NOTE - IN ACCORDANCE WITH [place] STATE LAW, WE OFFER EVERY PATIENT A HEPATITIS C TEST. WOULD YOU LIKE TO BE TESTED TODAY?
PT ADVISED SURGERY MOVED 6 WEEKS OUT PER CARDIOLOGY.  PT GIVEN NEW DATES    SURGERY - 9/26/23 @ 730  PO- 10/9/23 @ 919 Previously negative

## 2025-06-11 NOTE — OB RN TRIAGE NOTE - CURRENT PREGNANCY COMPLICATIONS, OB PROFILE
currently being tx for UTI x5days/Other currently being tx for UTI x5days; short cervix; gdma1; 2 vessel cord/Gestational Diabetes/Incompetent Cervix/Cervical Insufficiency/Gestational Age less than 36 Weeks/Other

## 2025-06-11 NOTE — OB PROVIDER H&P - NS_OBGYNHISTORY_OBGYN_ALL_OB_FT
sab x1, d&c  01 FT    06 36 week - PTL  6/28/10 FT    14 FT     24wk IUFD   24 FT     AP course complicated by:  GDMA1- Failed 1 hour and than recorded fingersticks. Due to elevated numbers, diagnosed GDM.   Short Cervix- prescribed vaginal progesterone, patient self DC'd after 2 doses.  2 Vessel Cord- Normal fetal Echo   Poor OB history- H/O 36 week NSD and 24 week IUFD  Urine cultures from [facility]IE positive for E Coli on , 5/15 and , sensitivities performed

## 2025-06-11 NOTE — OB PROVIDER TRIAGE NOTE - NSHPLABSRESULTS_GEN_ALL_CORE
11.1   57 )-----------( 272      ( 2025 13:55 )             34.9       136  |  103  |  7   ----------------------------<  86  4.2   |  22  |  0.47[L]    Ca    9.5      2025 13:55    TPro  7.0  /  Alb  3.6  /  TBili  <0.2  /  DBili  x   /  AST  15  /  ALT  10  /  AlkPhos  138[H]    Urinalysis Basic - ( 2025 13:55 )    Color: Yellow / Appearance: Cloudy / S.016 / pH: x  Gluc: 86 mg/dL / Ketone: x  / Bili: Negative / Urobili: 0.2 mg/dL   Blood: x / Protein: Trace mg/dL / Nitrite: Negative   Leuk Esterase: Large / RBC: 0 /HPF / WBC 11 /HPF   Sq Epi: x / Non Sq Epi: 69 /HPF / Bacteria: Many /HPF

## 2025-06-11 NOTE — OB PROVIDER H&P - ASSESSMENT
46yo  @ 32.2 present admission for PTL  Routine Admission Labs       CBC       Type and screen       RPR  GBS culture sent  Gonorrhea/Chlamydia/Trich in Urine  GBS prophylaxis- Ampicillin  Betamethasone for fetal lung maturity  Procardia 10IR q20 min x 3 doses for tocolysis    46yo  @ 32.2 present admission for PTL  Routine Admission Labs       CBC       Type and screen       RPR  GBS culture sent  Gonorrhea/Chlamydia/Trich in Urine  GBS prophylaxis- Ampicillin  Betamethasone for fetal lung maturity  Procardia 10IR q20 min x 3 doses for tocolysis  Ceftin 500mg PO BID x 2 days to complete regimen for UTI  Cross match x 2 units for grandmultiparity   IVLR @ 125cc/hr for FS > 100  IVLR @ 125cc/hr for FS < 100  Finger stick every  4 hours  Diabetic clears diet  Pain management PRN  D/W Dr. Chau    Risks, benefits, alternatives, and possible complications have been discussed in detail with the patient in her native language. Pre-admission, admission, and post admission procedures and expectations were discussed in detail. All questions answered, all appropriate hospital consents were signed. Anticipate normal vaginal delivery.    Informed consent was obtained. The following was discussed:    - Induction/augmentation of labor: use of medication and/or cook balloon to begin or enhance labor    - Obstetrical management including internal fetal/contraction monitoring    - Normal vaginal delivery    - Possible  section              46yo  @ 32.2 present admission for PTL  Routine Admission Labs       CBC       Type and screen       RPR  GBS culture sent  Gonorrhea/Chlamydia/Trich in Urine  GBS prophylaxis- Ampicillin  Betamethasone for fetal lung maturity  Procardia 10IR q20 min x 3 doses for tocolysis  Ceftin 500mg PO BID x 2 days to complete regimen for UTI  Cross match x 2 units for grandmultiparity   IVLR @ 125cc/hr for FS > 100  IVLR @ 125cc/hr for FS < 100  Finger stick every  4 hours  Diabetic clears diet  Pain management PRN  D/W MFM and Dr. Chau (PGY3)    Risks, benefits, alternatives, and possible complications have been discussed in detail with the patient in her native language. Pre-admission, admission, and post admission procedures and expectations were discussed in detail. All questions answered, all appropriate hospital consents were signed. Anticipate normal vaginal delivery.    Informed consent was obtained. The following was discussed:    - Induction/augmentation of labor: use of medication and/or cook balloon to begin or enhance labor    - Obstetrical management including internal fetal/contraction monitoring    - Normal vaginal delivery    - Possible  section        OB Attending  Agree with above  44 y/o  EGA 32+ weeks with PTL  VSS, afebrile  FHT - reactive    Will:    1) Admit to L and D  2) Procardia/beta/amp as per MFM    Will follow    MONTANA Bojorquez

## 2025-06-11 NOTE — OB RN PATIENT PROFILE - PRO FIRST TIME PARENT YN OB
Patient notified of BMP results and verbalized understanding.     Patient inquiring if she needs to continue taking potassium since it was normal. Writer informed that since she is taking Bumex she should continue. Patient became upset and states that at appointment yesterday with Dr Javier she told him she is NOT taking that due to incontinence. She instead takes   Furosemide 20 mg every couple of days. Patient was not able to state  Why she takes it occasionally. States she is always swollen and avoids salty foods. Will be coming in next week for lymphedema pump trial.    Patient is also asking if she should take Airbourne as she feels she is low on zinc, \"What were my zinc levels in the labs?\" Writer informed that was not checked and patient became frustrated.       Dr Javier please advise on Bumex/Furosemide as well as Airbourne.    no

## 2025-06-11 NOTE — OB PROVIDER TRIAGE NOTE - ADDITIONAL INSTRUCTIONS
Follow up as scheduled  Call MD with worsening of symptoms  Call triage desk in 48 hours for results of urine culture

## 2025-06-11 NOTE — OB RN TRIAGE NOTE - NS_OBGYNHISTORY_OBGYN_ALL_OB_FT
sab x1, d&c  4/19/01 nsd m 7#5  8/9/06 nsd f 5#14-btw 32-34wks, ptl/pprom  6/28/10 nsd m   11/30/14 nsd m   2/14/24 nsd sab x1, d&c  01 nsd m 7#5  06 nsd f 5#14-36wks, ptl/pprom  6/28/10 nsd m   14 nsd m   202 24wk IUFD   24 nsd

## 2025-06-11 NOTE — OB RN PATIENT PROFILE - URINARY CATHETER

## 2025-06-11 NOTE — OB PROVIDER TRIAGE NOTE - CURRENT PREGNANCY COMPLICATIONS, OB PROFILE
GDMA1/2 vessel cord/Gestational Diabetes/Incompetent Cervix/Cervical Insufficiency/Gestational Age less than 36 Weeks/Maternal Unknown GBS/Other

## 2025-06-12 ENCOUNTER — APPOINTMENT (OUTPATIENT)
Dept: ANTEPARTUM | Facility: CLINIC | Age: 45
End: 2025-06-12
Payer: MEDICAID

## 2025-06-12 ENCOUNTER — ASOB RESULT (OUTPATIENT)
Age: 45
End: 2025-06-12

## 2025-06-12 VITALS
TEMPERATURE: 98 F | DIASTOLIC BLOOD PRESSURE: 71 MMHG | RESPIRATION RATE: 16 BRPM | SYSTOLIC BLOOD PRESSURE: 118 MMHG | HEART RATE: 88 BPM

## 2025-06-12 LAB — T PALLIDUM AB TITR SER: NEGATIVE — SIGNIFICANT CHANGE UP

## 2025-06-12 PROCEDURE — 99254 IP/OBS CNSLTJ NEW/EST MOD 60: CPT | Mod: GC

## 2025-06-12 PROCEDURE — 76819 FETAL BIOPHYS PROFIL W/O NST: CPT | Mod: 26

## 2025-06-12 RX ORDER — CEPHALEXIN 250 MG/1
500 CAPSULE ORAL EVERY 6 HOURS
Refills: 0 | Status: DISCONTINUED | OUTPATIENT
Start: 2025-06-12 | End: 2025-06-12

## 2025-06-12 RX ORDER — DIPHENHYDRAMINE HCL 12.5MG/5ML
25 ELIXIR ORAL ONCE
Refills: 0 | Status: COMPLETED | OUTPATIENT
Start: 2025-06-12 | End: 2025-06-12

## 2025-06-12 RX ORDER — METOCLOPRAMIDE HCL 10 MG
10 TABLET ORAL ONCE
Refills: 0 | Status: COMPLETED | OUTPATIENT
Start: 2025-06-12 | End: 2025-06-12

## 2025-06-12 RX ORDER — ACETAMINOPHEN 500 MG/5ML
1000 LIQUID (ML) ORAL ONCE
Refills: 0 | Status: COMPLETED | OUTPATIENT
Start: 2025-06-12 | End: 2025-06-12

## 2025-06-12 RX ORDER — CEFUROXIME SODIUM 1.5 G
1 VIAL (EA) INJECTION
Refills: 0 | DISCHARGE

## 2025-06-12 RX ADMIN — Medication 10 MILLIGRAM(S): at 08:01

## 2025-06-12 RX ADMIN — Medication 1000 MILLIGRAM(S): at 16:00

## 2025-06-12 RX ADMIN — AMPICILLIN SODIUM 100 GRAM(S): 1 INJECTION, POWDER, FOR SOLUTION INTRAMUSCULAR; INTRAVENOUS at 08:01

## 2025-06-12 RX ADMIN — Medication 400 MILLIGRAM(S): at 15:28

## 2025-06-12 RX ADMIN — Medication 25 MILLIGRAM(S): at 15:26

## 2025-06-12 RX ADMIN — Medication 10 MILLIGRAM(S): at 15:25

## 2025-06-12 RX ADMIN — Medication 10 MILLIGRAM(S): at 02:35

## 2025-06-12 RX ADMIN — CEPHALEXIN 500 MILLIGRAM(S): 250 CAPSULE ORAL at 15:24

## 2025-06-12 RX ADMIN — Medication 12 MILLIGRAM(S): at 20:01

## 2025-06-12 RX ADMIN — AMPICILLIN SODIUM 100 GRAM(S): 1 INJECTION, POWDER, FOR SOLUTION INTRAMUSCULAR; INTRAVENOUS at 03:58

## 2025-06-12 RX ADMIN — CEPHALEXIN 500 MILLIGRAM(S): 250 CAPSULE ORAL at 20:55

## 2025-06-12 RX ADMIN — Medication 1 TABLET(S): at 15:24

## 2025-06-12 NOTE — PROGRESS NOTE ADULT - ASSESSMENT
46yo  @32w3d admitted for  labor. Patient made change from 1.5/30/-3 on initial evaluation to 4/70/-3 in 5 hours. Patient is clinically stable, receiving  corticosteroids and tocolysis.    #PTL  - BMZ for fetal lung maturity (-)  - Ampicillin for GBS ppx (-)  - Procardia for tocolysis (-)  - Rare contractions on TOCO overnight    #A1  - NPO/rotating fluids  - FS q4h    #Fetal wellbeing  - Continuous monitoring  - f/u GBS ()    #Maternal wellbeing  - NPO/rotating fluids  - T&S q3d    Elvira Chau, PGY3

## 2025-06-12 NOTE — DISCHARGE NOTE ANTEPARTUM - FINANCIAL ASSISTANCE
[facility] provides services at a reduced cost to those who are determined to be eligible through [facility]’s financial assistance program. Information regarding [facility]’s financial assistance program can be found by going to [email or web address] or by calling [phone].

## 2025-06-12 NOTE — DISCHARGE NOTE ANTEPARTUM - HOSPITAL COURSE
44yo  admitted @32w2d for  labor. Patient made change from 1.5/30/-3 on initial evaluation to 4/70/-3 in 5 hours. She received betamethasone, ampicillin, and Procardia for tocolysis. On day of discharge patient without contractions, fetal status reassuring, and cervical exam unchanged.  labor precautions given. Patient to follow up with [facility] in one week. Patient was being treated for UTI prior to presentation. She completed 7 day course of antibiotics in the hospital, was on ampicillin and Keflex in house

## 2025-06-12 NOTE — PROGRESS NOTE ADULT - ATTENDING COMMENTS
Patient admitted with concern for PTL due to cervical change although no documented uterine activity.  This AM, patient reports feeling well and denies complaints.    Plan to d/c Procardia.  Continue BMZ course.  If remains stable and vaginal exam unchanged can d/c this PM after second BMZ course.

## 2025-06-12 NOTE — DISCHARGE NOTE ANTEPARTUM - NS MD DC FALL RISK RISK
For information on Fall & Injury Prevention, visit: [email or web address] OR  [email or web address] OR  https://www.cdc.gov/steadi/patient.html

## 2025-06-12 NOTE — PROGRESS NOTE ADULT - SUBJECTIVE AND OBJECTIVE BOX
R3 Antepartum Note    Patient seen and examined at bedside, no acute overnight events. No acute complaints. Pt reports +FM, denies LOF, VB, ctx, HA, epigastric pain, blurred vision, CP, SOB, N/V, fevers, and chills.    Vital Signs Last 24 Hours  T(C): 36.7 (06-12-25 @ 03:00), Max: 36.9 (06-11-25 @ 12:26)  HR: 93 (06-12-25 @ 01:15) (71 - 100)  BP: 101/58 (06-12-25 @ 01:15) (90/51 - 125/58)  RR: 16 (06-12-25 @ 03:00) (15 - 19)  SpO2: 99% (06-11-25 @ 12:52) (99% - 99%)    CAPILLARY BLOOD GLUCOSE      POCT Blood Glucose.: 131 mg/dL (12 Jun 2025 02:57)  POCT Blood Glucose.: 122 mg/dL (11 Jun 2025 23:12)  POCT Blood Glucose.: 77 mg/dL (11 Jun 2025 19:22)      Physical Exam:  General: NAD  Abdomen: Soft, non-tender, gravid  Ext: No pain or swelling    NST reactive overnight, rare contractions on TOCO    Labs:             11.1   11.57 )-----------( 272      ( 06-11 @ 13:55 )             34.9     06-11 @ 13:55    136  |  103  |  7   ----------------------------<  86  4.2   |  22  |  0.47    Ca    9.5      06-11 @ 13:55    TPro  7.0  /  Alb  3.6  /  TBili  <0.2  /  DBili  x   /  AST  15  /  ALT  10  /  AlkPhos  138  06-11 @ 13:55            MEDICATIONS  (STANDING):  ampicillin  IVPB 1 Gram(s) IV Intermittent every 4 hours  betamethasone Injectable 12 milliGRAM(s) IntraMuscular every 24 hours  dextrose 5% + sodium chloride 0.9%. 1000 milliLiter(s) (125 mL/Hr) IV Continuous <Continuous>  NIFEdipine IR 10 milliGRAM(s) Oral every 6 hours  prenatal multivitamin 1 Tablet(s) Oral daily  sodium chloride 0.9%. 1000 milliLiter(s) (125 mL/Hr) IV Continuous <Continuous>    MEDICATIONS  (PRN):   R3 Antepartum Note    Patient seen and examined at bedside. Patient felt mild contractions at 1am that resolved after receiving procardia. Pt reports +FM, denies LOF, VB, ctx, HA, epigastric pain, blurred vision, CP, SOB, N/V, fevers, and chills.    Vital Signs Last 24 Hours  T(C): 36.7 (06-12-25 @ 03:00), Max: 36.9 (06-11-25 @ 12:26)  HR: 93 (06-12-25 @ 01:15) (71 - 100)  BP: 101/58 (06-12-25 @ 01:15) (90/51 - 125/58)  RR: 16 (06-12-25 @ 03:00) (15 - 19)  SpO2: 99% (06-11-25 @ 12:52) (99% - 99%)    CAPILLARY BLOOD GLUCOSE      POCT Blood Glucose.: 131 mg/dL (12 Jun 2025 02:57)  POCT Blood Glucose.: 122 mg/dL (11 Jun 2025 23:12)  POCT Blood Glucose.: 77 mg/dL (11 Jun 2025 19:22)      Physical Exam:  General: NAD  Abdomen: Soft, non-tender, gravid  Ext: No pain or swelling    NST reactive overnight, rare contractions on TOCO    Labs:             11.1   11.57 )-----------( 272      ( 06-11 @ 13:55 )             34.9     06-11 @ 13:55    136  |  103  |  7   ----------------------------<  86  4.2   |  22  |  0.47    Ca    9.5      06-11 @ 13:55    TPro  7.0  /  Alb  3.6  /  TBili  <0.2  /  DBili  x   /  AST  15  /  ALT  10  /  AlkPhos  138  06-11 @ 13:55            MEDICATIONS  (STANDING):  ampicillin  IVPB 1 Gram(s) IV Intermittent every 4 hours  betamethasone Injectable 12 milliGRAM(s) IntraMuscular every 24 hours  dextrose 5% + sodium chloride 0.9%. 1000 milliLiter(s) (125 mL/Hr) IV Continuous <Continuous>  NIFEdipine IR 10 milliGRAM(s) Oral every 6 hours  prenatal multivitamin 1 Tablet(s) Oral daily  sodium chloride 0.9%. 1000 milliLiter(s) (125 mL/Hr) IV Continuous <Continuous>    MEDICATIONS  (PRN):   R3 Antepartum Note    Patient seen and examined at bedside. Patient felt mild contractions at 1am that resolved after receiving procardia. Pt reports +FM, denies LOF, VB, ctx, HA, epigastric pain, blurred vision, CP, SOB, N/V, fevers, and chills.    Vital Signs Last 24 Hours  T(C): 36.7 (06-12-25 @ 03:00), Max: 36.9 (06-11-25 @ 12:26)  HR: 93 (06-12-25 @ 01:15) (71 - 100)  BP: 101/58 (06-12-25 @ 01:15) (90/51 - 125/58)  RR: 16 (06-12-25 @ 03:00) (15 - 19)  SpO2: 99% (06-11-25 @ 12:52) (99% - 99%)    CAPILLARY BLOOD GLUCOSE  POCT Blood Glucose.: 131 mg/dL (12 Jun 2025 02:57)  POCT Blood Glucose.: 122 mg/dL (11 Jun 2025 23:12)  POCT Blood Glucose.: 77 mg/dL (11 Jun 2025 19:22)      Physical Exam:  General: NAD  Abdomen: Soft, non-tender, gravid  Ext: No pain or swelling    NST reactive overnight, rare contractions on TOCO    Labs:             11.1   11.57 )-----------( 272      ( 06-11 @ 13:55 )             34.9     06-11 @ 13:55    136  |  103  |  7   ----------------------------<  86  4.2   |  22  |  0.47    Ca    9.5      06-11 @ 13:55    TPro  7.0  /  Alb  3.6  /  TBili  <0.2  /  DBili  x   /  AST  15  /  ALT  10  /  AlkPhos  138  06-11 @ 13:55            MEDICATIONS  (STANDING):  ampicillin  IVPB 1 Gram(s) IV Intermittent every 4 hours  betamethasone Injectable 12 milliGRAM(s) IntraMuscular every 24 hours  dextrose 5% + sodium chloride 0.9%. 1000 milliLiter(s) (125 mL/Hr) IV Continuous <Continuous>  NIFEdipine IR 10 milliGRAM(s) Oral every 6 hours  prenatal multivitamin 1 Tablet(s) Oral daily  sodium chloride 0.9%. 1000 milliLiter(s) (125 mL/Hr) IV Continuous <Continuous>    MEDICATIONS  (PRN):

## 2025-06-12 NOTE — PROVIDER CONTACT NOTE (OTHER) - RECOMMENDATIONS
evaluation by provider Prednisone Counseling:  I discussed with the patient the risks of prolonged use of prednisone including but not limited to weight gain, insomnia, osteoporosis, mood changes, diabetes, susceptibility to infection, glaucoma and high blood pressure.  In cases where prednisone use is prolonged, patients should be monitored with blood pressure checks, serum glucose levels and an eye exam.  Additionally, the patient may need to be placed on GI prophylaxis, PCP prophylaxis, and calcium and vitamin D supplementation and/or a bisphosphonate.  The patient verbalized understanding of the proper use and the possible adverse effects of prednisone.  All of the patient's questions and concerns were addressed.
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
## 2025-06-12 NOTE — DISCHARGE NOTE ANTEPARTUM - PLAN OF CARE
Patient admitted for  labor. Betamethasone, ampicillin, and Procardia were administered. Patient with no cervical change on day of discharge

## 2025-06-12 NOTE — DISCHARGE NOTE ANTEPARTUM - CARE PROVIDER_API CALL
Brittny Díaz  Midwifery  [address] [place] [address]  Phone: [phone]  Fax: [phone]  Follow Up Time:

## 2025-06-12 NOTE — DISCHARGE NOTE ANTEPARTUM - MEDICATION SUMMARY - MEDICATIONS TO STOP TAKING
I will STOP taking the medications listed below when I get home from the hospital:    Ceftin 500 mg oral tablet  -- 1 tab(s) by mouth 2 times a day    antibiotics  -- for uti

## 2025-06-12 NOTE — CHART NOTE - NSCHARTNOTEFT_GEN_A_CORE
Plan of Care Update    In brief,   46yo  @ 32w2d who had presented with intermittent lower back pain. Initial exam reported to be 1.5/30/-3. UA with large leuks but patient is being treated with Cefuroxime (day 5 of 7). No leukocytosis was noted and renal sono without any focal findings. Initial evaluation by SARBJIT Santa in triage with low suspicion for PTL given the above exam, comfortable appearing patient, and no regular contractions on the toco    Repeat VE by above reported to be 2 to 2.5cm    Plan:  - To be placed back on toco  - Repeat VE in x2hrs    Christos Melara, PGY-3  Obstetrics & Gynecology     d/w Dr. MURIEL Raymond, obgyn service attendings
Patient evaluated at bedside to assess for discharge. Patient denies contractions at this time. Reports mild lower back pain. Denies vaginal bleeding, LOF. +FM.     VE: /-3    NST: baseline 140, moderate variability, + accelerations, - decelerations  TOCO: no contractions    Plan  - Discussed at [facility] safety rounds. Patient cleared for discharge.  - Patient reports that she would have finished her 7 day course of Cefitin for UTI today. Patient got coverage for UTI in house with ampicillin and Keflex. No need to continue antibiotics at home as 7 day course is complete.  -  labor precautions given  - Patient to follow up with [facility] in one week    d/w Dr. Arroyo, Dr. Guilherme Chau, PGY3

## 2025-06-12 NOTE — PROVIDER CONTACT NOTE (OTHER) - ACTION/TREATMENT ORDERED:
MD Melara made aware, as per Md Melara patient to receive tylenol, benadyrl and reglan, see orders and mar.

## 2025-06-12 NOTE — DISCHARGE NOTE ANTEPARTUM - CARE PLAN
1 Principal Discharge DX:	 labor  Assessment and plan of treatment:	Patient admitted for  labor. Betamethasone, ampicillin, and Procardia were administered. Patient with no cervical change on day of discharge  Secondary Diagnosis:	Gestational diabetes  Secondary Diagnosis:	Single umbilical artery, maternal, antepartum  Secondary Diagnosis:	Short cervix

## 2025-06-12 NOTE — PROGRESS NOTE ADULT - TIME BILLING
This is a high complexity pt with maternal/fetal findings that increase complexity of medical decision making. The time was spent counseling, educating, reviewing previous notes and tests, coordinating care and documenting. I agreed with the history, physical exam and plan as documented by NP/resident/fellow.  This excludes teaching time.

## 2025-06-12 NOTE — DISCHARGE NOTE ANTEPARTUM - PATIENT PORTAL LINK FT
You can access the FollowMyHealth Patient Portal offered by [facility] by registering at the following website: [email or web address]. By joining [facility]’s FollowMyHealth portal, you will also be able to view your health information using other applications (apps) compatible with our system.

## 2025-06-13 LAB
C TRACH RRNA SPEC QL NAA+PROBE: SIGNIFICANT CHANGE UP
N GONORRHOEA RRNA SPEC QL NAA+PROBE: SIGNIFICANT CHANGE UP
SPECIMEN SOURCE: SIGNIFICANT CHANGE UP
T VAGINALIS RRNA SPEC QL NAA+PROBE: SIGNIFICANT CHANGE UP

## 2025-06-14 LAB
CULTURE RESULTS: SIGNIFICANT CHANGE UP
SPECIMEN SOURCE: SIGNIFICANT CHANGE UP

## 2025-06-17 ENCOUNTER — APPOINTMENT (OUTPATIENT)
Dept: OBGYN | Facility: CLINIC | Age: 45
End: 2025-06-17
Payer: MEDICAID

## 2025-06-17 ENCOUNTER — OUTPATIENT (OUTPATIENT)
Dept: OUTPATIENT SERVICES | Facility: HOSPITAL | Age: 45
LOS: 1 days | End: 2025-06-17
Payer: MEDICAID

## 2025-06-17 VITALS
BODY MASS INDEX: 34.02 KG/M2 | RESPIRATION RATE: 16 BRPM | HEIGHT: 63 IN | WEIGHT: 192 LBS | HEART RATE: 93 BPM | OXYGEN SATURATION: 97 % | SYSTOLIC BLOOD PRESSURE: 95 MMHG | DIASTOLIC BLOOD PRESSURE: 62 MMHG | TEMPERATURE: 97 F

## 2025-06-17 DIAGNOSIS — Z00.00 ENCOUNTER FOR GENERAL ADULT MEDICAL EXAMINATION WITHOUT ABNORMAL FINDINGS: ICD-10-CM

## 2025-06-17 DIAGNOSIS — Z90.49 ACQUIRED ABSENCE OF OTHER SPECIFIED PARTS OF DIGESTIVE TRACT: Chronic | ICD-10-CM

## 2025-06-17 DIAGNOSIS — Z98.890 OTHER SPECIFIED POSTPROCEDURAL STATES: Chronic | ICD-10-CM

## 2025-06-17 PROBLEM — O34.30 PREMATURE DILATION OF CERVIX DURING PREGNANCY, ANTEPARTUM: Status: ACTIVE | Noted: 2025-06-17

## 2025-06-17 PROCEDURE — 87086 URINE CULTURE/COLONY COUNT: CPT

## 2025-06-17 PROCEDURE — G0463: CPT

## 2025-06-17 PROCEDURE — 99213 OFFICE O/P EST LOW 20 MIN: CPT

## 2025-06-17 PROCEDURE — 81003 URINALYSIS AUTO W/O SCOPE: CPT

## 2025-06-18 LAB
-  AMIKACIN: SIGNIFICANT CHANGE UP
-  AMPICILLIN: SIGNIFICANT CHANGE UP
-  AZTREONAM: SIGNIFICANT CHANGE UP
-  CEFEPIME: SIGNIFICANT CHANGE UP
-  CEFTAZIDIME: SIGNIFICANT CHANGE UP
-  CIPROFLOXACIN: SIGNIFICANT CHANGE UP
-  CIPROFLOXACIN: SIGNIFICANT CHANGE UP
-  IMIPENEM: SIGNIFICANT CHANGE UP
-  LEVOFLOXACIN: SIGNIFICANT CHANGE UP
-  LEVOFLOXACIN: SIGNIFICANT CHANGE UP
-  MEROPENEM: SIGNIFICANT CHANGE UP
-  NITROFURANTOIN: SIGNIFICANT CHANGE UP
-  PIPERACILLIN/TAZOBACTAM: SIGNIFICANT CHANGE UP
-  TETRACYCLINE: SIGNIFICANT CHANGE UP
-  VANCOMYCIN: SIGNIFICANT CHANGE UP
CULTURE RESULTS: ABNORMAL
METHOD TYPE: SIGNIFICANT CHANGE UP
METHOD TYPE: SIGNIFICANT CHANGE UP
ORGANISM # SPEC MICROSCOPIC CNT: ABNORMAL
SPECIMEN SOURCE: SIGNIFICANT CHANGE UP

## 2025-06-19 LAB — BACTERIA UR CULT: NORMAL

## 2025-06-23 DIAGNOSIS — O24.419 GESTATIONAL DIABETES MELLITUS IN PREGNANCY, UNSPECIFIED CONTROL: ICD-10-CM

## 2025-06-23 DIAGNOSIS — O09.899 SUPERVISION OF OTHER HIGH RISK PREGNANCIES, UNSPECIFIED TRIMESTER: ICD-10-CM

## 2025-06-23 DIAGNOSIS — O34.30 MATERNAL CARE FOR CERVICAL INCOMPETENCE, UNSPECIFIED TRIMESTER: ICD-10-CM

## 2025-06-23 DIAGNOSIS — O09.529 SUPERVISION OF ELDERLY MULTIGRAVIDA, UNSPECIFIED TRIMESTER: ICD-10-CM

## 2025-06-26 ENCOUNTER — APPOINTMENT (OUTPATIENT)
Dept: OBGYN | Facility: CLINIC | Age: 45
End: 2025-06-26
Payer: MEDICAID

## 2025-06-26 ENCOUNTER — APPOINTMENT (OUTPATIENT)
Dept: ANTEPARTUM | Facility: CLINIC | Age: 45
End: 2025-06-26
Payer: MEDICAID

## 2025-06-26 ENCOUNTER — ASOB RESULT (OUTPATIENT)
Age: 45
End: 2025-06-26

## 2025-06-26 ENCOUNTER — OUTPATIENT (OUTPATIENT)
Dept: OUTPATIENT SERVICES | Facility: HOSPITAL | Age: 45
LOS: 1 days | End: 2025-06-26
Payer: MEDICAID

## 2025-06-26 VITALS
SYSTOLIC BLOOD PRESSURE: 102 MMHG | OXYGEN SATURATION: 97 % | TEMPERATURE: 97.2 F | HEART RATE: 76 BPM | DIASTOLIC BLOOD PRESSURE: 65 MMHG | RESPIRATION RATE: 16 BRPM

## 2025-06-26 DIAGNOSIS — Z98.890 OTHER SPECIFIED POSTPROCEDURAL STATES: Chronic | ICD-10-CM

## 2025-06-26 DIAGNOSIS — Z34.00 ENCOUNTER FOR SUPERVISION OF NORMAL FIRST PREGNANCY, UNSPECIFIED TRIMESTER: ICD-10-CM

## 2025-06-26 DIAGNOSIS — Z90.49 ACQUIRED ABSENCE OF OTHER SPECIFIED PARTS OF DIGESTIVE TRACT: Chronic | ICD-10-CM

## 2025-06-26 PROCEDURE — 76816 OB US FOLLOW-UP PER FETUS: CPT

## 2025-06-26 PROCEDURE — 99213 OFFICE O/P EST LOW 20 MIN: CPT

## 2025-06-26 PROCEDURE — G0463: CPT

## 2025-06-26 PROCEDURE — 81003 URINALYSIS AUTO W/O SCOPE: CPT

## 2025-06-30 DIAGNOSIS — O09.899 SUPERVISION OF OTHER HIGH RISK PREGNANCIES, UNSPECIFIED TRIMESTER: ICD-10-CM

## 2025-06-30 DIAGNOSIS — O09.529 SUPERVISION OF ELDERLY MULTIGRAVIDA, UNSPECIFIED TRIMESTER: ICD-10-CM

## 2025-06-30 DIAGNOSIS — Z34.90 ENCOUNTER FOR SUPERVISION OF NORMAL PREGNANCY, UNSPECIFIED, UNSPECIFIED TRIMESTER: ICD-10-CM

## 2025-06-30 DIAGNOSIS — O24.419 GESTATIONAL DIABETES MELLITUS IN PREGNANCY, UNSPECIFIED CONTROL: ICD-10-CM

## 2025-07-10 ENCOUNTER — APPOINTMENT (OUTPATIENT)
Dept: ANTEPARTUM | Facility: CLINIC | Age: 45
End: 2025-07-10
Payer: MEDICAID

## 2025-07-10 ENCOUNTER — OUTPATIENT (OUTPATIENT)
Dept: OUTPATIENT SERVICES | Facility: HOSPITAL | Age: 45
LOS: 1 days | End: 2025-07-10
Payer: MEDICAID

## 2025-07-10 ENCOUNTER — ASOB RESULT (OUTPATIENT)
Age: 45
End: 2025-07-10

## 2025-07-10 ENCOUNTER — APPOINTMENT (OUTPATIENT)
Dept: OBGYN | Facility: CLINIC | Age: 45
End: 2025-07-10
Payer: MEDICAID

## 2025-07-10 VITALS
DIASTOLIC BLOOD PRESSURE: 67 MMHG | HEIGHT: 63 IN | HEART RATE: 76 BPM | BODY MASS INDEX: 34.73 KG/M2 | SYSTOLIC BLOOD PRESSURE: 106 MMHG | RESPIRATION RATE: 16 BRPM | OXYGEN SATURATION: 98 % | TEMPERATURE: 97 F | WEIGHT: 196 LBS

## 2025-07-10 DIAGNOSIS — Z98.890 OTHER SPECIFIED POSTPROCEDURAL STATES: Chronic | ICD-10-CM

## 2025-07-10 DIAGNOSIS — Z90.49 ACQUIRED ABSENCE OF OTHER SPECIFIED PARTS OF DIGESTIVE TRACT: Chronic | ICD-10-CM

## 2025-07-10 DIAGNOSIS — Z34.00 ENCOUNTER FOR SUPERVISION OF NORMAL FIRST PREGNANCY, UNSPECIFIED TRIMESTER: ICD-10-CM

## 2025-07-10 PROCEDURE — 87591 N.GONORRHOEAE DNA AMP PROB: CPT

## 2025-07-10 PROCEDURE — 87491 CHLMYD TRACH DNA AMP PROBE: CPT

## 2025-07-10 PROCEDURE — 99213 OFFICE O/P EST LOW 20 MIN: CPT

## 2025-07-10 PROCEDURE — 87389 HIV-1 AG W/HIV-1&-2 AB AG IA: CPT

## 2025-07-10 PROCEDURE — 85025 COMPLETE CBC W/AUTO DIFF WBC: CPT

## 2025-07-10 PROCEDURE — 76818 FETAL BIOPHYS PROFILE W/NST: CPT

## 2025-07-10 PROCEDURE — 86780 TREPONEMA PALLIDUM: CPT

## 2025-07-10 PROCEDURE — 87653 STREP B DNA AMP PROBE: CPT

## 2025-07-10 PROCEDURE — G0463: CPT

## 2025-07-10 PROCEDURE — 81003 URINALYSIS AUTO W/O SCOPE: CPT

## 2025-07-10 RX ORDER — METRONIDAZOLE 500 MG/1
500 TABLET ORAL TWICE DAILY
Qty: 14 | Refills: 0 | Status: ACTIVE | COMMUNITY
Start: 2025-07-10 | End: 1900-01-01

## 2025-07-11 LAB
BASOPHILS # BLD AUTO: 0.02 K/UL
BASOPHILS NFR BLD AUTO: 0.2 %
C TRACH RRNA SPEC QL NAA+PROBE: NOT DETECTED
EOSINOPHIL # BLD AUTO: 0.08 K/UL
EOSINOPHIL NFR BLD AUTO: 0.7 %
HCT VFR BLD CALC: 36 %
HGB BLD-MCNC: 11.5 G/DL
HIV1+2 AB SPEC QL IA.RAPID: NONREACTIVE
IMM GRANULOCYTES NFR BLD AUTO: 0.5 %
LYMPHOCYTES # BLD AUTO: 2.37 K/UL
LYMPHOCYTES NFR BLD AUTO: 22.1 %
MAN DIFF?: NORMAL
MCHC RBC-ENTMCNC: 30 PG
MCHC RBC-ENTMCNC: 31.9 G/DL
MCV RBC AUTO: 94 FL
MONOCYTES # BLD AUTO: 0.64 K/UL
MONOCYTES NFR BLD AUTO: 6 %
N GONORRHOEA RRNA SPEC QL NAA+PROBE: NOT DETECTED
NEUTROPHILS # BLD AUTO: 7.58 K/UL
NEUTROPHILS NFR BLD AUTO: 70.5 %
PLATELET # BLD AUTO: 313 K/UL
RBC # BLD: 3.83 M/UL
RBC # FLD: 15.4 %
SOURCE AMPLIFICATION: NORMAL
T PALLIDUM AB SER QL IA: NEGATIVE
WBC # FLD AUTO: 10.74 K/UL

## 2025-07-14 LAB
GP B STREP DNA SPEC QL NAA+PROBE: NOT DETECTED
SOURCE GBS: NORMAL

## 2025-07-15 DIAGNOSIS — O24.419 GESTATIONAL DIABETES MELLITUS IN PREGNANCY, UNSPECIFIED CONTROL: ICD-10-CM

## 2025-07-15 DIAGNOSIS — O09.299 SUPERVISION OF PREGNANCY WITH OTHER POOR REPRODUCTIVE OR OBSTETRIC HISTORY, UNSPECIFIED TRIMESTER: ICD-10-CM

## 2025-07-15 DIAGNOSIS — O09.529 SUPERVISION OF ELDERLY MULTIGRAVIDA, UNSPECIFIED TRIMESTER: ICD-10-CM

## 2025-07-15 DIAGNOSIS — O09.899 SUPERVISION OF OTHER HIGH RISK PREGNANCIES, UNSPECIFIED TRIMESTER: ICD-10-CM

## 2025-07-17 ENCOUNTER — ASOB RESULT (OUTPATIENT)
Age: 45
End: 2025-07-17

## 2025-07-17 ENCOUNTER — APPOINTMENT (OUTPATIENT)
Dept: ANTEPARTUM | Facility: CLINIC | Age: 45
End: 2025-07-17
Payer: MEDICAID

## 2025-07-17 ENCOUNTER — APPOINTMENT (OUTPATIENT)
Dept: OBGYN | Facility: CLINIC | Age: 45
End: 2025-07-17
Payer: MEDICAID

## 2025-07-17 ENCOUNTER — OUTPATIENT (OUTPATIENT)
Dept: OUTPATIENT SERVICES | Facility: HOSPITAL | Age: 45
LOS: 1 days | End: 2025-07-17
Payer: MEDICAID

## 2025-07-17 VITALS
OXYGEN SATURATION: 100 % | SYSTOLIC BLOOD PRESSURE: 114 MMHG | BODY MASS INDEX: 35.26 KG/M2 | HEIGHT: 63 IN | RESPIRATION RATE: 16 BRPM | HEART RATE: 73 BPM | WEIGHT: 199 LBS | TEMPERATURE: 97.3 F | DIASTOLIC BLOOD PRESSURE: 71 MMHG

## 2025-07-17 DIAGNOSIS — Z90.49 ACQUIRED ABSENCE OF OTHER SPECIFIED PARTS OF DIGESTIVE TRACT: Chronic | ICD-10-CM

## 2025-07-17 DIAGNOSIS — Z34.00 ENCOUNTER FOR SUPERVISION OF NORMAL FIRST PREGNANCY, UNSPECIFIED TRIMESTER: ICD-10-CM

## 2025-07-17 DIAGNOSIS — Z98.890 OTHER SPECIFIED POSTPROCEDURAL STATES: Chronic | ICD-10-CM

## 2025-07-17 PROCEDURE — 99213 OFFICE O/P EST LOW 20 MIN: CPT

## 2025-07-17 PROCEDURE — 76818 FETAL BIOPHYS PROFILE W/NST: CPT

## 2025-07-17 PROCEDURE — G0463: CPT

## 2025-07-17 PROCEDURE — 81003 URINALYSIS AUTO W/O SCOPE: CPT

## 2025-07-21 DIAGNOSIS — O24.410 GESTATIONAL DIABETES MELLITUS IN PREGNANCY, DIET CONTROLLED: ICD-10-CM

## 2025-07-21 DIAGNOSIS — O09.899 SUPERVISION OF OTHER HIGH RISK PREGNANCIES, UNSPECIFIED TRIMESTER: ICD-10-CM

## 2025-07-21 DIAGNOSIS — O09.529 SUPERVISION OF ELDERLY MULTIGRAVIDA, UNSPECIFIED TRIMESTER: ICD-10-CM

## 2025-07-24 ENCOUNTER — OUTPATIENT (OUTPATIENT)
Dept: OUTPATIENT SERVICES | Facility: HOSPITAL | Age: 45
LOS: 1 days | End: 2025-07-24
Payer: MEDICAID

## 2025-07-24 ENCOUNTER — ASOB RESULT (OUTPATIENT)
Age: 45
End: 2025-07-24

## 2025-07-24 ENCOUNTER — APPOINTMENT (OUTPATIENT)
Dept: ANTEPARTUM | Facility: CLINIC | Age: 45
End: 2025-07-24
Payer: MEDICAID

## 2025-07-24 ENCOUNTER — APPOINTMENT (OUTPATIENT)
Dept: OBGYN | Facility: CLINIC | Age: 45
End: 2025-07-24
Payer: MEDICAID

## 2025-07-24 VITALS
OXYGEN SATURATION: 100 % | RESPIRATION RATE: 18 BRPM | HEART RATE: 66 BPM | BODY MASS INDEX: 35.61 KG/M2 | TEMPERATURE: 97.9 F | DIASTOLIC BLOOD PRESSURE: 62 MMHG | HEIGHT: 63 IN | WEIGHT: 201 LBS | SYSTOLIC BLOOD PRESSURE: 106 MMHG

## 2025-07-24 DIAGNOSIS — Z98.890 OTHER SPECIFIED POSTPROCEDURAL STATES: Chronic | ICD-10-CM

## 2025-07-24 DIAGNOSIS — Z90.49 ACQUIRED ABSENCE OF OTHER SPECIFIED PARTS OF DIGESTIVE TRACT: Chronic | ICD-10-CM

## 2025-07-24 DIAGNOSIS — Z34.00 ENCOUNTER FOR SUPERVISION OF NORMAL FIRST PREGNANCY, UNSPECIFIED TRIMESTER: ICD-10-CM

## 2025-07-24 PROCEDURE — 81003 URINALYSIS AUTO W/O SCOPE: CPT

## 2025-07-24 PROCEDURE — 76816 OB US FOLLOW-UP PER FETUS: CPT

## 2025-07-24 PROCEDURE — 99213 OFFICE O/P EST LOW 20 MIN: CPT

## 2025-07-24 PROCEDURE — 76818 FETAL BIOPHYS PROFILE W/NST: CPT | Mod: 59

## 2025-07-24 PROCEDURE — G0463: CPT

## 2025-07-24 PROCEDURE — ZZZZZ: CPT

## 2025-07-28 DIAGNOSIS — Z34.93 ENCOUNTER FOR SUPERVISION OF NORMAL PREGNANCY, UNSPECIFIED, THIRD TRIMESTER: ICD-10-CM

## 2025-07-30 ENCOUNTER — INPATIENT (INPATIENT)
Facility: HOSPITAL | Age: 45
LOS: 1 days | Discharge: HOME | End: 2025-08-01
Attending: SPECIALIST | Admitting: SPECIALIST
Payer: MEDICAID

## 2025-07-30 ENCOUNTER — OUTPATIENT (OUTPATIENT)
Dept: OUTPATIENT SERVICES | Facility: HOSPITAL | Age: 45
LOS: 1 days | End: 2025-07-30
Payer: MEDICAID

## 2025-07-30 ENCOUNTER — APPOINTMENT (OUTPATIENT)
Dept: OBGYN | Facility: CLINIC | Age: 45
End: 2025-07-30
Payer: MEDICAID

## 2025-07-30 ENCOUNTER — APPOINTMENT (OUTPATIENT)
Dept: ANTEPARTUM | Facility: CLINIC | Age: 45
End: 2025-07-30
Payer: MEDICAID

## 2025-07-30 ENCOUNTER — APPOINTMENT (OUTPATIENT)
Dept: ANTEPARTUM | Facility: CLINIC | Age: 45
End: 2025-07-30

## 2025-07-30 ENCOUNTER — ASOB RESULT (OUTPATIENT)
Age: 45
End: 2025-07-30

## 2025-07-30 VITALS
OXYGEN SATURATION: 100 % | BODY MASS INDEX: 36.14 KG/M2 | TEMPERATURE: 98 F | RESPIRATION RATE: 18 BRPM | SYSTOLIC BLOOD PRESSURE: 109 MMHG | WEIGHT: 204 LBS | DIASTOLIC BLOOD PRESSURE: 70 MMHG | HEART RATE: 80 BPM | HEIGHT: 63 IN

## 2025-07-30 VITALS
OXYGEN SATURATION: 97 % | HEART RATE: 97 BPM | TEMPERATURE: 98 F | RESPIRATION RATE: 16 BRPM | SYSTOLIC BLOOD PRESSURE: 125 MMHG | DIASTOLIC BLOOD PRESSURE: 86 MMHG

## 2025-07-30 DIAGNOSIS — Z98.890 OTHER SPECIFIED POSTPROCEDURAL STATES: Chronic | ICD-10-CM

## 2025-07-30 DIAGNOSIS — Z90.49 ACQUIRED ABSENCE OF OTHER SPECIFIED PARTS OF DIGESTIVE TRACT: Chronic | ICD-10-CM

## 2025-07-30 DIAGNOSIS — Z34.90 ENCOUNTER FOR SUPERVISION OF NORMAL PREGNANCY, UNSPECIFIED, UNSPECIFIED TRIMESTER: ICD-10-CM

## 2025-07-30 DIAGNOSIS — Z34.00 ENCOUNTER FOR SUPERVISION OF NORMAL FIRST PREGNANCY, UNSPECIFIED TRIMESTER: ICD-10-CM

## 2025-07-30 DIAGNOSIS — O09.899 SUPERVISION OF OTHER HIGH RISK PREGNANCIES, UNSPECIFIED TRIMESTER: ICD-10-CM

## 2025-07-30 DIAGNOSIS — O26.899 OTHER SPECIFIED PREGNANCY RELATED CONDITIONS, UNSPECIFIED TRIMESTER: ICD-10-CM

## 2025-07-30 DIAGNOSIS — O24.419 GESTATIONAL DIABETES MELLITUS IN PREGNANCY, UNSPECIFIED CONTROL: ICD-10-CM

## 2025-07-30 DIAGNOSIS — O24.410 GESTATIONAL DIABETES MELLITUS IN PREGNANCY, DIET CONTROLLED: ICD-10-CM

## 2025-07-30 DIAGNOSIS — O09.529 SUPERVISION OF ELDERLY MULTIGRAVIDA, UNSPECIFIED TRIMESTER: ICD-10-CM

## 2025-07-30 LAB
BASOPHILS # BLD AUTO: 0.01 K/UL — SIGNIFICANT CHANGE UP (ref 0–0.2)
BASOPHILS NFR BLD AUTO: 0.1 % — SIGNIFICANT CHANGE UP (ref 0–2)
BLD GP AB SCN SERPL QL: NEGATIVE — SIGNIFICANT CHANGE UP
EOSINOPHIL # BLD AUTO: 0.09 K/UL — SIGNIFICANT CHANGE UP (ref 0–0.5)
EOSINOPHIL NFR BLD AUTO: 0.8 % — SIGNIFICANT CHANGE UP (ref 0–6)
HCT VFR BLD CALC: 36.5 % — SIGNIFICANT CHANGE UP (ref 34.5–45)
HGB BLD-MCNC: 11.9 G/DL — SIGNIFICANT CHANGE UP (ref 11.5–15.5)
IMM GRANULOCYTES # BLD AUTO: 0.09 K/UL — HIGH (ref 0–0.07)
IMM GRANULOCYTES NFR BLD AUTO: 0.8 % — SIGNIFICANT CHANGE UP (ref 0–0.9)
LYMPHOCYTES # BLD AUTO: 2.81 K/UL — SIGNIFICANT CHANGE UP (ref 1–3.3)
LYMPHOCYTES NFR BLD AUTO: 23.7 % — SIGNIFICANT CHANGE UP (ref 13–44)
MCHC RBC-ENTMCNC: 30.1 PG — SIGNIFICANT CHANGE UP (ref 27–34)
MCHC RBC-ENTMCNC: 32.6 G/DL — SIGNIFICANT CHANGE UP (ref 32–36)
MCV RBC AUTO: 92.4 FL — SIGNIFICANT CHANGE UP (ref 80–100)
MONOCYTES # BLD AUTO: 0.56 K/UL — SIGNIFICANT CHANGE UP (ref 0–0.9)
MONOCYTES NFR BLD AUTO: 4.7 % — SIGNIFICANT CHANGE UP (ref 2–14)
NEUTROPHILS # BLD AUTO: 8.3 K/UL — HIGH (ref 1.8–7.4)
NEUTROPHILS NFR BLD AUTO: 69.9 % — SIGNIFICANT CHANGE UP (ref 43–77)
NRBC # BLD AUTO: 0 K/UL — SIGNIFICANT CHANGE UP (ref 0–0)
NRBC # FLD: 0 K/UL — SIGNIFICANT CHANGE UP (ref 0–0)
NRBC BLD AUTO-RTO: 0 /100 WBCS — SIGNIFICANT CHANGE UP (ref 0–0)
PLATELET # BLD AUTO: 286 K/UL — SIGNIFICANT CHANGE UP (ref 150–400)
PMV BLD: 10.8 FL — SIGNIFICANT CHANGE UP (ref 7–13)
RBC # BLD: 3.95 M/UL — SIGNIFICANT CHANGE UP (ref 3.8–5.2)
RBC # FLD: 15.2 % — HIGH (ref 10.3–14.5)
RH IG SCN BLD-IMP: POSITIVE — SIGNIFICANT CHANGE UP
WBC # BLD: 11.86 K/UL — HIGH (ref 3.8–10.5)
WBC # FLD AUTO: 11.86 K/UL — HIGH (ref 3.8–10.5)

## 2025-07-30 PROCEDURE — 81003 URINALYSIS AUTO W/O SCOPE: CPT

## 2025-07-30 PROCEDURE — 76818 FETAL BIOPHYS PROFILE W/NST: CPT

## 2025-07-30 PROCEDURE — 59409 OBSTETRICAL CARE: CPT | Mod: U9,GC

## 2025-07-30 PROCEDURE — G0463: CPT

## 2025-07-30 PROCEDURE — 99213 OFFICE O/P EST LOW 20 MIN: CPT

## 2025-07-30 RX ORDER — SODIUM CHLORIDE 9 G/1000ML
1000 INJECTION, SOLUTION INTRAVENOUS
Refills: 0 | Status: DISCONTINUED | OUTPATIENT
Start: 2025-07-30 | End: 2025-07-30

## 2025-07-30 RX ORDER — DIPHENHYDRAMINE HCL 12.5MG/5ML
25 ELIXIR ORAL EVERY 6 HOURS
Refills: 0 | Status: DISCONTINUED | OUTPATIENT
Start: 2025-07-30 | End: 2025-08-01

## 2025-07-30 RX ORDER — HYDROCORTISONE 10 MG/G
1 CREAM TOPICAL EVERY 6 HOURS
Refills: 0 | Status: DISCONTINUED | OUTPATIENT
Start: 2025-07-30 | End: 2025-08-01

## 2025-07-30 RX ORDER — OXYTOCIN-SODIUM CHLORIDE 0.9% IV SOLN 30 UNIT/500ML 30-0.9/5 UT/ML-%
167 SOLUTION INTRAVENOUS
Qty: 30 | Refills: 0 | Status: DISCONTINUED | OUTPATIENT
Start: 2025-07-30 | End: 2025-07-30

## 2025-07-30 RX ORDER — OXYCODONE HYDROCHLORIDE 30 MG/1
5 TABLET ORAL
Refills: 0 | Status: DISCONTINUED | OUTPATIENT
Start: 2025-07-30 | End: 2025-08-01

## 2025-07-30 RX ORDER — KETOROLAC TROMETHAMINE 30 MG/ML
30 INJECTION, SOLUTION INTRAMUSCULAR; INTRAVENOUS ONCE
Refills: 0 | Status: DISCONTINUED | OUTPATIENT
Start: 2025-07-30 | End: 2025-07-31

## 2025-07-30 RX ORDER — ACETAMINOPHEN 500 MG/5ML
975 LIQUID (ML) ORAL
Refills: 0 | Status: DISCONTINUED | OUTPATIENT
Start: 2025-07-30 | End: 2025-08-01

## 2025-07-30 RX ORDER — SODIUM CHLORIDE 9 G/1000ML
1000 INJECTION, SOLUTION INTRAVENOUS ONCE
Refills: 0 | Status: DISCONTINUED | OUTPATIENT
Start: 2025-07-30 | End: 2025-07-30

## 2025-07-30 RX ORDER — PRENATAL 136/IRON/FOLIC ACID 27 MG-1 MG
1 TABLET ORAL DAILY
Refills: 0 | Status: DISCONTINUED | OUTPATIENT
Start: 2025-07-30 | End: 2025-08-01

## 2025-07-30 RX ORDER — CITRIC ACID/SODIUM CITRATE 300-500 MG
15 SOLUTION, ORAL ORAL EVERY 6 HOURS
Refills: 0 | Status: DISCONTINUED | OUTPATIENT
Start: 2025-07-30 | End: 2025-07-30

## 2025-07-30 RX ORDER — IBUPROFEN 200 MG
600 TABLET ORAL EVERY 6 HOURS
Refills: 0 | Status: COMPLETED | OUTPATIENT
Start: 2025-07-30 | End: 2026-06-28

## 2025-07-30 RX ORDER — OXYTOCIN-SODIUM CHLORIDE 0.9% IV SOLN 30 UNIT/500ML 30-0.9/5 UT/ML-%
SOLUTION INTRAVENOUS
Qty: 30 | Refills: 0 | Status: DISCONTINUED | OUTPATIENT
Start: 2025-07-30 | End: 2025-07-30

## 2025-07-30 RX ORDER — SIMETHICONE 80 MG
80 TABLET,CHEWABLE ORAL EVERY 4 HOURS
Refills: 0 | Status: DISCONTINUED | OUTPATIENT
Start: 2025-07-30 | End: 2025-08-01

## 2025-07-30 RX ORDER — MAGNESIUM HYDROXIDE 400 MG/5ML
30 SUSPENSION ORAL
Refills: 0 | Status: DISCONTINUED | OUTPATIENT
Start: 2025-07-30 | End: 2025-08-01

## 2025-07-30 RX ORDER — SODIUM CHLORIDE 9 G/1000ML
500 INJECTION, SOLUTION INTRAVENOUS ONCE
Refills: 0 | Status: DISCONTINUED | OUTPATIENT
Start: 2025-07-30 | End: 2025-07-30

## 2025-07-30 RX ORDER — WITCH HAZEL LEAF
1 FLUID EXTRACT MISCELLANEOUS EVERY 4 HOURS
Refills: 0 | Status: DISCONTINUED | OUTPATIENT
Start: 2025-07-30 | End: 2025-08-01

## 2025-07-30 RX ORDER — BENZOCAINE 220 MG/G
1 SPRAY, METERED PERIODONTAL EVERY 6 HOURS
Refills: 0 | Status: DISCONTINUED | OUTPATIENT
Start: 2025-07-30 | End: 2025-08-01

## 2025-07-30 RX ORDER — OXYCODONE HYDROCHLORIDE 30 MG/1
5 TABLET ORAL ONCE
Refills: 0 | Status: DISCONTINUED | OUTPATIENT
Start: 2025-07-30 | End: 2025-08-01

## 2025-07-30 RX ORDER — OXYTOCIN-SODIUM CHLORIDE 0.9% IV SOLN 30 UNIT/500ML 30-0.9/5 UT/ML-%
167 SOLUTION INTRAVENOUS
Qty: 30 | Refills: 0 | Status: DISCONTINUED | OUTPATIENT
Start: 2025-07-30 | End: 2025-08-01

## 2025-07-30 RX ORDER — PRAMOXINE HCL 1 %
1 GEL (GRAM) TOPICAL EVERY 4 HOURS
Refills: 0 | Status: DISCONTINUED | OUTPATIENT
Start: 2025-07-30 | End: 2025-08-01

## 2025-07-30 RX ORDER — MODIFIED LANOLIN 100 %
1 CREAM (GRAM) TOPICAL EVERY 6 HOURS
Refills: 0 | Status: DISCONTINUED | OUTPATIENT
Start: 2025-07-30 | End: 2025-08-01

## 2025-07-30 RX ORDER — DIBUCAINE 10 MG/G
1 OINTMENT TOPICAL EVERY 6 HOURS
Refills: 0 | Status: DISCONTINUED | OUTPATIENT
Start: 2025-07-30 | End: 2025-08-01

## 2025-07-30 RX ADMIN — SODIUM CHLORIDE 125 MILLILITER(S): 9 INJECTION, SOLUTION INTRAVENOUS at 20:10

## 2025-07-30 RX ADMIN — OXYTOCIN-SODIUM CHLORIDE 0.9% IV SOLN 30 UNIT/500ML 167 MILLIUNIT(S)/MIN: 30-0.9/5 SOLUTION at 23:45

## 2025-07-30 RX ADMIN — OXYTOCIN-SODIUM CHLORIDE 0.9% IV SOLN 30 UNIT/500ML 2 MILLIUNIT(S)/MIN: 30-0.9/5 SOLUTION at 23:28

## 2025-07-31 LAB
ALBUMIN SERPL ELPH-MCNC: 3.1 G/DL — LOW (ref 3.3–5)
ALP SERPL-CCNC: 143 U/L — HIGH (ref 40–120)
ALT FLD-CCNC: 12 U/L — SIGNIFICANT CHANGE UP (ref 4–33)
ANION GAP SERPL CALC-SCNC: 13 MMOL/L — SIGNIFICANT CHANGE UP (ref 7–14)
APTT BLD: 23.2 SEC — LOW (ref 26.1–36.8)
AST SERPL-CCNC: 17 U/L — SIGNIFICANT CHANGE UP (ref 4–32)
BASOPHILS # BLD AUTO: 0.02 K/UL — SIGNIFICANT CHANGE UP (ref 0–0.2)
BASOPHILS NFR BLD AUTO: 0.1 % — SIGNIFICANT CHANGE UP (ref 0–2)
BILIRUB SERPL-MCNC: 0.3 MG/DL — SIGNIFICANT CHANGE UP (ref 0.2–1.2)
BUN SERPL-MCNC: 11 MG/DL — SIGNIFICANT CHANGE UP (ref 7–23)
CALCIUM SERPL-MCNC: 8.9 MG/DL — SIGNIFICANT CHANGE UP (ref 8.4–10.5)
CHLORIDE SERPL-SCNC: 101 MMOL/L — SIGNIFICANT CHANGE UP (ref 98–107)
CO2 SERPL-SCNC: 19 MMOL/L — LOW (ref 22–31)
CREAT SERPL-MCNC: 0.49 MG/DL — LOW (ref 0.5–1.3)
EGFR: 120 ML/MIN/1.73M2 — SIGNIFICANT CHANGE UP
EGFR: 120 ML/MIN/1.73M2 — SIGNIFICANT CHANGE UP
EOSINOPHIL # BLD AUTO: 0.02 K/UL — SIGNIFICANT CHANGE UP (ref 0–0.5)
EOSINOPHIL NFR BLD AUTO: 0.1 % — SIGNIFICANT CHANGE UP (ref 0–6)
FIBRINOGEN PPP-MCNC: 263 MG/DL — SIGNIFICANT CHANGE UP (ref 200–465)
GLUCOSE SERPL-MCNC: 117 MG/DL — HIGH (ref 70–99)
HCT VFR BLD CALC: 31.9 % — LOW (ref 34.5–45)
HGB BLD-MCNC: 10.7 G/DL — LOW (ref 11.5–15.5)
IMM GRANULOCYTES # BLD AUTO: 0.1 K/UL — HIGH (ref 0–0.07)
IMM GRANULOCYTES NFR BLD AUTO: 0.6 % — SIGNIFICANT CHANGE UP (ref 0–0.9)
INR BLD: 0.9 RATIO — SIGNIFICANT CHANGE UP (ref 0.85–1.16)
LDH SERPL L TO P-CCNC: 145 U/L — SIGNIFICANT CHANGE UP (ref 135–225)
LYMPHOCYTES # BLD AUTO: 2.73 K/UL — SIGNIFICANT CHANGE UP (ref 1–3.3)
LYMPHOCYTES NFR BLD AUTO: 15.1 % — SIGNIFICANT CHANGE UP (ref 13–44)
MCHC RBC-ENTMCNC: 30.5 PG — SIGNIFICANT CHANGE UP (ref 27–34)
MCHC RBC-ENTMCNC: 33.5 G/DL — SIGNIFICANT CHANGE UP (ref 32–36)
MCV RBC AUTO: 90.9 FL — SIGNIFICANT CHANGE UP (ref 80–100)
MONOCYTES # BLD AUTO: 0.77 K/UL — SIGNIFICANT CHANGE UP (ref 0–0.9)
MONOCYTES NFR BLD AUTO: 4.3 % — SIGNIFICANT CHANGE UP (ref 2–14)
NEUTROPHILS # BLD AUTO: 14.45 K/UL — HIGH (ref 1.8–7.4)
NEUTROPHILS NFR BLD AUTO: 79.8 % — HIGH (ref 43–77)
NRBC # BLD AUTO: 0 K/UL — SIGNIFICANT CHANGE UP (ref 0–0)
NRBC # FLD: 0 K/UL — SIGNIFICANT CHANGE UP (ref 0–0)
NRBC BLD AUTO-RTO: 0 /100 WBCS — SIGNIFICANT CHANGE UP (ref 0–0)
PLATELET # BLD AUTO: 289 K/UL — SIGNIFICANT CHANGE UP (ref 150–400)
PMV BLD: 10.7 FL — SIGNIFICANT CHANGE UP (ref 7–13)
POTASSIUM SERPL-MCNC: 4.5 MMOL/L — SIGNIFICANT CHANGE UP (ref 3.5–5.3)
POTASSIUM SERPL-SCNC: 4.5 MMOL/L — SIGNIFICANT CHANGE UP (ref 3.5–5.3)
PROT SERPL-MCNC: 6.4 G/DL — SIGNIFICANT CHANGE UP (ref 6–8.3)
PROTHROM AB SERPL-ACNC: 10.5 SEC — SIGNIFICANT CHANGE UP (ref 9.9–13.4)
RBC # BLD: 3.51 M/UL — LOW (ref 3.8–5.2)
RBC # FLD: 15.1 % — HIGH (ref 10.3–14.5)
SODIUM SERPL-SCNC: 133 MMOL/L — LOW (ref 135–145)
T PALLIDUM AB TITR SER: NEGATIVE — SIGNIFICANT CHANGE UP
URATE SERPL-MCNC: 5.4 MG/DL — SIGNIFICANT CHANGE UP (ref 2.5–7)
WBC # BLD: 18.09 K/UL — HIGH (ref 3.8–10.5)
WBC # FLD AUTO: 18.09 K/UL — HIGH (ref 3.8–10.5)

## 2025-07-31 RX ORDER — IBUPROFEN 200 MG
600 TABLET ORAL EVERY 6 HOURS
Refills: 0 | Status: DISCONTINUED | OUTPATIENT
Start: 2025-07-31 | End: 2025-08-01

## 2025-07-31 RX ORDER — CEFAZOLIN SODIUM IN 0.9 % NACL 3 G/100 ML
2000 INTRAVENOUS SOLUTION, PIGGYBACK (ML) INTRAVENOUS ONCE
Refills: 0 | Status: COMPLETED | OUTPATIENT
Start: 2025-07-31 | End: 2025-07-31

## 2025-07-31 RX ORDER — TRANEXAMIC ACID 1000 MG/10
1000 AMPUL (ML) INTRAVENOUS ONCE
Refills: 0 | Status: COMPLETED | OUTPATIENT
Start: 2025-07-31 | End: 2025-07-31

## 2025-07-31 RX ORDER — ACETAMINOPHEN 500 MG/5ML
1000 LIQUID (ML) ORAL ONCE
Refills: 0 | Status: COMPLETED | OUTPATIENT
Start: 2025-07-31 | End: 2025-07-31

## 2025-07-31 RX ORDER — SODIUM CHLORIDE 9 G/1000ML
1000 INJECTION, SOLUTION INTRAVENOUS ONCE
Refills: 0 | Status: COMPLETED | OUTPATIENT
Start: 2025-07-31 | End: 2025-07-31

## 2025-07-31 RX ADMIN — Medication 0.2 MILLIGRAM(S): at 22:16

## 2025-07-31 RX ADMIN — Medication 3 MILLILITER(S): at 22:16

## 2025-07-31 RX ADMIN — KETOROLAC TROMETHAMINE 30 MILLIGRAM(S): 30 INJECTION, SOLUTION INTRAMUSCULAR; INTRAVENOUS at 01:26

## 2025-07-31 RX ADMIN — Medication 600 MILLIGRAM(S): at 14:35

## 2025-07-31 RX ADMIN — Medication 3 MILLILITER(S): at 14:35

## 2025-07-31 RX ADMIN — Medication 0.2 MILLIGRAM(S): at 09:58

## 2025-07-31 RX ADMIN — Medication 2 MILLIGRAM(S): at 03:33

## 2025-07-31 RX ADMIN — Medication 600 MILLIGRAM(S): at 20:55

## 2025-07-31 RX ADMIN — OXYCODONE HYDROCHLORIDE 5 MILLIGRAM(S): 30 TABLET ORAL at 23:38

## 2025-07-31 RX ADMIN — Medication 400 MILLIGRAM(S): at 02:31

## 2025-07-31 RX ADMIN — Medication 0.2 MILLIGRAM(S): at 13:47

## 2025-07-31 RX ADMIN — Medication 0.2 MILLIGRAM(S): at 02:26

## 2025-07-31 RX ADMIN — Medication 1000 MILLIGRAM(S): at 03:33

## 2025-07-31 RX ADMIN — Medication 600 MILLIGRAM(S): at 20:25

## 2025-07-31 RX ADMIN — Medication 975 MILLIGRAM(S): at 17:42

## 2025-07-31 RX ADMIN — Medication 220 MILLIGRAM(S): at 02:36

## 2025-07-31 RX ADMIN — Medication 100 MILLIGRAM(S): at 02:38

## 2025-07-31 RX ADMIN — KETOROLAC TROMETHAMINE 30 MILLIGRAM(S): 30 INJECTION, SOLUTION INTRAMUSCULAR; INTRAVENOUS at 02:30

## 2025-07-31 RX ADMIN — Medication 2 MILLIGRAM(S): at 02:29

## 2025-07-31 RX ADMIN — Medication 0.2 MILLIGRAM(S): at 06:28

## 2025-07-31 RX ADMIN — Medication 600 MILLIGRAM(S): at 13:47

## 2025-07-31 RX ADMIN — Medication 0.2 MILLIGRAM(S): at 17:41

## 2025-07-31 RX ADMIN — Medication 975 MILLIGRAM(S): at 18:30

## 2025-07-31 RX ADMIN — Medication 975 MILLIGRAM(S): at 09:58

## 2025-07-31 RX ADMIN — SODIUM CHLORIDE 1000 MILLILITER(S): 9 INJECTION, SOLUTION INTRAVENOUS at 02:18

## 2025-07-31 RX ADMIN — Medication 975 MILLIGRAM(S): at 23:38

## 2025-07-31 RX ADMIN — Medication 975 MILLIGRAM(S): at 10:46

## 2025-07-31 RX ADMIN — Medication 1 TABLET(S): at 13:47

## 2025-08-01 VITALS
HEART RATE: 97 BPM | TEMPERATURE: 98 F | RESPIRATION RATE: 18 BRPM | SYSTOLIC BLOOD PRESSURE: 105 MMHG | OXYGEN SATURATION: 100 % | DIASTOLIC BLOOD PRESSURE: 62 MMHG

## 2025-08-01 DIAGNOSIS — O24.419 GESTATIONAL DIABETES MELLITUS IN PREGNANCY, UNSPECIFIED CONTROL: ICD-10-CM

## 2025-08-01 DIAGNOSIS — O09.529 SUPERVISION OF ELDERLY MULTIGRAVIDA, UNSPECIFIED TRIMESTER: ICD-10-CM

## 2025-08-01 DIAGNOSIS — O09.899 SUPERVISION OF OTHER HIGH RISK PREGNANCIES, UNSPECIFIED TRIMESTER: ICD-10-CM

## 2025-08-01 LAB
BASOPHILS # BLD AUTO: 0.01 K/UL — SIGNIFICANT CHANGE UP (ref 0–0.2)
BASOPHILS NFR BLD AUTO: 0.1 % — SIGNIFICANT CHANGE UP (ref 0–2)
EOSINOPHIL # BLD AUTO: 0.11 K/UL — SIGNIFICANT CHANGE UP (ref 0–0.5)
EOSINOPHIL NFR BLD AUTO: 0.9 % — SIGNIFICANT CHANGE UP (ref 0–6)
HCT VFR BLD CALC: 28.8 % — LOW (ref 34.5–45)
HGB BLD-MCNC: 9.6 G/DL — LOW (ref 11.5–15.5)
IMM GRANULOCYTES # BLD AUTO: 0.06 K/UL — SIGNIFICANT CHANGE UP (ref 0–0.07)
IMM GRANULOCYTES NFR BLD AUTO: 0.5 % — SIGNIFICANT CHANGE UP (ref 0–0.9)
LYMPHOCYTES # BLD AUTO: 3.12 K/UL — SIGNIFICANT CHANGE UP (ref 1–3.3)
LYMPHOCYTES NFR BLD AUTO: 26.2 % — SIGNIFICANT CHANGE UP (ref 13–44)
MCHC RBC-ENTMCNC: 30.7 PG — SIGNIFICANT CHANGE UP (ref 27–34)
MCHC RBC-ENTMCNC: 33.3 G/DL — SIGNIFICANT CHANGE UP (ref 32–36)
MCV RBC AUTO: 92 FL — SIGNIFICANT CHANGE UP (ref 80–100)
MONOCYTES # BLD AUTO: 0.73 K/UL — SIGNIFICANT CHANGE UP (ref 0–0.9)
MONOCYTES NFR BLD AUTO: 6.1 % — SIGNIFICANT CHANGE UP (ref 2–14)
NEUTROPHILS # BLD AUTO: 7.86 K/UL — HIGH (ref 1.8–7.4)
NEUTROPHILS NFR BLD AUTO: 66.2 % — SIGNIFICANT CHANGE UP (ref 43–77)
NRBC # BLD AUTO: 0 K/UL — SIGNIFICANT CHANGE UP (ref 0–0)
NRBC # FLD: 0 K/UL — SIGNIFICANT CHANGE UP (ref 0–0)
NRBC BLD AUTO-RTO: 0 /100 WBCS — SIGNIFICANT CHANGE UP (ref 0–0)
PLATELET # BLD AUTO: 228 K/UL — SIGNIFICANT CHANGE UP (ref 150–400)
PMV BLD: 10.5 FL — SIGNIFICANT CHANGE UP (ref 7–13)
RBC # BLD: 3.13 M/UL — LOW (ref 3.8–5.2)
RBC # FLD: 15.3 % — HIGH (ref 10.3–14.5)
WBC # BLD: 11.89 K/UL — HIGH (ref 3.8–10.5)
WBC # FLD AUTO: 11.89 K/UL — HIGH (ref 3.8–10.5)

## 2025-08-01 RX ORDER — MEDROXYPROGESTERONE ACETATE 10 MG
150 TABLET ORAL ONCE
Refills: 0 | Status: COMPLETED | OUTPATIENT
Start: 2025-08-01 | End: 2025-08-01

## 2025-08-01 RX ORDER — ACETAMINOPHEN 500 MG/5ML
3 LIQUID (ML) ORAL
Qty: 0 | Refills: 0 | DISCHARGE
Start: 2025-08-01

## 2025-08-01 RX ORDER — IBUPROFEN 200 MG
1 TABLET ORAL
Qty: 0 | Refills: 0 | DISCHARGE
Start: 2025-08-01

## 2025-08-01 RX ADMIN — Medication 600 MILLIGRAM(S): at 12:45

## 2025-08-01 RX ADMIN — Medication 975 MILLIGRAM(S): at 00:08

## 2025-08-01 RX ADMIN — Medication 975 MILLIGRAM(S): at 06:20

## 2025-08-01 RX ADMIN — Medication 600 MILLIGRAM(S): at 12:18

## 2025-08-01 RX ADMIN — Medication 3 MILLILITER(S): at 06:20

## 2025-08-01 RX ADMIN — Medication 600 MILLIGRAM(S): at 03:14

## 2025-08-01 RX ADMIN — Medication 150 MILLIGRAM(S): at 12:16

## 2025-08-01 RX ADMIN — Medication 1 TABLET(S): at 12:17

## 2025-08-01 RX ADMIN — Medication 600 MILLIGRAM(S): at 02:44

## 2025-08-01 RX ADMIN — OXYCODONE HYDROCHLORIDE 5 MILLIGRAM(S): 30 TABLET ORAL at 00:08

## (undated) DEVICE — GLV 7.5 PROTEXIS (WHITE)

## (undated) DEVICE — SOL IRR POUR NS 0.9% 1000ML

## (undated) DEVICE — ELCTR BOVIE TIP BLADE INSULATED 2.75" EDGE

## (undated) DEVICE — BASIN SET SINGLE

## (undated) DEVICE — PROTECTOR HEEL / ELBOW FLUFFY

## (undated) DEVICE — TUBING INSUFFLATION LAP FILTER 10FT

## (undated) DEVICE — GLV 8 PROTEXIS (WHITE)

## (undated) DEVICE — SUT MONOCRYL 4-0 27" PS-2 UNDYED

## (undated) DEVICE — TIP METZENBAUM SCISSOR MONOPOLAR ENDOCUT (ORANGE)

## (undated) DEVICE — TUBING HYDRO-SURG PLUS IRRIGATOR W SMOKEVAC & PROBE

## (undated) DEVICE — VENODYNE/SCD SLEEVE FOOT

## (undated) DEVICE — ELCTR GROUNDING PAD ADULT COVIDIEN

## (undated) DEVICE — TROCAR COVIDIEN VERSAPORT BLADELESS OPTICAL 5MM STANDARD

## (undated) DEVICE — TUBING OLYMPUS INSUFFLATION

## (undated) DEVICE — SUT VICRYL 0 27" UR-6

## (undated) DEVICE — CANISTER DISPOSABLE THIN WALL 3000CC

## (undated) DEVICE — SOL IRR POUR H2O 500ML

## (undated) DEVICE — CATH IV SAFE INSYTE 14G X 1.75" (ORANGE)

## (undated) DEVICE — VENODYNE/SCD SLEEVE CALF MEDIUM

## (undated) DEVICE — POSITIONER STRAP ARMBOARD VELCRO TS-30

## (undated) DEVICE — INSUFFLATION NDL COVIDIEN SURGINEEDLE VERESS 120MM

## (undated) DEVICE — TROCAR COVIDIEN VERSAONE FIXATION CANNULA 5MM

## (undated) DEVICE — TROCAR COVIDIEN VERSAONE BLADELESS FIXATION 11MM STANDARD

## (undated) DEVICE — D HELP - CLEARVIEW CLEARIFY SYSTEM

## (undated) DEVICE — BLADE SURGICAL #15 CARBON

## (undated) DEVICE — TROCAR COVIDIEN VERSAONE BLADELESS FIXATION 5MM STANDARD

## (undated) DEVICE — PACK GENERAL LAPAROSCOPY

## (undated) DEVICE — DRAPE 3/4 SHEET 52X76"